# Patient Record
Sex: MALE | Race: WHITE | Employment: FULL TIME
[De-identification: names, ages, dates, MRNs, and addresses within clinical notes are randomized per-mention and may not be internally consistent; named-entity substitution may affect disease eponyms.]

---

## 2017-03-06 RX ORDER — SIMVASTATIN 40 MG
40 TABLET ORAL NIGHTLY
Qty: 90 TABLET | Refills: 0 | Status: SHIPPED | OUTPATIENT
Start: 2017-03-06 | End: 2017-04-24 | Stop reason: SDUPTHER

## 2017-03-06 RX ORDER — SPIRONOLACTONE 50 MG/1
50 TABLET, FILM COATED ORAL 2 TIMES DAILY
Qty: 60 TABLET | Refills: 3 | Status: SHIPPED | OUTPATIENT
Start: 2017-03-06 | End: 2017-04-27 | Stop reason: SDUPTHER

## 2017-03-09 ENCOUNTER — TELEPHONE (OUTPATIENT)
Dept: FAMILY MEDICINE CLINIC | Facility: CLINIC | Age: 47
End: 2017-03-09

## 2017-03-14 ENCOUNTER — HOSPITAL ENCOUNTER (OUTPATIENT)
Dept: CT IMAGING | Age: 47
Discharge: HOME | End: 2017-03-14
Payer: COMMERCIAL

## 2017-03-14 ENCOUNTER — HOSPITAL ENCOUNTER (OUTPATIENT)
Age: 47
Setting detail: SPECIMEN
Discharge: HOME | End: 2017-03-14
Payer: COMMERCIAL

## 2017-03-14 DIAGNOSIS — I71.20 THORACIC AORTIC ANEURYSM WITHOUT RUPTURE: ICD-10-CM

## 2017-03-14 LAB
BUN BLDV-MCNC: 20 MG/DL (ref 6–20)
CREAT SERPL-MCNC: 0.8 MG/DL (ref 0.7–1.2)
GFR AFRICAN AMERICAN: >60 ML/MIN
GFR NON-AFRICAN AMERICAN: >60 ML/MIN
GFR SERPL CREATININE-BSD FRML MDRD: NORMAL ML/MIN/{1.73_M2}
GFR SERPL CREATININE-BSD FRML MDRD: NORMAL ML/MIN/{1.73_M2}

## 2017-03-14 PROCEDURE — 84520 ASSAY OF UREA NITROGEN: CPT

## 2017-03-14 PROCEDURE — 71275 CT ANGIOGRAPHY CHEST: CPT

## 2017-03-14 PROCEDURE — 74175 CTA ABDOMEN W/CONTRAST: CPT

## 2017-03-14 PROCEDURE — 6360000004 HC RX CONTRAST MEDICATION: Performed by: INTERNAL MEDICINE

## 2017-03-14 PROCEDURE — 82565 ASSAY OF CREATININE: CPT

## 2017-03-14 RX ADMIN — IOHEXOL 100 ML: 350 INJECTION, SOLUTION INTRAVENOUS at 16:30

## 2017-03-27 ENCOUNTER — OFFICE VISIT (OUTPATIENT)
Dept: FAMILY MEDICINE CLINIC | Age: 47
End: 2017-03-27
Payer: COMMERCIAL

## 2017-03-27 ENCOUNTER — TELEPHONE (OUTPATIENT)
Dept: FAMILY MEDICINE CLINIC | Age: 47
End: 2017-03-27

## 2017-03-27 ENCOUNTER — HOSPITAL ENCOUNTER (OUTPATIENT)
Age: 47
Discharge: HOME | End: 2017-03-27
Payer: COMMERCIAL

## 2017-03-27 ENCOUNTER — HOSPITAL ENCOUNTER (OUTPATIENT)
Age: 47
Setting detail: SPECIMEN
Discharge: HOME | End: 2017-03-27
Payer: COMMERCIAL

## 2017-03-27 VITALS
BODY MASS INDEX: 49.44 KG/M2 | HEART RATE: 80 BPM | DIASTOLIC BLOOD PRESSURE: 84 MMHG | WEIGHT: 315 LBS | HEIGHT: 67 IN | SYSTOLIC BLOOD PRESSURE: 134 MMHG

## 2017-03-27 DIAGNOSIS — Z11.4 SCREENING FOR HIV (HUMAN IMMUNODEFICIENCY VIRUS): ICD-10-CM

## 2017-03-27 DIAGNOSIS — E11.8 TYPE 2 DIABETES MELLITUS WITH COMPLICATION, WITHOUT LONG-TERM CURRENT USE OF INSULIN (HCC): Primary | ICD-10-CM

## 2017-03-27 DIAGNOSIS — E78.2 MIXED HYPERLIPIDEMIA: ICD-10-CM

## 2017-03-27 DIAGNOSIS — R79.89 ELEVATED LFTS: ICD-10-CM

## 2017-03-27 DIAGNOSIS — E11.8 TYPE 2 DIABETES MELLITUS WITH COMPLICATION, WITHOUT LONG-TERM CURRENT USE OF INSULIN (HCC): ICD-10-CM

## 2017-03-27 DIAGNOSIS — Z23 PNEUMOCOCCAL VACCINATION ADMINISTERED AT CURRENT VISIT: ICD-10-CM

## 2017-03-27 DIAGNOSIS — I10 ESSENTIAL HYPERTENSION: ICD-10-CM

## 2017-03-27 LAB
ABSOLUTE EOS #: 0.12 K/UL (ref 0–0.4)
ABSOLUTE LYMPH #: 3.13 K/UL (ref 1–4.8)
ABSOLUTE MONO #: 0.81 K/UL (ref 0.1–1.3)
ALT SERPL-CCNC: 47 U/L (ref 5–41)
ANION GAP SERPL CALCULATED.3IONS-SCNC: 17 MMOL/L (ref 9–17)
AST SERPL-CCNC: 46 U/L
BASOPHILS # BLD: 0 % (ref 0–2)
BASOPHILS ABSOLUTE: 0 K/UL (ref 0–0.2)
BILIRUBIN, POC: NEGATIVE
BLOOD URINE, POC: NORMAL
BUN BLDV-MCNC: 15 MG/DL (ref 6–20)
BUN/CREAT BLD: ABNORMAL (ref 9–20)
CALCIUM SERPL-MCNC: 9.6 MG/DL (ref 8.6–10.4)
CHLORIDE BLD-SCNC: 93 MMOL/L (ref 98–107)
CHOLESTEROL/HDL RATIO: 4.3
CHOLESTEROL: 160 MG/DL
CLARITY, POC: NORMAL
CO2: 23 MMOL/L (ref 20–31)
COLOR, POC: NORMAL
CREAT SERPL-MCNC: 0.79 MG/DL (ref 0.7–1.2)
DIFFERENTIAL TYPE: ABNORMAL
EOSINOPHILS RELATIVE PERCENT: 1 % (ref 0–4)
GFR AFRICAN AMERICAN: >60 ML/MIN
GFR NON-AFRICAN AMERICAN: >60 ML/MIN
GFR SERPL CREATININE-BSD FRML MDRD: ABNORMAL ML/MIN/{1.73_M2}
GFR SERPL CREATININE-BSD FRML MDRD: ABNORMAL ML/MIN/{1.73_M2}
GLUCOSE BLD-MCNC: 320 MG/DL (ref 70–99)
GLUCOSE BLD-MCNC: 356 MG/DL
GLUCOSE URINE, POC: NORMAL
HCT VFR BLD CALC: 36.9 % (ref 41–53)
HDLC SERPL-MCNC: 37 MG/DL
HEMOGLOBIN: 11.9 G/DL (ref 13.5–17.5)
HIV AG/AB: NONREACTIVE
KETONES, POC: NORMAL
LDL CHOLESTEROL: 83 MG/DL (ref 0–130)
LEUKOCYTE EST, POC: NORMAL
LYMPHOCYTES # BLD: 27 % (ref 24–44)
MCH RBC QN AUTO: 25.7 PG (ref 26–34)
MCHC RBC AUTO-ENTMCNC: 32.2 G/DL (ref 31–37)
MCV RBC AUTO: 79.9 FL (ref 80–100)
MONOCYTES # BLD: 7 % (ref 1–7)
MORPHOLOGY: ABNORMAL
NITRITE, POC: NEGATIVE
PDW BLD-RTO: 14.3 % (ref 11.5–14.9)
PH, POC: 6
PLATELET # BLD: 286 K/UL (ref 150–450)
PLATELET ESTIMATE: ABNORMAL
PMV BLD AUTO: 8.5 FL (ref 6–12)
POTASSIUM SERPL-SCNC: 3.7 MMOL/L (ref 3.7–5.3)
PROTEIN, POC: NORMAL
RBC # BLD: 4.62 M/UL (ref 4.5–5.9)
RBC # BLD: ABNORMAL 10*6/UL
SEG NEUTROPHILS: 65 % (ref 36–66)
SEGMENTED NEUTROPHILS ABSOLUTE COUNT: 7.54 K/UL (ref 1.3–9.1)
SODIUM BLD-SCNC: 133 MMOL/L (ref 135–144)
SPECIFIC GRAVITY, POC: 1.02
TRIGL SERPL-MCNC: 199 MG/DL
UROBILINOGEN, POC: 0.2
VLDLC SERPL CALC-MCNC: ABNORMAL MG/DL (ref 1–30)
WBC # BLD: 11.6 K/UL (ref 3.5–11)
WBC # BLD: ABNORMAL 10*3/UL

## 2017-03-27 PROCEDURE — 90471 IMMUNIZATION ADMIN: CPT | Performed by: NURSE PRACTITIONER

## 2017-03-27 PROCEDURE — 81003 URINALYSIS AUTO W/O SCOPE: CPT | Performed by: NURSE PRACTITIONER

## 2017-03-27 PROCEDURE — 84450 TRANSFERASE (AST) (SGOT): CPT

## 2017-03-27 PROCEDURE — 87389 HIV-1 AG W/HIV-1&-2 AB AG IA: CPT

## 2017-03-27 PROCEDURE — 84460 ALANINE AMINO (ALT) (SGPT): CPT

## 2017-03-27 PROCEDURE — G8427 DOCREV CUR MEDS BY ELIG CLIN: HCPCS | Performed by: NURSE PRACTITIONER

## 2017-03-27 PROCEDURE — 36415 COLL VENOUS BLD VENIPUNCTURE: CPT

## 2017-03-27 PROCEDURE — 1036F TOBACCO NON-USER: CPT | Performed by: NURSE PRACTITIONER

## 2017-03-27 PROCEDURE — 80061 LIPID PANEL: CPT

## 2017-03-27 PROCEDURE — G8484 FLU IMMUNIZE NO ADMIN: HCPCS | Performed by: NURSE PRACTITIONER

## 2017-03-27 PROCEDURE — 83036 HEMOGLOBIN GLYCOSYLATED A1C: CPT

## 2017-03-27 PROCEDURE — G8417 CALC BMI ABV UP PARAM F/U: HCPCS | Performed by: NURSE PRACTITIONER

## 2017-03-27 PROCEDURE — 90732 PPSV23 VACC 2 YRS+ SUBQ/IM: CPT | Performed by: NURSE PRACTITIONER

## 2017-03-27 PROCEDURE — 82962 GLUCOSE BLOOD TEST: CPT | Performed by: NURSE PRACTITIONER

## 2017-03-27 PROCEDURE — 85025 COMPLETE CBC W/AUTO DIFF WBC: CPT

## 2017-03-27 PROCEDURE — 99215 OFFICE O/P EST HI 40 MIN: CPT | Performed by: NURSE PRACTITIONER

## 2017-03-27 PROCEDURE — 3046F HEMOGLOBIN A1C LEVEL >9.0%: CPT | Performed by: NURSE PRACTITIONER

## 2017-03-27 PROCEDURE — 80048 BASIC METABOLIC PNL TOTAL CA: CPT

## 2017-03-27 RX ORDER — PIOGLITAZONEHYDROCHLORIDE 15 MG/1
15 TABLET ORAL DAILY
Qty: 30 TABLET | Refills: 2 | Status: SHIPPED | OUTPATIENT
Start: 2017-03-27 | End: 2017-06-28 | Stop reason: SDUPTHER

## 2017-03-27 ASSESSMENT — PATIENT HEALTH QUESTIONNAIRE - PHQ9
1. LITTLE INTEREST OR PLEASURE IN DOING THINGS: 0
SUM OF ALL RESPONSES TO PHQ9 QUESTIONS 1 & 2: 0
2. FEELING DOWN, DEPRESSED OR HOPELESS: 0
SUM OF ALL RESPONSES TO PHQ QUESTIONS 1-9: 0

## 2017-03-28 ENCOUNTER — TELEPHONE (OUTPATIENT)
Dept: FAMILY MEDICINE CLINIC | Age: 47
End: 2017-03-28

## 2017-03-28 LAB
-: NORMAL
CULTURE: ABNORMAL
CULTURE: ABNORMAL
ESTIMATED AVERAGE GLUCOSE: 321 MG/DL
HBA1C MFR BLD: 12.8 % (ref 4–6)
Lab: ABNORMAL
REASON FOR REJECTION: NORMAL
SPECIMEN DESCRIPTION: ABNORMAL
STATUS: ABNORMAL
ZZ NTE CLEAN UP: ORDERED TEST: NORMAL
ZZ NTE WITH NAME CLEAN UP: SPECIMEN SOURCE: NORMAL

## 2017-03-28 ASSESSMENT — ENCOUNTER SYMPTOMS
COUGH: 0
NAUSEA: 0
SHORTNESS OF BREATH: 0
ABDOMINAL PAIN: 0

## 2017-03-29 RX ORDER — AMOXICILLIN AND CLAVULANATE POTASSIUM 875; 125 MG/1; MG/1
1 TABLET, FILM COATED ORAL EVERY 12 HOURS
Qty: 20 TABLET | Refills: 0 | Status: SHIPPED | OUTPATIENT
Start: 2017-03-29 | End: 2017-04-08

## 2017-03-31 ENCOUNTER — APPOINTMENT (OUTPATIENT)
Dept: CT IMAGING | Age: 47
End: 2017-03-31
Payer: COMMERCIAL

## 2017-03-31 ENCOUNTER — HOSPITAL ENCOUNTER (OUTPATIENT)
Dept: ULTRASOUND IMAGING | Age: 47
Discharge: HOME | End: 2017-03-31
Payer: COMMERCIAL

## 2017-03-31 ENCOUNTER — APPOINTMENT (OUTPATIENT)
Dept: GENERAL RADIOLOGY | Age: 47
End: 2017-03-31
Payer: COMMERCIAL

## 2017-03-31 ENCOUNTER — HOSPITAL ENCOUNTER (EMERGENCY)
Age: 47
Discharge: HOME | End: 2017-03-31
Attending: EMERGENCY MEDICINE
Payer: COMMERCIAL

## 2017-03-31 VITALS
HEART RATE: 82 BPM | HEIGHT: 67 IN | SYSTOLIC BLOOD PRESSURE: 110 MMHG | RESPIRATION RATE: 18 BRPM | BODY MASS INDEX: 49.44 KG/M2 | WEIGHT: 315 LBS | DIASTOLIC BLOOD PRESSURE: 74 MMHG | TEMPERATURE: 98.6 F | OXYGEN SATURATION: 97 %

## 2017-03-31 DIAGNOSIS — R79.89 ELEVATED LFTS: ICD-10-CM

## 2017-03-31 DIAGNOSIS — R73.9 HYPERGLYCEMIA: Primary | ICD-10-CM

## 2017-03-31 LAB
-: ABNORMAL
ABSOLUTE EOS #: 0.1 K/UL (ref 0–0.4)
ABSOLUTE LYMPH #: 2.6 K/UL (ref 1–4.8)
ABSOLUTE MONO #: 0.9 K/UL (ref 0.1–1.3)
AMORPHOUS: ABNORMAL
ANION GAP SERPL CALCULATED.3IONS-SCNC: 18 MMOL/L (ref 9–17)
BACTERIA: ABNORMAL
BASOPHILS # BLD: 0 % (ref 0–2)
BASOPHILS ABSOLUTE: 0 K/UL (ref 0–0.2)
BETA-HYDROXYBUTYRATE: 0.19 MMOL/L (ref 0.02–0.27)
BILIRUBIN URINE: NEGATIVE
BUN BLDV-MCNC: 18 MG/DL (ref 6–20)
BUN/CREAT BLD: ABNORMAL (ref 9–20)
CALCIUM SERPL-MCNC: 9.1 MG/DL (ref 8.6–10.4)
CASTS UA: ABNORMAL /LPF
CHLORIDE BLD-SCNC: 91 MMOL/L (ref 98–107)
CO2: 23 MMOL/L (ref 20–31)
COLOR: YELLOW
COMMENT UA: ABNORMAL
CREAT SERPL-MCNC: 1.02 MG/DL (ref 0.7–1.2)
CRYSTALS, UA: ABNORMAL /HPF
DIFFERENTIAL TYPE: ABNORMAL
EOSINOPHILS RELATIVE PERCENT: 1 % (ref 0–4)
EPITHELIAL CELLS UA: ABNORMAL /HPF
GFR AFRICAN AMERICAN: >60 ML/MIN
GFR NON-AFRICAN AMERICAN: >60 ML/MIN
GFR SERPL CREATININE-BSD FRML MDRD: ABNORMAL ML/MIN/{1.73_M2}
GFR SERPL CREATININE-BSD FRML MDRD: ABNORMAL ML/MIN/{1.73_M2}
GLUCOSE BLD-MCNC: 287 MG/DL (ref 75–110)
GLUCOSE BLD-MCNC: 456 MG/DL (ref 75–110)
GLUCOSE BLD-MCNC: 481 MG/DL (ref 70–99)
GLUCOSE URINE: ABNORMAL
HCT VFR BLD CALC: 37.5 % (ref 41–53)
HEMOGLOBIN: 12.2 G/DL (ref 13.5–17.5)
KETONES, URINE: NEGATIVE
LEUKOCYTE ESTERASE, URINE: NEGATIVE
LYMPHOCYTES # BLD: 22 % (ref 24–44)
MCH RBC QN AUTO: 26.1 PG (ref 26–34)
MCHC RBC AUTO-ENTMCNC: 32.5 G/DL (ref 31–37)
MCV RBC AUTO: 80.3 FL (ref 80–100)
MONOCYTES # BLD: 8 % (ref 1–7)
MUCUS: ABNORMAL
NITRITE, URINE: NEGATIVE
OTHER OBSERVATIONS UA: ABNORMAL
PDW BLD-RTO: 14.6 % (ref 11.5–14.9)
PH UA: 5.5 (ref 5–8)
PLATELET # BLD: 306 K/UL (ref 150–450)
PLATELET ESTIMATE: ABNORMAL
PMV BLD AUTO: 8.8 FL (ref 6–12)
POTASSIUM SERPL-SCNC: 3.9 MMOL/L (ref 3.7–5.3)
PROTEIN UA: ABNORMAL
RBC # BLD: 4.67 M/UL (ref 4.5–5.9)
RBC # BLD: ABNORMAL 10*6/UL
RBC UA: ABNORMAL /HPF
RENAL EPITHELIAL, UA: ABNORMAL /HPF
SEG NEUTROPHILS: 69 % (ref 36–66)
SEGMENTED NEUTROPHILS ABSOLUTE COUNT: 8.1 K/UL (ref 1.3–9.1)
SODIUM BLD-SCNC: 132 MMOL/L (ref 135–144)
SPECIFIC GRAVITY UA: 1.04 (ref 1–1.03)
TRICHOMONAS: ABNORMAL
TURBIDITY: CLEAR
URINE HGB: NEGATIVE
UROBILINOGEN, URINE: NORMAL
WBC # BLD: 11.8 K/UL (ref 3.5–11)
WBC # BLD: ABNORMAL 10*3/UL
WBC UA: ABNORMAL /HPF
YEAST: ABNORMAL

## 2017-03-31 PROCEDURE — 99285 EMERGENCY DEPT VISIT HI MDM: CPT

## 2017-03-31 PROCEDURE — 6360000002 HC RX W HCPCS: Performed by: EMERGENCY MEDICINE

## 2017-03-31 PROCEDURE — 85025 COMPLETE CBC W/AUTO DIFF WBC: CPT

## 2017-03-31 PROCEDURE — 86403 PARTICLE AGGLUT ANTBDY SCRN: CPT

## 2017-03-31 PROCEDURE — 82010 KETONE BODYS QUAN: CPT

## 2017-03-31 PROCEDURE — 82947 ASSAY GLUCOSE BLOOD QUANT: CPT

## 2017-03-31 PROCEDURE — 2580000003 HC RX 258: Performed by: EMERGENCY MEDICINE

## 2017-03-31 PROCEDURE — 87086 URINE CULTURE/COLONY COUNT: CPT

## 2017-03-31 PROCEDURE — 6360000004 HC RX CONTRAST MEDICATION: Performed by: EMERGENCY MEDICINE

## 2017-03-31 PROCEDURE — 96375 TX/PRO/DX INJ NEW DRUG ADDON: CPT

## 2017-03-31 PROCEDURE — 81001 URINALYSIS AUTO W/SCOPE: CPT

## 2017-03-31 PROCEDURE — 36415 COLL VENOUS BLD VENIPUNCTURE: CPT

## 2017-03-31 PROCEDURE — 76705 ECHO EXAM OF ABDOMEN: CPT

## 2017-03-31 PROCEDURE — 74177 CT ABD & PELVIS W/CONTRAST: CPT

## 2017-03-31 PROCEDURE — 80048 BASIC METABOLIC PNL TOTAL CA: CPT

## 2017-03-31 PROCEDURE — 71020 XR CHEST STANDARD TWO VW: CPT

## 2017-03-31 PROCEDURE — 96374 THER/PROPH/DIAG INJ IV PUSH: CPT

## 2017-03-31 RX ORDER — GLIMEPIRIDE 2 MG/1
2 TABLET ORAL
Qty: 10 TABLET | Refills: 0 | Status: SHIPPED | OUTPATIENT
Start: 2017-03-31 | End: 2018-03-22 | Stop reason: ALTCHOICE

## 2017-03-31 RX ORDER — 0.9 % SODIUM CHLORIDE 0.9 %
100 INTRAVENOUS SOLUTION INTRAVENOUS ONCE
Status: COMPLETED | OUTPATIENT
Start: 2017-03-31 | End: 2017-03-31

## 2017-03-31 RX ORDER — ONDANSETRON 2 MG/ML
4 INJECTION INTRAMUSCULAR; INTRAVENOUS ONCE
Status: COMPLETED | OUTPATIENT
Start: 2017-03-31 | End: 2017-03-31

## 2017-03-31 RX ORDER — 0.9 % SODIUM CHLORIDE 0.9 %
1000 INTRAVENOUS SOLUTION INTRAVENOUS ONCE
Status: COMPLETED | OUTPATIENT
Start: 2017-03-31 | End: 2017-03-31

## 2017-03-31 RX ORDER — SODIUM CHLORIDE 0.9 % (FLUSH) 0.9 %
10 SYRINGE (ML) INJECTION PRN
Status: DISCONTINUED | OUTPATIENT
Start: 2017-03-31 | End: 2017-03-31 | Stop reason: HOSPADM

## 2017-03-31 RX ADMIN — Medication 10 ML: at 13:47

## 2017-03-31 RX ADMIN — ONDANSETRON 4 MG: 2 INJECTION INTRAMUSCULAR; INTRAVENOUS at 15:26

## 2017-03-31 RX ADMIN — SODIUM CHLORIDE 1000 ML: 9 INJECTION, SOLUTION INTRAVENOUS at 13:15

## 2017-03-31 RX ADMIN — IOVERSOL 130 ML: 741 INJECTION INTRA-ARTERIAL; INTRAVENOUS at 13:46

## 2017-03-31 RX ADMIN — SODIUM CHLORIDE 100 ML: 9 INJECTION, SOLUTION INTRAVENOUS at 13:46

## 2017-03-31 ASSESSMENT — ENCOUNTER SYMPTOMS
ABDOMINAL PAIN: 1
DIARRHEA: 0
SHORTNESS OF BREATH: 0
EYE DISCHARGE: 0
BLOOD IN STOOL: 0
NAUSEA: 1
APNEA: 0
COUGH: 0
VOMITING: 0
EYE PAIN: 0

## 2017-03-31 ASSESSMENT — PAIN DESCRIPTION - LOCATION: LOCATION: ABDOMEN

## 2017-03-31 ASSESSMENT — PAIN DESCRIPTION - FREQUENCY: FREQUENCY: CONTINUOUS

## 2017-03-31 ASSESSMENT — PAIN DESCRIPTION - PAIN TYPE: TYPE: ACUTE PAIN

## 2017-03-31 ASSESSMENT — PAIN DESCRIPTION - DESCRIPTORS: DESCRIPTORS: ACHING

## 2017-03-31 ASSESSMENT — PAIN SCALES - GENERAL: PAINLEVEL_OUTOF10: 6

## 2017-04-01 LAB
CULTURE: ABNORMAL
CULTURE: ABNORMAL
Lab: ABNORMAL
SPECIMEN DESCRIPTION: ABNORMAL
SPECIMEN DESCRIPTION: ABNORMAL
STATUS: ABNORMAL

## 2017-04-21 ENCOUNTER — HOSPITAL ENCOUNTER (OUTPATIENT)
Age: 47
Discharge: HOME | End: 2017-04-21
Payer: COMMERCIAL

## 2017-04-21 ENCOUNTER — HOSPITAL ENCOUNTER (OUTPATIENT)
Dept: GENERAL RADIOLOGY | Age: 47
Discharge: HOME | End: 2017-04-21
Payer: COMMERCIAL

## 2017-04-21 DIAGNOSIS — N20.0 KIDNEY STONE: ICD-10-CM

## 2017-04-21 LAB
CREATININE URINE: 91.3 MG/DL (ref 39–259)
MICROALBUMIN/CREAT 24H UR: 60 MG/L
MICROALBUMIN/CREAT UR-RTO: 66 MCG/MG CREAT

## 2017-04-21 PROCEDURE — 82043 UR ALBUMIN QUANTITATIVE: CPT

## 2017-04-21 PROCEDURE — 82570 ASSAY OF URINE CREATININE: CPT

## 2017-04-21 PROCEDURE — 74000 XR ABDOMEN LIMITED (KUB): CPT

## 2017-04-24 RX ORDER — SIMVASTATIN 40 MG
TABLET ORAL
Qty: 90 TABLET | Refills: 0 | Status: SHIPPED | OUTPATIENT
Start: 2017-04-24 | End: 2017-05-26 | Stop reason: SDUPTHER

## 2017-05-02 ENCOUNTER — OFFICE VISIT (OUTPATIENT)
Dept: UROLOGY | Age: 47
End: 2017-05-02
Payer: COMMERCIAL

## 2017-05-02 VITALS
TEMPERATURE: 98.2 F | BODY MASS INDEX: 49.44 KG/M2 | SYSTOLIC BLOOD PRESSURE: 120 MMHG | HEIGHT: 67 IN | DIASTOLIC BLOOD PRESSURE: 60 MMHG | HEART RATE: 80 BPM | WEIGHT: 315 LBS

## 2017-05-02 DIAGNOSIS — N20.0 KIDNEY STONES: Primary | ICD-10-CM

## 2017-05-02 DIAGNOSIS — N30.00 ACUTE CYSTITIS WITHOUT HEMATURIA: ICD-10-CM

## 2017-05-02 PROCEDURE — 99214 OFFICE O/P EST MOD 30 MIN: CPT | Performed by: UROLOGY

## 2017-05-02 PROCEDURE — G8417 CALC BMI ABV UP PARAM F/U: HCPCS | Performed by: UROLOGY

## 2017-05-02 PROCEDURE — 1036F TOBACCO NON-USER: CPT | Performed by: UROLOGY

## 2017-05-02 PROCEDURE — G8427 DOCREV CUR MEDS BY ELIG CLIN: HCPCS | Performed by: UROLOGY

## 2017-05-02 RX ORDER — LANCETS
EACH MISCELLANEOUS
Refills: 0 | COMMUNITY
Start: 2017-04-17

## 2017-05-02 RX ORDER — DULAGLUTIDE 0.75 MG/.5ML
INJECTION, SOLUTION SUBCUTANEOUS WEEKLY
Refills: 0 | COMMUNITY
Start: 2017-04-18 | End: 2018-03-22 | Stop reason: SDUPTHER

## 2017-05-02 RX ORDER — FEXOFENADINE HYDROCHLORIDE AND PSEUDOEPHEDRINE HYDROCHLORIDE 180; 240 MG/1; MG/1
TABLET, FILM COATED, EXTENDED RELEASE ORAL
Refills: 0 | COMMUNITY
Start: 2017-02-07 | End: 2019-01-23

## 2017-05-02 RX ORDER — LISINOPRIL 2.5 MG/1
1 TABLET ORAL DAILY
Refills: 0 | COMMUNITY
Start: 2017-04-24 | End: 2018-03-22 | Stop reason: SDUPTHER

## 2017-05-02 ASSESSMENT — ENCOUNTER SYMPTOMS
SHORTNESS OF BREATH: 0
ABDOMINAL PAIN: 0
VOMITING: 0
BACK PAIN: 0
EYE REDNESS: 0
COLOR CHANGE: 0
EYE PAIN: 0
COUGH: 0
NAUSEA: 0
WHEEZING: 0

## 2017-05-26 RX ORDER — SITAGLIPTIN AND METFORMIN HYDROCHLORIDE 1000; 50 MG/1; MG/1
TABLET, FILM COATED, EXTENDED RELEASE ORAL
Qty: 180 TABLET | Refills: 0 | Status: SHIPPED | OUTPATIENT
Start: 2017-05-26 | End: 2017-08-18 | Stop reason: SDUPTHER

## 2017-05-26 RX ORDER — SIMVASTATIN 40 MG
TABLET ORAL
Qty: 90 TABLET | Refills: 0 | Status: SHIPPED | OUTPATIENT
Start: 2017-05-26 | End: 2017-08-18 | Stop reason: SDUPTHER

## 2017-08-21 RX ORDER — SPIRONOLACTONE 50 MG/1
TABLET, FILM COATED ORAL
Qty: 180 TABLET | Refills: 0 | Status: SHIPPED | OUTPATIENT
Start: 2017-08-21 | End: 2017-11-16 | Stop reason: SDUPTHER

## 2017-08-21 RX ORDER — SIMVASTATIN 40 MG
TABLET ORAL
Qty: 90 TABLET | Refills: 0 | Status: SHIPPED | OUTPATIENT
Start: 2017-08-21 | End: 2018-02-08 | Stop reason: SDUPTHER

## 2017-08-21 RX ORDER — SITAGLIPTIN AND METFORMIN HYDROCHLORIDE 1000; 50 MG/1; MG/1
TABLET, FILM COATED, EXTENDED RELEASE ORAL
Qty: 180 TABLET | Refills: 0 | Status: SHIPPED | OUTPATIENT
Start: 2017-08-21 | End: 2017-11-16 | Stop reason: SDUPTHER

## 2017-11-05 DIAGNOSIS — E11.8 TYPE 2 DIABETES MELLITUS WITH COMPLICATION, WITHOUT LONG-TERM CURRENT USE OF INSULIN (HCC): ICD-10-CM

## 2017-11-06 RX ORDER — PIOGLITAZONEHYDROCHLORIDE 15 MG/1
TABLET ORAL
Qty: 30 TABLET | Refills: 3 | Status: SHIPPED | OUTPATIENT
Start: 2017-11-06 | End: 2018-03-11 | Stop reason: SDUPTHER

## 2017-11-06 NOTE — TELEPHONE ENCOUNTER
PLEASE APPROVE OR REFUSE. Next Visit Date: No future appointments. Health Maintenance   Topic Date Due    DTaP/Tdap/Td vaccine (1 - Tdap) 01/02/2006    Diabetic hemoglobin A1C test  06/27/2017    Flu vaccine (1) 12/31/2017 (Originally 9/1/2017)    Diabetic foot exam  11/21/2017    Lipid screen  03/27/2018    Diabetic retinal exam  04/03/2018    Diabetic microalbuminuria test  04/21/2018    Pneumococcal med risk  Completed    HIV screen  Completed       Hemoglobin A1C (%)   Date Value   03/27/2017 12.8 (H)   07/15/2015 6.1 (H)   06/03/2014 5.8             ( goal A1C is < 7)   Microalb/Crt.  Ratio (mcg/mg creat)   Date Value   04/21/2017 66 (H)     LDL Cholesterol (mg/dL)   Date Value   03/27/2017 83       (goal LDL is <100)   AST (U/L)   Date Value   03/27/2017 46 (H)     ALT (U/L)   Date Value   03/27/2017 47 (H)     BUN (mg/dL)   Date Value   03/31/2017 18     BP Readings from Last 3 Encounters:   05/02/17 120/60   03/31/17 110/74   03/27/17 134/84          (goal 120/80)    All Future Testing planned in [facility]  Lab Frequency Next Occurrence   Microalbumin, Ur Once 03/31/2017         Patient Active Problem List:     Hyperlipidemia     MVP (mitral valve prolapse)     Sleep apnea     Diabetes mellitus (HCC)     GERD (gastroesophageal reflux disease)     Screening PSA (prostate specific antigen)     Asthma     RUQ abdominal pain     Nonalcoholic hepatosteatosis     Constipation     Diabetes mellitus, type 2 (HCC)     Gastroparesis     Diverticulitis     Dysuria     Nausea     Abdominal bloating     DM (diabetes mellitus) ([address]     HLD (hyperlipidemia)     Mixed hyperlipidemia     Essential hypertension

## 2017-12-13 ENCOUNTER — TELEPHONE (OUTPATIENT)
Dept: OTHER | Facility: CLINIC | Age: 47
End: 2017-12-13

## 2017-12-13 NOTE — TELEPHONE ENCOUNTER
Attempted to contact patient regarding need for A1C. Left message for patient to return call      Patient is also due for an appt.

## 2018-02-08 RX ORDER — SIMVASTATIN 40 MG
TABLET ORAL
Qty: 90 TABLET | Refills: 0 | Status: SHIPPED | OUTPATIENT
Start: 2018-02-08 | End: 2018-10-15 | Stop reason: SDUPTHER

## 2018-02-08 RX ORDER — SPIRONOLACTONE 50 MG/1
TABLET, FILM COATED ORAL
Qty: 180 TABLET | Refills: 0 | Status: SHIPPED | OUTPATIENT
Start: 2018-02-08 | End: 2018-10-15 | Stop reason: SDUPTHER

## 2018-03-11 DIAGNOSIS — E11.8 TYPE 2 DIABETES MELLITUS WITH COMPLICATION, WITHOUT LONG-TERM CURRENT USE OF INSULIN (HCC): ICD-10-CM

## 2018-03-12 ENCOUNTER — HOSPITAL ENCOUNTER (OUTPATIENT)
Age: 48
Setting detail: SPECIMEN
Discharge: HOME | End: 2018-03-12
Payer: COMMERCIAL

## 2018-03-12 LAB
ALT SERPL-CCNC: 37 U/L (ref 5–41)
ANION GAP SERPL CALCULATED.3IONS-SCNC: 19 MMOL/L (ref 9–17)
AST SERPL-CCNC: 40 U/L
BUN BLDV-MCNC: 17 MG/DL (ref 6–20)
BUN/CREAT BLD: ABNORMAL (ref 9–20)
CALCIUM SERPL-MCNC: 9.4 MG/DL (ref 8.6–10.4)
CHLORIDE BLD-SCNC: 99 MMOL/L (ref 98–107)
CHOLESTEROL/HDL RATIO: 3.3
CHOLESTEROL: 140 MG/DL
CO2: 21 MMOL/L (ref 20–31)
CREAT SERPL-MCNC: 0.76 MG/DL (ref 0.7–1.2)
ESTIMATED AVERAGE GLUCOSE: 212 MG/DL
GFR AFRICAN AMERICAN: >60 ML/MIN
GFR NON-AFRICAN AMERICAN: >60 ML/MIN
GFR SERPL CREATININE-BSD FRML MDRD: ABNORMAL ML/MIN/{1.73_M2}
GFR SERPL CREATININE-BSD FRML MDRD: ABNORMAL ML/MIN/{1.73_M2}
GLUCOSE BLD-MCNC: 156 MG/DL (ref 70–99)
HBA1C MFR BLD: 9 % (ref 4–6)
HDLC SERPL-MCNC: 43 MG/DL
LDL CHOLESTEROL: 60 MG/DL (ref 0–130)
POTASSIUM SERPL-SCNC: 4.9 MMOL/L (ref 3.7–5.3)
SODIUM BLD-SCNC: 139 MMOL/L (ref 135–144)
TRIGL SERPL-MCNC: 187 MG/DL
VLDLC SERPL CALC-MCNC: ABNORMAL MG/DL (ref 1–30)

## 2018-03-12 RX ORDER — PIOGLITAZONEHYDROCHLORIDE 15 MG/1
TABLET ORAL
Qty: 30 TABLET | Refills: 3 | Status: SHIPPED | OUTPATIENT
Start: 2018-03-12 | End: 2018-04-11 | Stop reason: SDUPTHER

## 2018-03-12 NOTE — TELEPHONE ENCOUNTER
Please Approve or Refuse. Next Visit Date:  Visit date not found    Hemoglobin A1C (%)   Date Value   03/27/2017 12.8 (H)   07/15/2015 6.1 (H)   06/03/2014 5.8             ( goal A1C is < 7)   Microalb/Crt.  Ratio (mcg/mg creat)   Date Value   04/21/2017 66 (H)     LDL Cholesterol (mg/dL)   Date Value   03/27/2017 83       (goal LDL is <100)   AST (U/L)   Date Value   03/27/2017 46 (H)     ALT (U/L)   Date Value   03/27/2017 47 (H)     BUN (mg/dL)   Date Value   03/31/2017 18     BP Readings from Last 3 Encounters:   05/02/17 120/60   03/31/17 110/74   03/27/17 134/84          (goal 120/80)        Patient Active Problem List:     Hyperlipidemia     MVP (mitral valve prolapse)     Sleep apnea     Diabetes mellitus (HCC)     GERD (gastroesophageal reflux disease)     Screening PSA (prostate specific antigen)     Asthma     RUQ abdominal pain     Nonalcoholic hepatosteatosis     Constipation     Diabetes mellitus, type 2 (HCC)     Gastroparesis     Diverticulitis     Dysuria     Nausea     Abdominal bloating     DM (diabetes mellitus) ([address]     HLD (hyperlipidemia)     Mixed hyperlipidemia     Essential hypertension

## 2018-03-22 ENCOUNTER — OFFICE VISIT (OUTPATIENT)
Dept: FAMILY MEDICINE CLINIC | Age: 48
End: 2018-03-22
Payer: COMMERCIAL

## 2018-03-22 VITALS
DIASTOLIC BLOOD PRESSURE: 68 MMHG | HEART RATE: 88 BPM | RESPIRATION RATE: 16 BRPM | HEIGHT: 66 IN | TEMPERATURE: 97.3 F | OXYGEN SATURATION: 97 % | WEIGHT: 315 LBS | SYSTOLIC BLOOD PRESSURE: 112 MMHG | BODY MASS INDEX: 50.62 KG/M2

## 2018-03-22 DIAGNOSIS — G47.33 OBSTRUCTIVE SLEEP APNEA SYNDROME: ICD-10-CM

## 2018-03-22 DIAGNOSIS — K21.9 GASTROESOPHAGEAL REFLUX DISEASE, ESOPHAGITIS PRESENCE NOT SPECIFIED: ICD-10-CM

## 2018-03-22 DIAGNOSIS — K76.0 NONALCOHOLIC HEPATOSTEATOSIS: ICD-10-CM

## 2018-03-22 DIAGNOSIS — J45.20 MILD INTERMITTENT ASTHMA WITHOUT COMPLICATION: ICD-10-CM

## 2018-03-22 DIAGNOSIS — J01.90 ACUTE BACTERIAL SINUSITIS: ICD-10-CM

## 2018-03-22 DIAGNOSIS — B96.89 ACUTE BACTERIAL SINUSITIS: ICD-10-CM

## 2018-03-22 DIAGNOSIS — I10 ESSENTIAL HYPERTENSION: ICD-10-CM

## 2018-03-22 DIAGNOSIS — E66.01 MORBID OBESITY WITH BMI OF 70 AND OVER, ADULT (HCC): ICD-10-CM

## 2018-03-22 DIAGNOSIS — I87.2 CHRONIC VENOUS INSUFFICIENCY: ICD-10-CM

## 2018-03-22 DIAGNOSIS — E78.2 MIXED HYPERLIPIDEMIA: ICD-10-CM

## 2018-03-22 PROCEDURE — G8428 CUR MEDS NOT DOCUMENT: HCPCS | Performed by: INTERNAL MEDICINE

## 2018-03-22 PROCEDURE — 1036F TOBACCO NON-USER: CPT | Performed by: INTERNAL MEDICINE

## 2018-03-22 PROCEDURE — 3045F PR MOST RECENT HEMOGLOBIN A1C LEVEL 7.0-9.0%: CPT | Performed by: INTERNAL MEDICINE

## 2018-03-22 PROCEDURE — G8417 CALC BMI ABV UP PARAM F/U: HCPCS | Performed by: INTERNAL MEDICINE

## 2018-03-22 PROCEDURE — G8484 FLU IMMUNIZE NO ADMIN: HCPCS | Performed by: INTERNAL MEDICINE

## 2018-03-22 PROCEDURE — 99214 OFFICE O/P EST MOD 30 MIN: CPT | Performed by: INTERNAL MEDICINE

## 2018-03-22 RX ORDER — CHLORTHALIDONE 25 MG/1
25 TABLET ORAL DAILY
Qty: 90 TABLET | Refills: 1 | Status: SHIPPED | OUTPATIENT
Start: 2018-03-22 | End: 2018-08-12 | Stop reason: SDUPTHER

## 2018-03-22 RX ORDER — DULAGLUTIDE 0.75 MG/.5ML
0.75 INJECTION, SOLUTION SUBCUTANEOUS WEEKLY
Qty: 4 PEN | Refills: 3 | Status: SHIPPED | OUTPATIENT
Start: 2018-03-22 | End: 2018-06-28 | Stop reason: SDUPTHER

## 2018-03-22 RX ORDER — MEDICAL SUPPLY, MISCELLANEOUS
EACH MISCELLANEOUS
Qty: 2 EACH | Refills: 0 | Status: SHIPPED | OUTPATIENT
Start: 2018-03-22

## 2018-03-22 RX ORDER — CHLORTHALIDONE 25 MG/1
25 TABLET ORAL DAILY
COMMUNITY
End: 2018-03-22 | Stop reason: SDUPTHER

## 2018-03-22 RX ORDER — CEPHALEXIN 500 MG/1
500 CAPSULE ORAL 3 TIMES DAILY
Qty: 21 CAPSULE | Refills: 0 | Status: SHIPPED | OUTPATIENT
Start: 2018-03-22 | End: 2018-03-29

## 2018-03-22 RX ORDER — LISINOPRIL 2.5 MG/1
2.5 TABLET ORAL DAILY
Qty: 90 TABLET | Refills: 1 | Status: SHIPPED | OUTPATIENT
Start: 2018-03-22 | End: 2018-08-12 | Stop reason: SDUPTHER

## 2018-03-22 RX ORDER — BIOTIN 5 MG
1 TABLET ORAL DAILY
COMMUNITY

## 2018-03-22 NOTE — PROGRESS NOTES
without constipation or diarrhea         Objective:        Physical Exam:  /68 (Site: Right Arm, Position: Sitting, Cuff Size: Large Adult)   Pulse 88   Temp 97.3 °F (36.3 °C) (Oral)   Resp 16   Ht 5' 5.5\" (1.664 m)   Wt (!) 429 lb (194.6 kg)   SpO2 97%   BMI 70.30 kg/m²     General: Alert and oriented, in no distress. Patient ambulating with normal gait. Central obesity. ENT: purulent rhinorrhea and post-nasal drip, no oropharyngeal erythema, bilateral maxillary sinus tenderness to palpation noted  Chest: clear with no wheezes or rales. No retractions, or use of accessory muscles noted. Cardiovascular: PMI is not displaced, and no thrill noted. Regular rate and rhythm with no rub, murmur or gallop. There is no peripheral edema. Pedal pulses are normal.   Abdomen: Abdomen is soft and nontender. The bowel sounds are normal.   Musculoskeletal: There are no deformities of the the extremities. Patient has all ten fingers intact. The patient has full range of motion on all 4 extremities without pain. Skin: The skin is warm and dry. There are no rashes noted. Prior to Visit Medications    Medication Sig Taking?  Authorizing Provider   chlorthalidone (HYGROTON) 25 MG tablet Take 25 mg by mouth daily Yes Historical Provider, MD   CINNAMON PO Take 1,000 mg by mouth daily Yes Historical Provider, MD   Misc Natural Products (GLUCOSAMINE CHONDROITIN ADV) TABS Take by mouth Yes Historical Provider, MD   pioglitazone (ACTOS) 15 MG tablet take 1 tablet by mouth once daily Yes Anthony Lazo CNP   SITagliptin-metFORMIN (JANUMET XR)  MG TB24 per extended release tablet Take 1 tablet by mouth 2 times daily Yes Anthony Lazo CNP   spironolactone (ALDACTONE) 50 MG tablet TAKE 1 TABLET BY MOUTH TWO  TIMES DAILY Yes Anthony Lazo CNP   simvastatin (ZOCOR) 40 MG tablet Take 1 tablet by mouth  nightly Yes Anthony Lazo CNP   TRULICITY 1.59 GH/3.5HM SOPN Inject into the skin once a week Yes Historical Provider, MD   Blood 12:56 PM  9:37 AM   Cholesterol      <200 mg/dL 140 160 149   HDL Cholesterol      >40 mg/dL 43 37 (L) 42   LDL Cholesterol      0 - 130 mg/dL 60 83 68   Chol/HDL Ratio      <5 3.3 4.3 3.5   Triglycerides      <150 mg/dL 187 (H) 199 (H) 194 (H)   VLDL      1 - 30 mg/dL NOT REPORTED NOT REPORTED NOT REPORTED        Assessment/Plan:      1. Uncontrolled type 2 diabetes mellitus with diabetic polyneuropathy, without long-term current use of insulin (HCC)  Continue Janumet XR 50-1000mg PO BID, actos 66MM pO QD   - TRULICITY 5.30 ZC/7.5YJ SOPN; Inject 0.75 mg into the skin once a week  Dispense: 4 pen; Refill: 3 - consider increasing to 1.5mg weekly NV if sugars not improving   - glucose log provided   - diet recommendations reviewed     2. Obstructive sleep apnea syndrome  - compliance with CPAP reviewed, reviewed relation of sleep apnea to DM    3. Mixed hyperlipidemia  Continue zocor 40mg PO QHS and aspirin 81mg PO QD     4. Gastroesophageal reflux disease, esophagitis presence not specified  Continue prilosec 20mg PO QD     5. Nonalcoholic hepatosteatosis  Strict diabetes and cholesterol control for now  Monitoring labs in six months     6. Essential hypertension  - lisinopril (PRINIVIL;ZESTRIL) 2.5 MG tablet; Take 1 tablet by mouth daily  Dispense: 90 tablet; Refill: 1  - chlorthalidone (HYGROTON) 25 MG tablet; Take 1 tablet by mouth daily  Dispense: 90 tablet; Refill: 1    7. Acute bacterial sinusitis  - cephALEXin (KEFLEX) 500 MG capsule; Take 1 capsule by mouth 3 times daily for 7 days  Dispense: 21 capsule; Refill: 0    8. Mild intermittent asthma without complication  PRN albuterol     9. Chronic venous insufficiency  - Elastic Bandages & Supports (V-2 HIGH COMPRESSION HOSE) MISC; 30-40mmHg pressure stockings, knee high. Wear 12 hours while awake and remove at bedtime  Dispense: 2 each; Refill: 0    10.  Morbid obesity with BMI of 70 and over, adult ([facility] [address]  Continue diet and lifestyle management  Ongoing dietary counseling re: low salt diet, five fruit/vegetables daily, healthy fats  Avoid processed and sugary foods, large amounts of alcoholic drinks   Will start exercise regimen with his wife as weather warms up  Declines referral to weight management for now, though it is the one intervention that will provide him the most benefit and help manage most of his health issues - will continue to review diet and lifestyle closely   May be a good candidate for combination medication like [place] as well, though insurance coverage may be an issue         Electronically signed by Sharif Cabrera MD on 3/22/2018 at 4:11 PM

## 2018-04-11 DIAGNOSIS — E11.8 TYPE 2 DIABETES MELLITUS WITH COMPLICATION, WITHOUT LONG-TERM CURRENT USE OF INSULIN (HCC): ICD-10-CM

## 2018-04-12 RX ORDER — PIOGLITAZONEHYDROCHLORIDE 15 MG/1
TABLET ORAL
Qty: 90 TABLET | Refills: 1 | Status: SHIPPED | OUTPATIENT
Start: 2018-04-12 | End: 2018-09-02 | Stop reason: SDUPTHER

## 2018-04-16 ENCOUNTER — OFFICE VISIT (OUTPATIENT)
Dept: FAMILY MEDICINE CLINIC | Age: 48
End: 2018-04-16
Payer: COMMERCIAL

## 2018-04-16 VITALS
OXYGEN SATURATION: 100 % | WEIGHT: 315 LBS | TEMPERATURE: 98.1 F | BODY MASS INDEX: 67.52 KG/M2 | SYSTOLIC BLOOD PRESSURE: 120 MMHG | DIASTOLIC BLOOD PRESSURE: 80 MMHG | HEART RATE: 77 BPM

## 2018-04-16 DIAGNOSIS — B96.89 ACUTE BACTERIAL SINUSITIS: Primary | ICD-10-CM

## 2018-04-16 DIAGNOSIS — H92.03 OTALGIA OF BOTH EARS: ICD-10-CM

## 2018-04-16 DIAGNOSIS — R09.81 SINUS CONGESTION: ICD-10-CM

## 2018-04-16 DIAGNOSIS — J01.90 ACUTE BACTERIAL SINUSITIS: Primary | ICD-10-CM

## 2018-04-16 DIAGNOSIS — J30.2 CHRONIC SEASONAL ALLERGIC RHINITIS, UNSPECIFIED TRIGGER: ICD-10-CM

## 2018-04-16 PROCEDURE — 1036F TOBACCO NON-USER: CPT | Performed by: NURSE PRACTITIONER

## 2018-04-16 PROCEDURE — G8427 DOCREV CUR MEDS BY ELIG CLIN: HCPCS | Performed by: NURSE PRACTITIONER

## 2018-04-16 PROCEDURE — 99213 OFFICE O/P EST LOW 20 MIN: CPT | Performed by: NURSE PRACTITIONER

## 2018-04-16 PROCEDURE — G8417 CALC BMI ABV UP PARAM F/U: HCPCS | Performed by: NURSE PRACTITIONER

## 2018-04-16 RX ORDER — MONTELUKAST SODIUM 10 MG/1
10 TABLET ORAL DAILY
Qty: 30 TABLET | Refills: 5 | Status: SHIPPED | OUTPATIENT
Start: 2018-04-16 | End: 2019-01-23

## 2018-04-16 RX ORDER — AMOXICILLIN AND CLAVULANATE POTASSIUM 875; 125 MG/1; MG/1
1 TABLET, FILM COATED ORAL EVERY 12 HOURS
Qty: 20 TABLET | Refills: 0 | Status: SHIPPED | OUTPATIENT
Start: 2018-04-16 | End: 2018-04-26

## 2018-04-16 ASSESSMENT — ENCOUNTER SYMPTOMS
COUGH: 1
RHINORRHEA: 1
SINUS PRESSURE: 1
SORE THROAT: 0

## 2018-04-16 ASSESSMENT — PATIENT HEALTH QUESTIONNAIRE - PHQ9
1. LITTLE INTEREST OR PLEASURE IN DOING THINGS: 0
SUM OF ALL RESPONSES TO PHQ9 QUESTIONS 1 & 2: 0
SUM OF ALL RESPONSES TO PHQ QUESTIONS 1-9: 0
2. FEELING DOWN, DEPRESSED OR HOPELESS: 0

## 2018-04-19 ENCOUNTER — OFFICE VISIT (OUTPATIENT)
Dept: FAMILY MEDICINE CLINIC | Age: 48
End: 2018-04-19
Payer: COMMERCIAL

## 2018-04-19 VITALS
TEMPERATURE: 97.9 F | SYSTOLIC BLOOD PRESSURE: 110 MMHG | BODY MASS INDEX: 68.24 KG/M2 | HEART RATE: 72 BPM | OXYGEN SATURATION: 96 % | DIASTOLIC BLOOD PRESSURE: 68 MMHG | WEIGHT: 315 LBS | RESPIRATION RATE: 16 BRPM

## 2018-04-19 DIAGNOSIS — K76.0 NONALCOHOLIC HEPATOSTEATOSIS: ICD-10-CM

## 2018-04-19 DIAGNOSIS — M25.562 CHRONIC PAIN OF LEFT KNEE: ICD-10-CM

## 2018-04-19 DIAGNOSIS — E66.01 MORBID OBESITY WITH BMI OF 60.0-69.9, ADULT (HCC): Primary | ICD-10-CM

## 2018-04-19 DIAGNOSIS — G89.29 CHRONIC PAIN OF LEFT KNEE: ICD-10-CM

## 2018-04-19 DIAGNOSIS — G47.33 OBSTRUCTIVE SLEEP APNEA SYNDROME: ICD-10-CM

## 2018-04-19 DIAGNOSIS — E78.2 MIXED HYPERLIPIDEMIA: ICD-10-CM

## 2018-04-19 DIAGNOSIS — E11.9 TYPE 2 DIABETES MELLITUS WITHOUT COMPLICATION, WITHOUT LONG-TERM CURRENT USE OF INSULIN (HCC): ICD-10-CM

## 2018-04-19 DIAGNOSIS — K21.9 GASTROESOPHAGEAL REFLUX DISEASE, ESOPHAGITIS PRESENCE NOT SPECIFIED: ICD-10-CM

## 2018-04-19 PROCEDURE — G8417 CALC BMI ABV UP PARAM F/U: HCPCS | Performed by: INTERNAL MEDICINE

## 2018-04-19 PROCEDURE — 1036F TOBACCO NON-USER: CPT | Performed by: INTERNAL MEDICINE

## 2018-04-19 PROCEDURE — G8427 DOCREV CUR MEDS BY ELIG CLIN: HCPCS | Performed by: INTERNAL MEDICINE

## 2018-04-19 PROCEDURE — 2022F DILAT RTA XM EVC RTNOPTHY: CPT | Performed by: INTERNAL MEDICINE

## 2018-04-19 PROCEDURE — 99214 OFFICE O/P EST MOD 30 MIN: CPT | Performed by: INTERNAL MEDICINE

## 2018-04-19 PROCEDURE — 3045F PR MOST RECENT HEMOGLOBIN A1C LEVEL 7.0-9.0%: CPT | Performed by: INTERNAL MEDICINE

## 2018-04-19 RX ORDER — IBUPROFEN 800 MG/1
800 TABLET ORAL EVERY 8 HOURS PRN
Qty: 90 TABLET | Refills: 3 | Status: SHIPPED | OUTPATIENT
Start: 2018-04-19 | End: 2018-10-05 | Stop reason: SDUPTHER

## 2018-05-24 RX ORDER — ALBUTEROL SULFATE 90 UG/1
2 AEROSOL, METERED RESPIRATORY (INHALATION) EVERY 6 HOURS PRN
Qty: 1 INHALER | Refills: 5 | Status: SHIPPED | OUTPATIENT
Start: 2018-05-24

## 2018-06-28 RX ORDER — DULAGLUTIDE 0.75 MG/.5ML
0.75 INJECTION, SOLUTION SUBCUTANEOUS WEEKLY
Qty: 4 PEN | Refills: 3 | Status: SHIPPED | OUTPATIENT
Start: 2018-06-28 | End: 2018-08-21 | Stop reason: SDUPTHER

## 2018-08-12 DIAGNOSIS — I10 ESSENTIAL HYPERTENSION: ICD-10-CM

## 2018-08-16 RX ORDER — CHLORTHALIDONE 25 MG/1
25 TABLET ORAL DAILY
Qty: 30 TABLET | Refills: 0 | Status: SHIPPED | OUTPATIENT
Start: 2018-08-16 | End: 2018-10-13 | Stop reason: SDUPTHER

## 2018-08-16 RX ORDER — LISINOPRIL 2.5 MG/1
2.5 TABLET ORAL DAILY
Qty: 30 TABLET | Refills: 0 | Status: SHIPPED | OUTPATIENT
Start: 2018-08-16 | End: 2018-10-13 | Stop reason: SDUPTHER

## 2018-08-22 RX ORDER — DULAGLUTIDE 0.75 MG/.5ML
INJECTION, SOLUTION SUBCUTANEOUS
Qty: 6 ML | Refills: 3 | Status: SHIPPED | OUTPATIENT
Start: 2018-08-22 | End: 2018-10-23 | Stop reason: SDUPTHER

## 2018-09-02 DIAGNOSIS — E11.8 TYPE 2 DIABETES MELLITUS WITH COMPLICATION, WITHOUT LONG-TERM CURRENT USE OF INSULIN (HCC): ICD-10-CM

## 2018-09-05 RX ORDER — PIOGLITAZONEHYDROCHLORIDE 15 MG/1
TABLET ORAL
Qty: 90 TABLET | Refills: 1 | Status: SHIPPED | OUTPATIENT
Start: 2018-09-05 | End: 2019-02-10 | Stop reason: SDUPTHER

## 2018-10-04 DIAGNOSIS — M25.562 CHRONIC PAIN OF LEFT KNEE: ICD-10-CM

## 2018-10-04 DIAGNOSIS — G89.29 CHRONIC PAIN OF LEFT KNEE: ICD-10-CM

## 2018-10-04 RX ORDER — IBUPROFEN 800 MG/1
TABLET ORAL
Qty: 90 TABLET | Refills: 3 | OUTPATIENT
Start: 2018-10-04

## 2018-10-05 DIAGNOSIS — M25.562 CHRONIC PAIN OF LEFT KNEE: ICD-10-CM

## 2018-10-05 DIAGNOSIS — G89.29 CHRONIC PAIN OF LEFT KNEE: ICD-10-CM

## 2018-10-05 RX ORDER — IBUPROFEN 800 MG/1
800 TABLET ORAL EVERY 8 HOURS PRN
Qty: 30 TABLET | Refills: 0 | Status: SHIPPED | OUTPATIENT
Start: 2018-10-05 | End: 2018-10-23 | Stop reason: SDUPTHER

## 2018-10-08 ENCOUNTER — OFFICE VISIT (OUTPATIENT)
Dept: FAMILY MEDICINE CLINIC | Age: 48
End: 2018-10-08
Payer: COMMERCIAL

## 2018-10-08 VITALS
RESPIRATION RATE: 20 BRPM | TEMPERATURE: 97.9 F | DIASTOLIC BLOOD PRESSURE: 72 MMHG | HEART RATE: 74 BPM | BODY MASS INDEX: 66.21 KG/M2 | SYSTOLIC BLOOD PRESSURE: 112 MMHG | WEIGHT: 315 LBS | OXYGEN SATURATION: 97 %

## 2018-10-08 DIAGNOSIS — H93.8X3 EAR PRESSURE, BILATERAL: ICD-10-CM

## 2018-10-08 DIAGNOSIS — J01.00 ACUTE NON-RECURRENT MAXILLARY SINUSITIS: Primary | ICD-10-CM

## 2018-10-08 PROCEDURE — 99213 OFFICE O/P EST LOW 20 MIN: CPT | Performed by: NURSE PRACTITIONER

## 2018-10-08 PROCEDURE — G8417 CALC BMI ABV UP PARAM F/U: HCPCS | Performed by: NURSE PRACTITIONER

## 2018-10-08 PROCEDURE — G8427 DOCREV CUR MEDS BY ELIG CLIN: HCPCS | Performed by: NURSE PRACTITIONER

## 2018-10-08 PROCEDURE — 1036F TOBACCO NON-USER: CPT | Performed by: NURSE PRACTITIONER

## 2018-10-08 PROCEDURE — G8484 FLU IMMUNIZE NO ADMIN: HCPCS | Performed by: NURSE PRACTITIONER

## 2018-10-08 RX ORDER — ZOLPIDEM TARTRATE 10 MG/1
10 TABLET ORAL PRN
Refills: 0 | COMMUNITY
Start: 2018-10-01 | End: 2021-10-02

## 2018-10-08 RX ORDER — BUPROPION HYDROCHLORIDE 150 MG/1
150 TABLET ORAL DAILY
Refills: 0 | COMMUNITY
Start: 2018-10-01 | End: 2018-11-13 | Stop reason: DRUGHIGH

## 2018-10-08 RX ORDER — AMOXICILLIN AND CLAVULANATE POTASSIUM 875; 125 MG/1; MG/1
1 TABLET, FILM COATED ORAL 2 TIMES DAILY
Qty: 20 TABLET | Refills: 0 | Status: SHIPPED | OUTPATIENT
Start: 2018-10-08 | End: 2018-10-18

## 2018-10-08 ASSESSMENT — ENCOUNTER SYMPTOMS
SHORTNESS OF BREATH: 0
SWOLLEN GLANDS: 0
SINUS PRESSURE: 1
SORE THROAT: 0
HOARSE VOICE: 0

## 2018-10-08 NOTE — PROGRESS NOTES
[address] [address]  Dept: [phone]  Dept Fax: [phone]    Maverick Fry is a 53 y.o. male who presents to the urgent care today for his medical conditions/complaints as noted below. Maverick Fry is c/o of Nasal Congestion (started sinus issues friday night and got worse yesterday); Head Congestion; Cough (productive ); Ear Fullness; and Facial Pain      HPI:     73-year-old male patient presents with complaints of sinus pressure and congestion. Patient states he's had a few sinus issues and then in the last few days just suddenly much worse. She has pressure behind his ears feels like it's hard to hear. He has a history of allergic rhinitis he has been taking his antihistamine. States he took one dose of Sudafed and 1 dose of NyQuil last night with some relief. He has a history of asthma but denies any shortness of breath or wheezing, has not had using his inhaler. He has not had a fevers but did say he had some chills last night. No nausea or vomiting no chest pain. Patient is a type II diabetic patient. Has not checked his sugar in the last 2 days. Patient does not take insulin. Sinusitis   This is a new problem. The current episode started in the past 7 days. The problem has been gradually worsening since onset. There has been no fever. His pain is at a severity of 3/10. The pain is mild. Associated symptoms include chills, congestion, ear pain and sinus pressure. Pertinent negatives include no diaphoresis, hoarse voice, shortness of breath, sore throat or swollen glands.        Past Medical History:   Diagnosis Date    Asthma     Diabetes     borderline    GERD (gastroesophageal reflux disease)     Hyperlipidemia     Liver disease     MVP (mitral valve prolapse)     Screening PSA (prostate specific antigen) 07/13/2009    Unspecified sleep apnea         Current Outpatient Prescriptions

## 2018-10-13 DIAGNOSIS — I10 ESSENTIAL HYPERTENSION: ICD-10-CM

## 2018-10-16 RX ORDER — SPIRONOLACTONE 50 MG/1
TABLET, FILM COATED ORAL
Qty: 180 TABLET | Refills: 1 | Status: SHIPPED | OUTPATIENT
Start: 2018-10-16 | End: 2019-04-23 | Stop reason: SDUPTHER

## 2018-10-16 RX ORDER — LISINOPRIL 2.5 MG/1
2.5 TABLET ORAL DAILY
Qty: 90 TABLET | Refills: 1 | Status: SHIPPED | OUTPATIENT
Start: 2018-10-16 | End: 2019-04-23 | Stop reason: SDUPTHER

## 2018-10-16 RX ORDER — CHLORTHALIDONE 25 MG/1
25 TABLET ORAL DAILY
Qty: 90 TABLET | Refills: 1 | Status: SHIPPED | OUTPATIENT
Start: 2018-10-16 | End: 2019-04-23 | Stop reason: SDUPTHER

## 2018-10-16 RX ORDER — SIMVASTATIN 40 MG
TABLET ORAL
Qty: 90 TABLET | Refills: 1 | Status: SHIPPED | OUTPATIENT
Start: 2018-10-16 | End: 2019-04-23 | Stop reason: SDUPTHER

## 2018-10-23 ENCOUNTER — OFFICE VISIT (OUTPATIENT)
Dept: FAMILY MEDICINE CLINIC | Age: 48
End: 2018-10-23
Payer: COMMERCIAL

## 2018-10-23 VITALS
BODY MASS INDEX: 67.75 KG/M2 | HEART RATE: 78 BPM | RESPIRATION RATE: 16 BRPM | OXYGEN SATURATION: 97 % | DIASTOLIC BLOOD PRESSURE: 58 MMHG | WEIGHT: 315 LBS | SYSTOLIC BLOOD PRESSURE: 118 MMHG | TEMPERATURE: 97.4 F

## 2018-10-23 DIAGNOSIS — E78.2 MIXED HYPERLIPIDEMIA: ICD-10-CM

## 2018-10-23 DIAGNOSIS — M25.562 CHRONIC PAIN OF LEFT KNEE: ICD-10-CM

## 2018-10-23 DIAGNOSIS — I34.1 MVP (MITRAL VALVE PROLAPSE): ICD-10-CM

## 2018-10-23 DIAGNOSIS — K76.0 NONALCOHOLIC HEPATOSTEATOSIS: ICD-10-CM

## 2018-10-23 DIAGNOSIS — J01.90 ACUTE BACTERIAL SINUSITIS: ICD-10-CM

## 2018-10-23 DIAGNOSIS — Z23 NEED FOR INFLUENZA VACCINATION: ICD-10-CM

## 2018-10-23 DIAGNOSIS — B96.89 ACUTE BACTERIAL SINUSITIS: ICD-10-CM

## 2018-10-23 DIAGNOSIS — G89.29 CHRONIC PAIN OF LEFT KNEE: ICD-10-CM

## 2018-10-23 DIAGNOSIS — K21.9 GASTROESOPHAGEAL REFLUX DISEASE, ESOPHAGITIS PRESENCE NOT SPECIFIED: ICD-10-CM

## 2018-10-23 DIAGNOSIS — I10 ESSENTIAL HYPERTENSION: ICD-10-CM

## 2018-10-23 LAB — HBA1C MFR BLD: 7 %

## 2018-10-23 PROCEDURE — G8417 CALC BMI ABV UP PARAM F/U: HCPCS | Performed by: INTERNAL MEDICINE

## 2018-10-23 PROCEDURE — 82044 UR ALBUMIN SEMIQUANTITATIVE: CPT | Performed by: INTERNAL MEDICINE

## 2018-10-23 PROCEDURE — 99214 OFFICE O/P EST MOD 30 MIN: CPT | Performed by: INTERNAL MEDICINE

## 2018-10-23 PROCEDURE — 83036 HEMOGLOBIN GLYCOSYLATED A1C: CPT | Performed by: INTERNAL MEDICINE

## 2018-10-23 PROCEDURE — 2022F DILAT RTA XM EVC RTNOPTHY: CPT | Performed by: INTERNAL MEDICINE

## 2018-10-23 PROCEDURE — G8427 DOCREV CUR MEDS BY ELIG CLIN: HCPCS | Performed by: INTERNAL MEDICINE

## 2018-10-23 PROCEDURE — 3045F PR MOST RECENT HEMOGLOBIN A1C LEVEL 7.0-9.0%: CPT | Performed by: INTERNAL MEDICINE

## 2018-10-23 PROCEDURE — G8482 FLU IMMUNIZE ORDER/ADMIN: HCPCS | Performed by: INTERNAL MEDICINE

## 2018-10-23 PROCEDURE — 1036F TOBACCO NON-USER: CPT | Performed by: INTERNAL MEDICINE

## 2018-10-23 RX ORDER — IBUPROFEN 800 MG/1
800 TABLET ORAL EVERY 8 HOURS PRN
Qty: 30 TABLET | Refills: 0 | Status: SHIPPED | OUTPATIENT
Start: 2018-10-23 | End: 2018-11-19

## 2018-10-23 RX ORDER — DOXYCYCLINE HYCLATE 100 MG
100 TABLET ORAL 2 TIMES DAILY
Qty: 20 TABLET | Refills: 0 | Status: ON HOLD | OUTPATIENT
Start: 2018-10-23 | End: 2018-11-01 | Stop reason: HOSPADM

## 2018-10-23 RX ORDER — DULAGLUTIDE 0.75 MG/.5ML
1.5 INJECTION, SOLUTION SUBCUTANEOUS
Qty: 12 ML | Refills: 1 | Status: SHIPPED | OUTPATIENT
Start: 2018-10-23 | End: 2018-11-05

## 2018-10-23 NOTE — PROGRESS NOTES
Cigarettes     Quit date: 7/9/2015    Smokeless tobacco: Never Used    Alcohol use 0.0 oz/week      Comment: 1-2/month        Review of Systems  Energy level good overall, and weight is stable. No chest pain or shortness of breath. Bowels have been normal without constipation or diarrhea         Objective:        Physical Exam:  BP (!) 118/58 (Site: Right Upper Arm, Position: Sitting, Cuff Size: Large Adult)   Pulse 78   Temp 97.4 °F (36.3 °C) (Oral)   Resp 16   Wt (!) 413 lb 6.4 oz (187.5 kg)   SpO2 97%   BMI 67.75 kg/m²   Wt Readings from Last 3 Encounters:   10/23/18 (!) 413 lb 6.4 oz (187.5 kg)   10/08/18 (!) 404 lb (183.3 kg)   04/19/18 (!) 416 lb 6.4 oz (188.9 kg)        General: Alert and oriented, in no distress. Patient ambulating with normal gait. Central obesity. Chest: clear with no wheezes or rales. No retractions, or use of accessory muscles noted. Cardiovascular: PMI is not displaced, and no thrill noted. Regular rate and rhythm with no rub, murmur or gallop. There is no peripheral edema. Pedal pulses are normal.   Abdomen: Abdomen is soft and nontender. The bowel sounds are normal.   Musculoskeletal: There are no deformities of the the extremities. Patient has all ten fingers intact. The patient has full range of motion on all 4 extremities without pain. Skin: The skin is warm and dry. There are no rashes noted. Prior to Visit Medications    Medication Sig Taking?  Authorizing Provider   chlorthalidone (HYGROTON) 25 MG tablet TAKE 1 TABLET BY MOUTH  DAILY Yes Tamara Parrish MD   lisinopril (PRINIVIL;ZESTRIL) 2.5 MG tablet TAKE 1 TABLET BY MOUTH  DAILY Yes Tamara Parrish MD   spironolactone (ALDACTONE) 50 MG tablet TAKE 1 TABLET BY MOUTH TWO  TIMES DAILY Yes Tamara Parrish MD   SITagliptin-metFORMIN (JANUMET XR)  MG TB24 per extended release tablet Take 1 tablet by mouth 2 times daily Yes Tamara Parrish MD   simvastatin (ZOCOR) 40 MG tablet Take 1 tablet

## 2018-10-23 NOTE — PROGRESS NOTES
03/12/2019    Diabetic retinal exam  05/15/2019    Pneumococcal med risk  Completed    HIV screen  Completed

## 2018-10-24 LAB
CREATININE URINE POCT: 100
MICROALBUMIN/CREAT 24H UR: 80 MG/G{CREAT}
MICROALBUMIN/CREAT UR-RTO: ABNORMAL

## 2018-10-25 PROCEDURE — 90471 IMMUNIZATION ADMIN: CPT | Performed by: INTERNAL MEDICINE

## 2018-10-25 PROCEDURE — 90472 IMMUNIZATION ADMIN EACH ADD: CPT | Performed by: INTERNAL MEDICINE

## 2018-10-25 PROCEDURE — 90688 IIV4 VACCINE SPLT 0.5 ML IM: CPT | Performed by: INTERNAL MEDICINE

## 2018-10-30 ENCOUNTER — HOSPITAL ENCOUNTER (INPATIENT)
Age: 48
LOS: 2 days | Discharge: HOME | DRG: 690 | End: 2018-11-01
Attending: EMERGENCY MEDICINE | Admitting: INTERNAL MEDICINE
Payer: COMMERCIAL

## 2018-10-30 ENCOUNTER — APPOINTMENT (OUTPATIENT)
Dept: CT IMAGING | Age: 48
DRG: 690 | End: 2018-10-30
Payer: COMMERCIAL

## 2018-10-30 DIAGNOSIS — N12 PYELONEPHRITIS: Primary | ICD-10-CM

## 2018-10-30 PROBLEM — N10 ACUTE PYELONEPHRITIS: Status: ACTIVE | Noted: 2018-10-30

## 2018-10-30 LAB
-: ABNORMAL
ABSOLUTE EOS #: 0.2 K/UL (ref 0–0.4)
ABSOLUTE IMMATURE GRANULOCYTE: ABNORMAL K/UL (ref 0–0.3)
ABSOLUTE LYMPH #: 2.8 K/UL (ref 1–4.8)
ABSOLUTE MONO #: 1 K/UL (ref 0.1–1.3)
ALBUMIN SERPL-MCNC: 4 G/DL (ref 3.5–5.2)
ALBUMIN/GLOBULIN RATIO: ABNORMAL (ref 1–2.5)
ALP BLD-CCNC: 78 U/L (ref 40–129)
ALT SERPL-CCNC: 15 U/L (ref 5–41)
AMORPHOUS: ABNORMAL
ANION GAP SERPL CALCULATED.3IONS-SCNC: 15 MMOL/L (ref 9–17)
AST SERPL-CCNC: 20 U/L
BACTERIA: ABNORMAL
BASOPHILS # BLD: 0 % (ref 0–2)
BASOPHILS ABSOLUTE: 0.1 K/UL (ref 0–0.2)
BILIRUB SERPL-MCNC: 0.24 MG/DL (ref 0.3–1.2)
BILIRUBIN URINE: NEGATIVE
BUN BLDV-MCNC: 23 MG/DL (ref 6–20)
BUN/CREAT BLD: ABNORMAL (ref 9–20)
CALCIUM SERPL-MCNC: 9.6 MG/DL (ref 8.6–10.4)
CASTS UA: ABNORMAL /LPF
CHLORIDE BLD-SCNC: 100 MMOL/L (ref 98–107)
CO2: 22 MMOL/L (ref 20–31)
COLOR: YELLOW
COMMENT UA: ABNORMAL
CREAT SERPL-MCNC: 0.9 MG/DL (ref 0.7–1.2)
CRYSTALS, UA: ABNORMAL /HPF
DIFFERENTIAL TYPE: ABNORMAL
EOSINOPHILS RELATIVE PERCENT: 1 % (ref 0–4)
EPITHELIAL CELLS UA: ABNORMAL /HPF
GFR AFRICAN AMERICAN: >60 ML/MIN
GFR NON-AFRICAN AMERICAN: >60 ML/MIN
GFR SERPL CREATININE-BSD FRML MDRD: ABNORMAL ML/MIN/{1.73_M2}
GFR SERPL CREATININE-BSD FRML MDRD: ABNORMAL ML/MIN/{1.73_M2}
GLUCOSE BLD-MCNC: 113 MG/DL (ref 70–99)
GLUCOSE URINE: NEGATIVE
HCT VFR BLD CALC: 27.6 % (ref 41–53)
HEMOGLOBIN: 9 G/DL (ref 13.5–17.5)
IMMATURE GRANULOCYTES: ABNORMAL %
KETONES, URINE: NEGATIVE
LEUKOCYTE ESTERASE, URINE: ABNORMAL
LIPASE: 33 U/L (ref 13–60)
LYMPHOCYTES # BLD: 17 % (ref 24–44)
MCH RBC QN AUTO: 27.9 PG (ref 26–34)
MCHC RBC AUTO-ENTMCNC: 32.6 G/DL (ref 31–37)
MCV RBC AUTO: 85.5 FL (ref 80–100)
MONOCYTES # BLD: 6 % (ref 1–7)
MUCUS: ABNORMAL
NITRITE, URINE: NEGATIVE
NRBC AUTOMATED: ABNORMAL PER 100 WBC
OTHER OBSERVATIONS UA: ABNORMAL
PDW BLD-RTO: 13.4 % (ref 11.5–14.9)
PH UA: 6 (ref 5–8)
PLATELET # BLD: 388 K/UL (ref 150–450)
PLATELET ESTIMATE: ABNORMAL
PMV BLD AUTO: 7.3 FL (ref 6–12)
POTASSIUM SERPL-SCNC: 4 MMOL/L (ref 3.7–5.3)
PROTEIN UA: NEGATIVE
RBC # BLD: 3.23 M/UL (ref 4.5–5.9)
RBC # BLD: ABNORMAL 10*6/UL
RBC UA: ABNORMAL /HPF
RENAL EPITHELIAL, UA: ABNORMAL /HPF
SEG NEUTROPHILS: 76 % (ref 36–66)
SEGMENTED NEUTROPHILS ABSOLUTE COUNT: 12 K/UL (ref 1.3–9.1)
SODIUM BLD-SCNC: 137 MMOL/L (ref 135–144)
SPECIFIC GRAVITY UA: 1.01 (ref 1–1.03)
TOTAL PROTEIN: 8.2 G/DL (ref 6.4–8.3)
TRICHOMONAS: ABNORMAL
TURBIDITY: CLEAR
URINE HGB: NEGATIVE
UROBILINOGEN, URINE: NORMAL
WBC # BLD: 16.1 K/UL (ref 3.5–11)
WBC # BLD: ABNORMAL 10*3/UL
WBC UA: ABNORMAL /HPF
YEAST: ABNORMAL

## 2018-10-30 PROCEDURE — 2580000003 HC RX 258: Performed by: EMERGENCY MEDICINE

## 2018-10-30 PROCEDURE — 85025 COMPLETE CBC W/AUTO DIFF WBC: CPT

## 2018-10-30 PROCEDURE — 96374 THER/PROPH/DIAG INJ IV PUSH: CPT

## 2018-10-30 PROCEDURE — 6360000002 HC RX W HCPCS: Performed by: INTERNAL MEDICINE

## 2018-10-30 PROCEDURE — G0378 HOSPITAL OBSERVATION PER HR: HCPCS

## 2018-10-30 PROCEDURE — 87077 CULTURE AEROBIC IDENTIFY: CPT

## 2018-10-30 PROCEDURE — 6360000002 HC RX W HCPCS: Performed by: EMERGENCY MEDICINE

## 2018-10-30 PROCEDURE — 81001 URINALYSIS AUTO W/SCOPE: CPT

## 2018-10-30 PROCEDURE — 83690 ASSAY OF LIPASE: CPT

## 2018-10-30 PROCEDURE — 1200000000 HC SEMI PRIVATE

## 2018-10-30 PROCEDURE — 80053 COMPREHEN METABOLIC PANEL: CPT

## 2018-10-30 PROCEDURE — 74176 CT ABD & PELVIS W/O CONTRAST: CPT

## 2018-10-30 PROCEDURE — 87086 URINE CULTURE/COLONY COUNT: CPT

## 2018-10-30 PROCEDURE — 96375 TX/PRO/DX INJ NEW DRUG ADDON: CPT

## 2018-10-30 PROCEDURE — 36415 COLL VENOUS BLD VENIPUNCTURE: CPT

## 2018-10-30 PROCEDURE — 2580000003 HC RX 258: Performed by: INTERNAL MEDICINE

## 2018-10-30 PROCEDURE — 99223 1ST HOSP IP/OBS HIGH 75: CPT | Performed by: INTERNAL MEDICINE

## 2018-10-30 PROCEDURE — 87186 SC STD MICRODIL/AGAR DIL: CPT

## 2018-10-30 PROCEDURE — 99285 EMERGENCY DEPT VISIT HI MDM: CPT

## 2018-10-30 PROCEDURE — 6360000002 HC RX W HCPCS: Performed by: NURSE PRACTITIONER

## 2018-10-30 RX ORDER — 0.9 % SODIUM CHLORIDE 0.9 %
1000 INTRAVENOUS SOLUTION INTRAVENOUS ONCE
Status: COMPLETED | OUTPATIENT
Start: 2018-10-30 | End: 2018-10-30

## 2018-10-30 RX ORDER — KETOROLAC TROMETHAMINE 30 MG/ML
30 INJECTION, SOLUTION INTRAMUSCULAR; INTRAVENOUS EVERY 6 HOURS PRN
Status: DISCONTINUED | OUTPATIENT
Start: 2018-10-30 | End: 2018-11-01 | Stop reason: HOSPADM

## 2018-10-30 RX ORDER — SODIUM CHLORIDE 9 MG/ML
INJECTION, SOLUTION INTRAVENOUS CONTINUOUS
Status: DISCONTINUED | OUTPATIENT
Start: 2018-10-30 | End: 2018-11-01 | Stop reason: HOSPADM

## 2018-10-30 RX ORDER — FENTANYL CITRATE 50 UG/ML
50 INJECTION, SOLUTION INTRAMUSCULAR; INTRAVENOUS ONCE
Status: COMPLETED | OUTPATIENT
Start: 2018-10-30 | End: 2018-10-30

## 2018-10-30 RX ORDER — ACETAMINOPHEN 325 MG/1
650 TABLET ORAL EVERY 4 HOURS PRN
Status: DISCONTINUED | OUTPATIENT
Start: 2018-10-30 | End: 2018-11-01 | Stop reason: HOSPADM

## 2018-10-30 RX ORDER — SODIUM CHLORIDE 0.9 % (FLUSH) 0.9 %
10 SYRINGE (ML) INJECTION EVERY 12 HOURS SCHEDULED
Status: DISCONTINUED | OUTPATIENT
Start: 2018-10-30 | End: 2018-11-01 | Stop reason: HOSPADM

## 2018-10-30 RX ORDER — SODIUM CHLORIDE 0.9 % (FLUSH) 0.9 %
10 SYRINGE (ML) INJECTION PRN
Status: DISCONTINUED | OUTPATIENT
Start: 2018-10-30 | End: 2018-11-01 | Stop reason: HOSPADM

## 2018-10-30 RX ORDER — ONDANSETRON 2 MG/ML
4 INJECTION INTRAMUSCULAR; INTRAVENOUS ONCE
Status: COMPLETED | OUTPATIENT
Start: 2018-10-30 | End: 2018-10-30

## 2018-10-30 RX ORDER — KETOROLAC TROMETHAMINE 30 MG/ML
30 INJECTION, SOLUTION INTRAMUSCULAR; INTRAVENOUS ONCE
Status: COMPLETED | OUTPATIENT
Start: 2018-10-30 | End: 2018-10-30

## 2018-10-30 RX ADMIN — FENTANYL CITRATE 50 MCG: 50 INJECTION INTRAMUSCULAR; INTRAVENOUS at 22:39

## 2018-10-30 RX ADMIN — SODIUM CHLORIDE: 9 INJECTION, SOLUTION INTRAVENOUS at 21:59

## 2018-10-30 RX ADMIN — KETOROLAC TROMETHAMINE 30 MG: 30 INJECTION, SOLUTION INTRAMUSCULAR at 18:14

## 2018-10-30 RX ADMIN — ENOXAPARIN SODIUM 40 MG: 40 INJECTION SUBCUTANEOUS at 22:00

## 2018-10-30 RX ADMIN — SODIUM CHLORIDE 1000 ML: 9 INJECTION, SOLUTION INTRAVENOUS at 18:14

## 2018-10-30 RX ADMIN — ONDANSETRON 4 MG: 2 INJECTION INTRAMUSCULAR; INTRAVENOUS at 18:14

## 2018-10-30 RX ADMIN — CEFTRIAXONE SODIUM 1 G: 1 INJECTION, POWDER, FOR SOLUTION INTRAMUSCULAR; INTRAVENOUS at 20:18

## 2018-10-30 ASSESSMENT — ENCOUNTER SYMPTOMS
ABDOMINAL PAIN: 1
RESPIRATORY NEGATIVE: 1
COUGH: 0
SHORTNESS OF BREATH: 0
EYES NEGATIVE: 1
BACK PAIN: 0

## 2018-10-30 ASSESSMENT — PAIN DESCRIPTION - ORIENTATION
ORIENTATION: LEFT
ORIENTATION: LEFT

## 2018-10-30 ASSESSMENT — PAIN DESCRIPTION - LOCATION
LOCATION: FLANK
LOCATION: ABDOMEN;BACK

## 2018-10-30 ASSESSMENT — PAIN SCALES - GENERAL
PAINLEVEL_OUTOF10: 8
PAINLEVEL_OUTOF10: 7
PAINLEVEL_OUTOF10: 7
PAINLEVEL_OUTOF10: 8
PAINLEVEL_OUTOF10: 7

## 2018-10-30 ASSESSMENT — PAIN DESCRIPTION - PAIN TYPE
TYPE: ACUTE PAIN
TYPE: ACUTE PAIN

## 2018-10-30 ASSESSMENT — PAIN DESCRIPTION - DESCRIPTORS: DESCRIPTORS: SHARP;STABBING

## 2018-10-30 NOTE — ED PROVIDER NOTES
abnormals are listed below. Labs Reviewed   CBC WITH AUTO DIFFERENTIAL - Abnormal; Notable for the following:        Result Value    WBC 16.1 (*)     RBC 3.23 (*)     Hemoglobin 9.0 (*)     Hematocrit 27.6 (*)     Seg Neutrophils 76 (*)     Lymphocytes 17 (*)     Segs Absolute 12.00 (*)     All other components within normal limits   COMPREHENSIVE METABOLIC PANEL - Abnormal; Notable for the following:     Glucose 113 (*)     BUN 23 (*)     Total Bilirubin 0.24 (*)     All other components within normal limits   URINE RT REFLEX TO CULTURE - Abnormal; Notable for the following:     Leukocyte Esterase, Urine TRACE (*)     All other components within normal limits   MICROSCOPIC URINALYSIS - Abnormal; Notable for the following:     Bacteria, UA FEW (*)     Mucus, UA 1+ (*)     Amorphous, UA 1+ (*)     All other components within normal limits   URINE CULTURE CLEAN CATCH   LIPASE     EMERGENCY DEPARTMENTCOURSE:   Vitals:    Vitals:    10/30/18 1720 10/30/18 2105 10/30/18 2119   BP: (!) 138/54 (!) 99/45 (!) 100/44   Pulse: 74 72 75   Resp: 16 17 16   Temp: 98.6 °F (37 °C) 98.5 °F (36.9 °C) 97.7 °F (36.5 °C)   TempSrc: Oral Oral Oral   SpO2: 97% 96% 96%   Weight: (!) 409 lb (185.5 kg)     Height: 5' 6\" (1.676 m)         The patient was given the following medications while in the emergency department:  Orders Placed This Encounter   Medications    ketorolac (TORADOL) injection 30 mg    0.9 % sodium chloride bolus    ondansetron (ZOFRAN) injection 4 mg    cefTRIAXone (ROCEPHIN) 1 g IVPB in 50 mL D5W minibag    sodium chloride flush 0.9 % injection 10 mL    sodium chloride flush 0.9 % injection 10 mL    acetaminophen (TYLENOL) tablet 650 mg    DISCONTD: enoxaparin (LOVENOX) injection 40 mg    0.9 % sodium chloride infusion    enoxaparin (LOVENOX) injection 40 mg     CONSULTS:  IP CONSULT TO INTERNAL MEDICINE  IP CONSULT TO RESPIRATORY CARE    FINAL IMPRESSION      1.  Pyelonephritis          DISPOSITION/PLAN

## 2018-10-31 ENCOUNTER — TELEPHONE (OUTPATIENT)
Dept: UROLOGY | Age: 48
End: 2018-10-31

## 2018-10-31 PROBLEM — N20.0 NEPHROLITH: Status: ACTIVE | Noted: 2018-10-31

## 2018-10-31 PROBLEM — N20.0 NEPHROLITH: Status: RESOLVED | Noted: 2018-10-31 | Resolved: 2018-10-31

## 2018-10-31 LAB
ANION GAP SERPL CALCULATED.3IONS-SCNC: 11 MMOL/L (ref 9–17)
BUN BLDV-MCNC: 23 MG/DL (ref 6–20)
BUN/CREAT BLD: ABNORMAL (ref 9–20)
CALCIUM SERPL-MCNC: 9 MG/DL (ref 8.6–10.4)
CHLORIDE BLD-SCNC: 101 MMOL/L (ref 98–107)
CO2: 23 MMOL/L (ref 20–31)
CREAT SERPL-MCNC: 1.04 MG/DL (ref 0.7–1.2)
GFR AFRICAN AMERICAN: >60 ML/MIN
GFR NON-AFRICAN AMERICAN: >60 ML/MIN
GFR SERPL CREATININE-BSD FRML MDRD: ABNORMAL ML/MIN/{1.73_M2}
GFR SERPL CREATININE-BSD FRML MDRD: ABNORMAL ML/MIN/{1.73_M2}
GLUCOSE BLD-MCNC: 101 MG/DL (ref 75–110)
GLUCOSE BLD-MCNC: 114 MG/DL (ref 75–110)
GLUCOSE BLD-MCNC: 122 MG/DL (ref 70–99)
GLUCOSE BLD-MCNC: 123 MG/DL (ref 75–110)
GLUCOSE BLD-MCNC: 123 MG/DL (ref 75–110)
HCT VFR BLD CALC: 24.7 % (ref 41–53)
HEMOGLOBIN: 8 G/DL (ref 13.5–17.5)
IRON SATURATION: 13 % (ref 20–55)
IRON: 31 UG/DL (ref 59–158)
MCH RBC QN AUTO: 28 PG (ref 26–34)
MCHC RBC AUTO-ENTMCNC: 32.5 G/DL (ref 31–37)
MCV RBC AUTO: 86.3 FL (ref 80–100)
NRBC AUTOMATED: ABNORMAL PER 100 WBC
PDW BLD-RTO: 13.1 % (ref 11.5–14.9)
PLATELET # BLD: 306 K/UL (ref 150–450)
PMV BLD AUTO: 7.1 FL (ref 6–12)
POTASSIUM SERPL-SCNC: 4.5 MMOL/L (ref 3.7–5.3)
RBC # BLD: 2.86 M/UL (ref 4.5–5.9)
SODIUM BLD-SCNC: 135 MMOL/L (ref 135–144)
TOTAL IRON BINDING CAPACITY: 247 UG/DL (ref 250–450)
UNSATURATED IRON BINDING CAPACITY: 216 UG/DL (ref 112–347)
WBC # BLD: 12.6 K/UL (ref 3.5–11)

## 2018-10-31 PROCEDURE — 94664 DEMO&/EVAL PT USE INHALER: CPT

## 2018-10-31 PROCEDURE — 2580000003 HC RX 258: Performed by: NURSE PRACTITIONER

## 2018-10-31 PROCEDURE — 80048 BASIC METABOLIC PNL TOTAL CA: CPT

## 2018-10-31 PROCEDURE — 2580000003 HC RX 258: Performed by: INTERNAL MEDICINE

## 2018-10-31 PROCEDURE — 36415 COLL VENOUS BLD VENIPUNCTURE: CPT

## 2018-10-31 PROCEDURE — 6360000002 HC RX W HCPCS: Performed by: INTERNAL MEDICINE

## 2018-10-31 PROCEDURE — 94660 CPAP INITIATION&MGMT: CPT

## 2018-10-31 PROCEDURE — 85027 COMPLETE CBC AUTOMATED: CPT

## 2018-10-31 PROCEDURE — 83540 ASSAY OF IRON: CPT

## 2018-10-31 PROCEDURE — 6370000000 HC RX 637 (ALT 250 FOR IP): Performed by: NURSE PRACTITIONER

## 2018-10-31 PROCEDURE — 83550 IRON BINDING TEST: CPT

## 2018-10-31 PROCEDURE — 82947 ASSAY GLUCOSE BLOOD QUANT: CPT

## 2018-10-31 PROCEDURE — 6360000002 HC RX W HCPCS: Performed by: NURSE PRACTITIONER

## 2018-10-31 PROCEDURE — 1200000000 HC SEMI PRIVATE

## 2018-10-31 RX ORDER — POTASSIUM CHLORIDE 20MEQ/15ML
40 LIQUID (ML) ORAL PRN
Status: DISCONTINUED | OUTPATIENT
Start: 2018-10-31 | End: 2018-11-01 | Stop reason: HOSPADM

## 2018-10-31 RX ORDER — LISINOPRIL 5 MG/1
2.5 TABLET ORAL DAILY
Status: DISCONTINUED | OUTPATIENT
Start: 2018-10-31 | End: 2018-11-01 | Stop reason: HOSPADM

## 2018-10-31 RX ORDER — SPIRONOLACTONE 25 MG/1
50 TABLET ORAL 2 TIMES DAILY
Status: DISCONTINUED | OUTPATIENT
Start: 2018-10-31 | End: 2018-11-01 | Stop reason: HOSPADM

## 2018-10-31 RX ORDER — CHLORTHALIDONE 25 MG/1
25 TABLET ORAL DAILY
Status: DISCONTINUED | OUTPATIENT
Start: 2018-10-31 | End: 2018-11-01 | Stop reason: HOSPADM

## 2018-10-31 RX ORDER — ALBUTEROL SULFATE 90 UG/1
2 AEROSOL, METERED RESPIRATORY (INHALATION) EVERY 6 HOURS PRN
Status: DISCONTINUED | OUTPATIENT
Start: 2018-10-31 | End: 2018-11-01 | Stop reason: HOSPADM

## 2018-10-31 RX ORDER — NITROGLYCERIN 0.4 MG/1
0.4 TABLET SUBLINGUAL EVERY 5 MIN PRN
Status: DISCONTINUED | OUTPATIENT
Start: 2018-10-31 | End: 2018-11-01 | Stop reason: HOSPADM

## 2018-10-31 RX ORDER — DEXTROSE MONOHYDRATE 50 MG/ML
100 INJECTION, SOLUTION INTRAVENOUS PRN
Status: DISCONTINUED | OUTPATIENT
Start: 2018-10-31 | End: 2018-11-01 | Stop reason: HOSPADM

## 2018-10-31 RX ORDER — MONTELUKAST SODIUM 10 MG/1
10 TABLET ORAL DAILY
Status: DISCONTINUED | OUTPATIENT
Start: 2018-10-31 | End: 2018-11-01 | Stop reason: HOSPADM

## 2018-10-31 RX ORDER — PANTOPRAZOLE SODIUM 40 MG/1
40 TABLET, DELAYED RELEASE ORAL
Status: DISCONTINUED | OUTPATIENT
Start: 2018-10-31 | End: 2018-11-01 | Stop reason: HOSPADM

## 2018-10-31 RX ORDER — DEXTROSE MONOHYDRATE 25 G/50ML
12.5 INJECTION, SOLUTION INTRAVENOUS PRN
Status: DISCONTINUED | OUTPATIENT
Start: 2018-10-31 | End: 2018-11-01 | Stop reason: HOSPADM

## 2018-10-31 RX ORDER — MAGNESIUM SULFATE 1 G/100ML
1 INJECTION INTRAVENOUS PRN
Status: DISCONTINUED | OUTPATIENT
Start: 2018-10-31 | End: 2018-11-01 | Stop reason: HOSPADM

## 2018-10-31 RX ORDER — BUPROPION HYDROCHLORIDE 150 MG/1
150 TABLET ORAL DAILY
Status: DISCONTINUED | OUTPATIENT
Start: 2018-10-31 | End: 2018-11-01 | Stop reason: HOSPADM

## 2018-10-31 RX ORDER — FLUTICASONE PROPIONATE 50 MCG
1 SPRAY, SUSPENSION (ML) NASAL 2 TIMES DAILY
Status: DISCONTINUED | OUTPATIENT
Start: 2018-10-31 | End: 2018-11-01 | Stop reason: HOSPADM

## 2018-10-31 RX ORDER — SIMVASTATIN 40 MG
40 TABLET ORAL NIGHTLY
Status: DISCONTINUED | OUTPATIENT
Start: 2018-10-31 | End: 2018-11-01 | Stop reason: HOSPADM

## 2018-10-31 RX ORDER — BISACODYL 10 MG
10 SUPPOSITORY, RECTAL RECTAL DAILY PRN
Status: DISCONTINUED | OUTPATIENT
Start: 2018-10-31 | End: 2018-11-01 | Stop reason: HOSPADM

## 2018-10-31 RX ORDER — ONDANSETRON 2 MG/ML
4 INJECTION INTRAMUSCULAR; INTRAVENOUS EVERY 6 HOURS PRN
Status: DISCONTINUED | OUTPATIENT
Start: 2018-10-31 | End: 2018-11-01 | Stop reason: HOSPADM

## 2018-10-31 RX ORDER — ZOLPIDEM TARTRATE 10 MG/1
10 TABLET ORAL NIGHTLY PRN
Status: DISCONTINUED | OUTPATIENT
Start: 2018-10-31 | End: 2018-11-01 | Stop reason: HOSPADM

## 2018-10-31 RX ORDER — POTASSIUM CHLORIDE 7.45 MG/ML
10 INJECTION INTRAVENOUS PRN
Status: DISCONTINUED | OUTPATIENT
Start: 2018-10-31 | End: 2018-11-01 | Stop reason: HOSPADM

## 2018-10-31 RX ORDER — POTASSIUM CHLORIDE 20 MEQ/1
40 TABLET, EXTENDED RELEASE ORAL PRN
Status: DISCONTINUED | OUTPATIENT
Start: 2018-10-31 | End: 2018-11-01 | Stop reason: HOSPADM

## 2018-10-31 RX ORDER — ASPIRIN 81 MG/1
81 TABLET ORAL DAILY
Status: DISCONTINUED | OUTPATIENT
Start: 2018-10-31 | End: 2018-11-01 | Stop reason: HOSPADM

## 2018-10-31 RX ORDER — NICOTINE POLACRILEX 4 MG
15 LOZENGE BUCCAL PRN
Status: DISCONTINUED | OUTPATIENT
Start: 2018-10-31 | End: 2018-11-01 | Stop reason: HOSPADM

## 2018-10-31 RX ADMIN — CHLORTHALIDONE 25 MG: 25 TABLET ORAL at 08:54

## 2018-10-31 RX ADMIN — PANTOPRAZOLE SODIUM 40 MG: 40 TABLET, DELAYED RELEASE ORAL at 05:53

## 2018-10-31 RX ADMIN — SPIRONOLACTONE 50 MG: 25 TABLET ORAL at 08:41

## 2018-10-31 RX ADMIN — KETOROLAC TROMETHAMINE 30 MG: 30 INJECTION, SOLUTION INTRAMUSCULAR at 14:07

## 2018-10-31 RX ADMIN — LISINOPRIL 2.5 MG: 5 TABLET ORAL at 08:54

## 2018-10-31 RX ADMIN — MONTELUKAST SODIUM 10 MG: 10 TABLET, FILM COATED ORAL at 08:41

## 2018-10-31 RX ADMIN — CEFTRIAXONE SODIUM 1 G: 1 INJECTION, POWDER, FOR SOLUTION INTRAMUSCULAR; INTRAVENOUS at 20:33

## 2018-10-31 RX ADMIN — ENOXAPARIN SODIUM 40 MG: 40 INJECTION SUBCUTANEOUS at 20:33

## 2018-10-31 RX ADMIN — SODIUM CHLORIDE: 9 INJECTION, SOLUTION INTRAVENOUS at 10:37

## 2018-10-31 RX ADMIN — SIMVASTATIN 40 MG: 40 TABLET, FILM COATED ORAL at 20:54

## 2018-10-31 RX ADMIN — BUPROPION HYDROCHLORIDE 150 MG: 150 TABLET, FILM COATED, EXTENDED RELEASE ORAL at 08:41

## 2018-10-31 RX ADMIN — FLUTICASONE PROPIONATE 1 SPRAY: 50 SPRAY, METERED NASAL at 20:34

## 2018-10-31 RX ADMIN — KETOROLAC TROMETHAMINE 30 MG: 30 INJECTION, SOLUTION INTRAMUSCULAR at 06:11

## 2018-10-31 RX ADMIN — FLUTICASONE PROPIONATE 1 SPRAY: 50 SPRAY, METERED NASAL at 08:42

## 2018-10-31 RX ADMIN — KETOROLAC TROMETHAMINE 30 MG: 30 INJECTION, SOLUTION INTRAMUSCULAR at 21:02

## 2018-10-31 RX ADMIN — ASPIRIN 81 MG: 81 TABLET, COATED ORAL at 08:41

## 2018-10-31 RX ADMIN — SPIRONOLACTONE 50 MG: 25 TABLET ORAL at 20:54

## 2018-10-31 RX ADMIN — ENOXAPARIN SODIUM 40 MG: 40 INJECTION SUBCUTANEOUS at 08:41

## 2018-10-31 ASSESSMENT — PAIN DESCRIPTION - PAIN TYPE
TYPE: ACUTE PAIN
TYPE: ACUTE PAIN

## 2018-10-31 ASSESSMENT — ENCOUNTER SYMPTOMS
COUGH: 0
BLURRED VISION: 0
SPUTUM PRODUCTION: 0
NAUSEA: 1
BACK PAIN: 0
ABDOMINAL PAIN: 1
ORTHOPNEA: 0
VOMITING: 0
DIARRHEA: 0
WHEEZING: 0
CONSTIPATION: 0
DOUBLE VISION: 0
SORE THROAT: 0
SHORTNESS OF BREATH: 0

## 2018-10-31 ASSESSMENT — PAIN DESCRIPTION - FREQUENCY: FREQUENCY: CONTINUOUS

## 2018-10-31 ASSESSMENT — PAIN SCALES - GENERAL
PAINLEVEL_OUTOF10: 7
PAINLEVEL_OUTOF10: 8
PAINLEVEL_OUTOF10: 7
PAINLEVEL_OUTOF10: 5

## 2018-10-31 ASSESSMENT — PAIN DESCRIPTION - ORIENTATION
ORIENTATION: LOWER;LEFT
ORIENTATION: LEFT

## 2018-10-31 ASSESSMENT — PAIN DESCRIPTION - DESCRIPTORS: DESCRIPTORS: ACHING

## 2018-10-31 ASSESSMENT — PAIN DESCRIPTION - LOCATION
LOCATION: ABDOMEN;BACK
LOCATION: ABDOMEN

## 2018-10-31 NOTE — CARE COORDINATION
CASE MANAGEMENT NOTE:    Admission Date:  10/30/2018 Trang Parker is a 53 y.o.  male    Admitted for : Acute pyelonephritis [N10]  Acute pyelonephritis [N10]    Met with:  Patient    PCP:  Dr. Peterson Rae:  [facility] [place]      Current Residence/ Living Arrangements:  independently at home             Current Services PTA:  No    Is patient agreeable to VNS: No    Freedom of choice provided: Yes    List of [address] provided: No    VNS chosen:  No    DME:  CPAP    Home Oxygen: No    Nebulizer: No    CPAP/BIPAP: No    Supplier: N/A    Potential Assistance Needed: No    SNF needed: No    Pharmacy:  [facility]       Is the Patient an [address] with Readmission Risk Score greater than 14%? No  If yes, pt needs a follow up appointment made within 7 days. Does Patient want to use MEDS to BEDS? No    Family Members/Caregivers that pt would like involved in their care:    Yes    If yes, list name here: Wife Darci Banuelos    Transportation Provider:  Patient                      Discharge Plan:  10/31: [facility] - Patient is from 1-story home with spouse. He is independent with his care and drives. DME - CPAP. Declines any discharge needs. Awaiting urology input. Will follow.  //ZIGGY                  Electronically signed by: Harish Clemons RN on 10/31/2018 at 3:34 PM

## 2018-10-31 NOTE — PROGRESS NOTES
Admitted to room 2075 from ER per Bed. Oriented to room and call light. Vitals and assessment completed. No distress noted.

## 2018-10-31 NOTE — H&P
[address] [facility] [place]     HISTORY AND PHYSICAL EXAMINATION            Date:   10/31/2018  Patient name:  Wayne Gonzalez  Date of admission:  10/30/2018  5:19 PM  MRN:   [id number]  Account:  [de-identified]  YOB: 1970  PCP:    Shabbir Means MD  Room:   2075/2075-01  Code Status:    Full Code    CHIEF COMPLAINT     Chief Complaint   Patient presents with    Flank Pain     left    Abdominal Pain     left    Nausea       HISTORY OF PRESENT ILLNESS  (Character, Onset, Location, Duration,  Exacerbating/Relieving Factors, Radiation,   Associated Symptoms, Severity )      The patient is a 53 y.o. [place] male who presents with constant, sharp, severe, left  flank pain that radiates around into left lower abdomen and left groin. According to patient, pain started suddenly last evening and is reminiscent of past kidney stones. Symptoms are associated with intermittent nausea. Denies fever, chills, chest pain, cough, vomiting, diarrhea, and urinary symptoms. Symptoms are sometimes worsened with moevement. There are no alleviating factors. Symptoms are reported as constant and moderate. PAST MEDICAL HISTORY   Patient  has a past medical history of Acute pyelonephritis; Asthma; Diabetes; GERD (gastroesophageal reflux disease); Hyperlipidemia; Liver disease; MVP (mitral valve prolapse); Screening PSA (prostate specific antigen); and Unspecified sleep apnea. PAST SURGICAL HISTORY    Patient  has a past surgical history that includes Anterior cruciate ligament repair (2/2009) and Tonsillectomy (2005). FAMILY HISTORY    Patient family history is not on file. He was adopted. SOCIAL HISTORY    Patient  reports that he quit smoking about 3 years ago. His smoking use included Cigarettes. He smoked 0.00 packs per day for 1.00 year. He has never used smokeless tobacco. He reports that he drinks alcohol. He reports that he does not use drugs.      HOME MEDICATIONS        Prior HISTORY: Ordering Physician Provided Reason for Exam: left sided pain for the past 2 days. history of previous kidney stones FINDINGS: Lower Chest: Clear Organs: The abdominal wall appears normal. The liver, spleen, pancreas, and adrenals appear normal.  Gallbladder normal. Severe left renal atrophy again noted with punctate nonobstructing nephrolith. Right kidney normal. The bladder appears normal. GI/Bowel: The stomache,small bowel, and colon appear normal. Normal appendix. Pelvis: Normal Peritoneum/Retroperitoneum: The abdominal aorta and iliac arteries are normal in caliber. There is no pathologic adenopathy. Bones/Soft Tissues: Slight anterior wedging at thoracolumbar junction. Mild scoliosis. Severe left renal atrophy and nonobstructing nephrolith. ASSESSMENT  and  PLAN     Principal Problem:    Acute pyelonephritis  Active Problems:    Diabetes mellitus ([address]    Recurrent nephrolithiasis  Resolved Problems:    * No resolved hospital problems. *    Plan:    Acute Pyelonephritis  -Left back, flank, and abdominal pain  -Left CVA tenderness  -WBC- 16.1  -Rocephin 1 gm IV administered in ED  --Continue on admission  -Pain and nausea control  -CT shows left renal atrophy and nonobstructing nephrolith  --Reports history of past stones x2  ---strain all urine    Diabetes Mellitus  -hold oral hypoglycemics for now  -POCT ac and hs with sliding scale coverage    Consultations:     IP CONSULT TO INTERNAL MEDICINE  IP CONSULT TO RESPIRATORY CARE      VALERIE Ott - CNP   10/31/2018  3:32 AM    [address] Kaleb Mcneil [address] [place] [address], [address] [place] [address]. Phone [phone]     I have discussed the care of. Darwin Steiner     , including pertinent history and exam findings,    today with Abril Brantley CNP . I have seen and examined the patient and the key elements of all parts of the encounter have been performed by me .    I agree with the assessment, plan and orders as documented by Kamille Harrell

## 2018-10-31 NOTE — FLOWSHEET NOTE
10/31/18 1145   Encounter Summary   Services provided to: Patient   Referral/Consult From: Rounding   Place of [address] Completed   Complexity of Encounter Low   Length of Encounter 15 minutes   Spiritual Assessment Completed Yes   Spiritual/Voodoo   Type Spiritual support   Intervention Contacted support as requested per patient/family request   Who? Keiry Nielson   Why?  Patient request   At Request Of Patient   Sacraments   Sacrament of Sick-Anointing Anointed  (Javier Hodges 10/31/18)

## 2018-10-31 NOTE — CONSULTS
hepatosteatosis    Constipation    Gastroparesis    Mixed hyperlipidemia    Essential hypertension    Acute pyelonephritis    Recurrent nephrolithiasis       Plan:   Continue antibiotics for now  Follow up Urine culture  Suspected pyelonephritis, no obstructing stones identified  Follow Labs tomorrow  The patient will need outpatient follow up with Dr. Ez Alvarez. Consider cystoscopy to evaluate for recurrence of urethral stricture disease. Will follow     Thank you for allowing me to participate in the care of this patient. Feel free to contact [facility] if you have any questions or concerns.       [facility]  4:51 PM 10/31/2018

## 2018-11-01 VITALS
SYSTOLIC BLOOD PRESSURE: 138 MMHG | BODY MASS INDEX: 50.62 KG/M2 | HEART RATE: 70 BPM | WEIGHT: 315 LBS | RESPIRATION RATE: 16 BRPM | HEIGHT: 66 IN | DIASTOLIC BLOOD PRESSURE: 67 MMHG | OXYGEN SATURATION: 98 % | TEMPERATURE: 97.3 F

## 2018-11-01 LAB
ABSOLUTE EOS #: 0.3 K/UL (ref 0–0.4)
ABSOLUTE IMMATURE GRANULOCYTE: ABNORMAL K/UL (ref 0–0.3)
ABSOLUTE LYMPH #: 2.5 K/UL (ref 1–4.8)
ABSOLUTE MONO #: 1 K/UL (ref 0.1–1.3)
ANION GAP SERPL CALCULATED.3IONS-SCNC: 12 MMOL/L (ref 9–17)
BASOPHILS # BLD: 0 % (ref 0–2)
BASOPHILS ABSOLUTE: 0 K/UL (ref 0–0.2)
BUN BLDV-MCNC: 24 MG/DL (ref 6–20)
BUN/CREAT BLD: ABNORMAL (ref 9–20)
CALCIUM SERPL-MCNC: 9 MG/DL (ref 8.6–10.4)
CHLORIDE BLD-SCNC: 99 MMOL/L (ref 98–107)
CO2: 22 MMOL/L (ref 20–31)
CREAT SERPL-MCNC: 1.04 MG/DL (ref 0.7–1.2)
CULTURE: ABNORMAL
DIFFERENTIAL TYPE: ABNORMAL
EOSINOPHILS RELATIVE PERCENT: 3 % (ref 0–4)
GFR AFRICAN AMERICAN: >60 ML/MIN
GFR NON-AFRICAN AMERICAN: >60 ML/MIN
GFR SERPL CREATININE-BSD FRML MDRD: ABNORMAL ML/MIN/{1.73_M2}
GFR SERPL CREATININE-BSD FRML MDRD: ABNORMAL ML/MIN/{1.73_M2}
GLUCOSE BLD-MCNC: 121 MG/DL (ref 75–110)
GLUCOSE BLD-MCNC: 124 MG/DL (ref 75–110)
GLUCOSE BLD-MCNC: 130 MG/DL (ref 70–99)
HCT VFR BLD CALC: 24.6 % (ref 41–53)
HEMOGLOBIN: 8 G/DL (ref 13.5–17.5)
IMMATURE GRANULOCYTES: ABNORMAL %
LYMPHOCYTES # BLD: 20 % (ref 24–44)
Lab: ABNORMAL
MCH RBC QN AUTO: 28 PG (ref 26–34)
MCHC RBC AUTO-ENTMCNC: 32.5 G/DL (ref 31–37)
MCV RBC AUTO: 86.2 FL (ref 80–100)
MONOCYTES # BLD: 8 % (ref 1–7)
NRBC AUTOMATED: ABNORMAL PER 100 WBC
ORGANISM: ABNORMAL
PDW BLD-RTO: 13.5 % (ref 11.5–14.9)
PLATELET # BLD: 307 K/UL (ref 150–450)
PLATELET ESTIMATE: ABNORMAL
PMV BLD AUTO: 7.3 FL (ref 6–12)
POTASSIUM SERPL-SCNC: 4.1 MMOL/L (ref 3.7–5.3)
RBC # BLD: 2.85 M/UL (ref 4.5–5.9)
RBC # BLD: ABNORMAL 10*6/UL
SEG NEUTROPHILS: 69 % (ref 36–66)
SEGMENTED NEUTROPHILS ABSOLUTE COUNT: 8.8 K/UL (ref 1.3–9.1)
SODIUM BLD-SCNC: 133 MMOL/L (ref 135–144)
SPECIMEN DESCRIPTION: ABNORMAL
STATUS: ABNORMAL
WBC # BLD: 12.6 K/UL (ref 3.5–11)
WBC # BLD: ABNORMAL 10*3/UL

## 2018-11-01 PROCEDURE — 2580000003 HC RX 258: Performed by: INTERNAL MEDICINE

## 2018-11-01 PROCEDURE — 6360000002 HC RX W HCPCS: Performed by: NURSE PRACTITIONER

## 2018-11-01 PROCEDURE — 6370000000 HC RX 637 (ALT 250 FOR IP): Performed by: NURSE PRACTITIONER

## 2018-11-01 PROCEDURE — 82947 ASSAY GLUCOSE BLOOD QUANT: CPT

## 2018-11-01 PROCEDURE — 36415 COLL VENOUS BLD VENIPUNCTURE: CPT

## 2018-11-01 PROCEDURE — 99239 HOSP IP/OBS DSCHRG MGMT >30: CPT | Performed by: INTERNAL MEDICINE

## 2018-11-01 PROCEDURE — 80048 BASIC METABOLIC PNL TOTAL CA: CPT

## 2018-11-01 PROCEDURE — 6360000002 HC RX W HCPCS: Performed by: INTERNAL MEDICINE

## 2018-11-01 PROCEDURE — 85025 COMPLETE CBC W/AUTO DIFF WBC: CPT

## 2018-11-01 RX ORDER — CIPROFLOXACIN 500 MG/1
500 TABLET, FILM COATED ORAL 2 TIMES DAILY
Qty: 14 TABLET | Refills: 0 | Status: SHIPPED | OUTPATIENT
Start: 2018-11-01 | End: 2018-11-08

## 2018-11-01 RX ADMIN — ENOXAPARIN SODIUM 40 MG: 40 INJECTION SUBCUTANEOUS at 07:40

## 2018-11-01 RX ADMIN — CHLORTHALIDONE 25 MG: 25 TABLET ORAL at 07:42

## 2018-11-01 RX ADMIN — LISINOPRIL 2.5 MG: 5 TABLET ORAL at 07:41

## 2018-11-01 RX ADMIN — BUPROPION HYDROCHLORIDE 150 MG: 150 TABLET, FILM COATED, EXTENDED RELEASE ORAL at 07:41

## 2018-11-01 RX ADMIN — ASPIRIN 81 MG: 81 TABLET, COATED ORAL at 07:41

## 2018-11-01 RX ADMIN — SODIUM CHLORIDE: 9 INJECTION, SOLUTION INTRAVENOUS at 00:54

## 2018-11-01 RX ADMIN — MONTELUKAST SODIUM 10 MG: 10 TABLET, FILM COATED ORAL at 07:41

## 2018-11-01 RX ADMIN — FLUTICASONE PROPIONATE 1 SPRAY: 50 SPRAY, METERED NASAL at 11:59

## 2018-11-01 RX ADMIN — KETOROLAC TROMETHAMINE 30 MG: 30 INJECTION, SOLUTION INTRAMUSCULAR at 05:57

## 2018-11-01 RX ADMIN — PANTOPRAZOLE SODIUM 40 MG: 40 TABLET, DELAYED RELEASE ORAL at 05:58

## 2018-11-01 RX ADMIN — SPIRONOLACTONE 50 MG: 25 TABLET ORAL at 07:41

## 2018-11-01 ASSESSMENT — PAIN SCALES - GENERAL
PAINLEVEL_OUTOF10: 7
PAINLEVEL_OUTOF10: 1

## 2018-11-01 NOTE — CARE COORDINATION
ONGOING DISCHARGE PLAN:    Spoke with patient regarding discharge plan and patient confirms that plan is to return home, no needs  Here with acute pyelonephritis  Plan to d/c home on po cipro  To follow up with urology in 1 month  Will continue to follow for additional discharge needs.     Electronically signed by Gary Thorne RN on 11/1/2018 at 2:05 PM

## 2018-11-01 NOTE — PROGRESS NOTES
Signed off to neto andrea, Jozef Merritt Rn  Pt. Has no c/o of pain or SOB at this time  Bedside reporting completed.

## 2018-11-01 NOTE — FLOWSHEET NOTE
presented PT Janes Lynn with a  pin and thanked me for his service to our country he was in the [employer]. Chaplains remain available for spiritual or emotional support as needed.

## 2018-11-02 ENCOUNTER — TELEPHONE (OUTPATIENT)
Dept: FAMILY MEDICINE CLINIC | Age: 48
End: 2018-11-02

## 2018-11-05 RX ORDER — DULAGLUTIDE 1.5 MG/.5ML
1.5 INJECTION, SOLUTION SUBCUTANEOUS
Qty: 12 ML | Refills: 1 | Status: SHIPPED | OUTPATIENT
Start: 2018-11-05 | End: 2019-03-31 | Stop reason: SDUPTHER

## 2018-11-05 NOTE — TELEPHONE ENCOUNTER
Both filled 10/2018 for 90 days supply. Per pt pharmacy did not receive. Asking for rx to [facility]. Next Visit Date:  Future Appointments  Date Time Provider Melanie Castellon   11/13/2018 3:30 PM Tamara Arredondo MD [id number]   1/23/2019 5:15 PM Tamara Arredondo  [address] Maintenance   Topic Date Due    Diabetic foot exam  11/21/2017    DTaP/Tdap/Td vaccine (1 - Tdap) 10/23/2019 (Originally 1/2/2006)    Lipid screen  03/12/2019    Diabetic retinal exam  05/15/2019    A1C test (Diabetic or Prediabetic)  10/23/2019    Diabetic microalbuminuria test  10/24/2019    Potassium monitoring  11/01/2019    Creatinine monitoring  11/01/2019    Flu vaccine  Completed    Pneumococcal med risk  Completed    HIV screen  Completed       Hemoglobin A1C (%)   Date Value   10/23/2018 7.0   03/12/2018 9.0 (H)   03/27/2017 12.8 (H)             ( goal A1C is < 7)   Microalb/Crt.  Ratio (mcg/mg creat)   Date Value   04/21/2017 66 (H)     LDL Cholesterol (mg/dL)   Date Value   03/12/2018 60   03/27/2017 83       (goal LDL is <100)   AST (U/L)   Date Value   10/30/2018 20     ALT (U/L)   Date Value   10/30/2018 15     BUN (mg/dL)   Date Value   11/01/2018 24 (H)     BP Readings from Last 3 Encounters:   11/01/18 138/67   10/23/18 (!) 118/58   10/08/18 112/72          (goal 120/80)    All Future Testing planned in [facility]  Lab Frequency Next Occurrence               Patient Active Problem List:     Hyperlipidemia     MVP (mitral valve prolapse)     Sleep apnea     Diabetes mellitus (HCC)     GERD (gastroesophageal reflux disease)     Asthma     Nonalcoholic hepatosteatosis     Constipation     Gastroparesis     Mixed hyperlipidemia     Essential hypertension     Acute pyelonephritis     Recurrent nephrolithiasis

## 2018-11-13 ENCOUNTER — OFFICE VISIT (OUTPATIENT)
Dept: FAMILY MEDICINE CLINIC | Age: 48
End: 2018-11-13
Payer: COMMERCIAL

## 2018-11-13 VITALS
HEART RATE: 73 BPM | WEIGHT: 315 LBS | DIASTOLIC BLOOD PRESSURE: 66 MMHG | BODY MASS INDEX: 65.89 KG/M2 | RESPIRATION RATE: 16 BRPM | SYSTOLIC BLOOD PRESSURE: 108 MMHG | TEMPERATURE: 97.3 F | OXYGEN SATURATION: 98 %

## 2018-11-13 DIAGNOSIS — N20.0 NEPHROLITHIASIS: ICD-10-CM

## 2018-11-13 DIAGNOSIS — E11.9 TYPE 2 DIABETES MELLITUS WITHOUT COMPLICATION, WITHOUT LONG-TERM CURRENT USE OF INSULIN (HCC): ICD-10-CM

## 2018-11-13 DIAGNOSIS — N10 PYELONEPHRITIS, ACUTE: Primary | ICD-10-CM

## 2018-11-13 DIAGNOSIS — R30.0 DYSURIA: ICD-10-CM

## 2018-11-13 LAB
BILIRUBIN, POC: ABNORMAL
BLOOD URINE, POC: ABNORMAL
CLARITY, POC: ABNORMAL
COLOR, POC: ABNORMAL
GLUCOSE URINE, POC: ABNORMAL
KETONES, POC: ABNORMAL
LEUKOCYTE EST, POC: ABNORMAL
NITRITE, POC: ABNORMAL
PH, POC: 5
PROTEIN, POC: ABNORMAL
SPECIFIC GRAVITY, POC: 1.01
UROBILINOGEN, POC: 0.2

## 2018-11-13 PROCEDURE — 81003 URINALYSIS AUTO W/O SCOPE: CPT | Performed by: INTERNAL MEDICINE

## 2018-11-13 PROCEDURE — 1111F DSCHRG MED/CURRENT MED MERGE: CPT | Performed by: INTERNAL MEDICINE

## 2018-11-13 PROCEDURE — 99495 TRANSJ CARE MGMT MOD F2F 14D: CPT | Performed by: INTERNAL MEDICINE

## 2018-11-13 RX ORDER — CIPROFLOXACIN 500 MG/1
500 TABLET, FILM COATED ORAL 2 TIMES DAILY
Qty: 20 TABLET | Refills: 0 | Status: SHIPPED | OUTPATIENT
Start: 2018-11-13 | End: 2018-11-23

## 2018-11-13 RX ORDER — NALTREXONE HYDROCHLORIDE 50 MG/1
TABLET, FILM COATED ORAL
Refills: 0 | COMMUNITY
Start: 2018-10-29 | End: 2019-06-18

## 2018-11-13 RX ORDER — BUPROPION HYDROCHLORIDE 300 MG/1
TABLET ORAL
Refills: 0 | COMMUNITY
Start: 2018-10-29 | End: 2019-01-23 | Stop reason: SDUPTHER

## 2018-11-13 NOTE — PROGRESS NOTES
Patient is present for a hospital follow up. He went to [facility] for what he thought was kidney stones. Pt had a bad kidney infection. He did finish the ATB that was given to him but he still feels some discomfort when he urinates. Some back pain is present. Pt does plan on following up with urology. No other concerns at this time. Visit Information    Have you changed or started any medications since your last visit including any over-the-counter medicines, vitamins, or herbal medicines? no   Have you stopped taking any of your medications? Is so, why? -  no  Are you having any side effects from any of your medications? - no    Have you seen any other physician or provider since your last visit?  no   Have you had any other diagnostic tests since your last visit?  no   Have you been seen in the emergency room and/or had an admission in a hospital since we last saw you?  yes - [facility] 10/30/18   Have you had your routine dental cleaning in the past 6 months?  no     Do you have an active MyChart account? If no, what is the barrier?   Yes    Patient Care Team:  Gerard Salgado MD as PCP - General (Internal Medicine)  Heather Felix MD (Pulmonology)  Irene Corrales MD as Consulting Physician (Cardiology)  Ziyad Smith MD as Consulting Physician (Endocrinology)    Medical History Review  Past Medical, Family, and Social History reviewed and does not contribute to the patient presenting condition    Health Maintenance   Topic Date Due    Diabetic foot exam  11/21/2017    DTaP/Tdap/Td vaccine (1 - Tdap) 10/23/2019 (Originally 1/2/2006)    Lipid screen  03/12/2019    Diabetic retinal exam  05/15/2019    A1C test (Diabetic or Prediabetic)  10/23/2019    Diabetic microalbuminuria test  10/24/2019    Potassium monitoring  11/01/2019    Creatinine monitoring  11/01/2019    Flu vaccine  Completed    Pneumococcal med risk  Completed    HIV screen  Completed
 CRANBERRY 1,680 mg by Does not apply route.  FISH OIL Take 1,000 mg by mouth daily. Please get dosage      VITAMIN D PO Take 2,000 Int'l Units by mouth Please get dosage        aspirin 81 MG tablet Take 81 mg by mouth daily. Medications patient taking as of now reconciled against medications ordered at time of hospital discharge: Yes    Chief Complaint   Patient presents with    Follow-Up from [facility] 10/30/18       History of Present illness - Follow up of Hospital diagnosis(es): admitted with L pyelonephritis - discharged home on cipro. Urine culture grew Enterococcus. Inpatient course: Discharge summary reviewed- see chart. Interval history/Current status: finished antibiotics about a week ago. Feels okay, every once in a while has left flank pain radiating to the front. Appetite is still on and off. No diarrhea, nausea, vomiting. Still has some dysuria, more consistent the last couple of days - no hematuria, fevers, chills. Constipated the past couple of days. Went back to work a week ago. Doing well without too many issues. A comprehensive review of systems was negative except for what was noted in the HPI. Vitals:    11/13/18 1552   BP: 108/66   Site: Right Upper Arm   Position: Sitting   Cuff Size: Large Adult   Pulse: 73   Resp: 16   Temp: 97.3 °F (36.3 °C)   TempSrc: Oral   SpO2: 98%   Weight: (!) 408 lb 3.2 oz (185.2 kg)     Body mass index is 65.89 kg/m². Wt Readings from Last 3 Encounters:   11/13/18 (!) 408 lb 3.2 oz (185.2 kg)   11/01/18 (!) 410 lb 7.9 oz (186.2 kg)   10/23/18 (!) 413 lb 6.4 oz (187.5 kg)     BP Readings from Last 3 Encounters:   11/13/18 108/66   11/01/18 138/67   10/23/18 (!) 118/58        Physical Exam:  General Appearance: alert and oriented to person, place and time, well developed and well- nourished, in no acute distress. Morbidly obese.    Skin: warm and dry, no rash or erythema  Head: normocephalic and atraumatic  Eyes:

## 2018-11-17 DIAGNOSIS — M25.562 CHRONIC PAIN OF LEFT KNEE: ICD-10-CM

## 2018-11-17 DIAGNOSIS — G89.29 CHRONIC PAIN OF LEFT KNEE: ICD-10-CM

## 2018-11-19 RX ORDER — IBUPROFEN 800 MG/1
TABLET ORAL
Qty: 270 TABLET | Refills: 1 | Status: SHIPPED | OUTPATIENT
Start: 2018-11-19 | End: 2019-06-10 | Stop reason: SDUPTHER

## 2018-11-19 NOTE — TELEPHONE ENCOUNTER
Filled 10/23/18 #30 with 0 RF    Next Visit Date:  Future Appointments  Date Time Provider Melanie Arnoldi   1/23/2019 5:15 PM Tamara Tristan  [address] Maintenance   Topic Date Due    Diabetic foot exam  11/21/2017    DTaP/Tdap/Td vaccine (1 - Tdap) 10/23/2019 (Originally 1/2/2006)    Lipid screen  03/12/2019    Diabetic retinal exam  05/15/2019    A1C test (Diabetic or Prediabetic)  10/23/2019    Diabetic microalbuminuria test  10/24/2019    Potassium monitoring  11/01/2019    Creatinine monitoring  11/01/2019    Flu vaccine  Completed    Pneumococcal med risk  Completed    HIV screen  Completed       Hemoglobin A1C (%)   Date Value   10/23/2018 7.0   03/12/2018 9.0 (H)   03/27/2017 12.8 (H)             ( goal A1C is < 7)   Microalb/Crt.  Ratio (mcg/mg creat)   Date Value   04/21/2017 66 (H)     LDL Cholesterol (mg/dL)   Date Value   03/12/2018 60   03/27/2017 83       (goal LDL is <100)   AST (U/L)   Date Value   10/30/2018 20     ALT (U/L)   Date Value   10/30/2018 15     BUN (mg/dL)   Date Value   11/01/2018 24 (H)     BP Readings from Last 3 Encounters:   11/13/18 108/66   11/01/18 138/67   10/23/18 (!) 118/58          (goal 120/80)    All Future Testing planned in [facility]  Lab Frequency Next Occurrence               Patient Active Problem List:     Hyperlipidemia     MVP (mitral valve prolapse)     Sleep apnea     Diabetes mellitus (HCC)     GERD (gastroesophageal reflux disease)     Asthma     Nonalcoholic hepatosteatosis     Constipation     Gastroparesis     Mixed hyperlipidemia     Essential hypertension     Acute pyelonephritis     Recurrent nephrolithiasis

## 2018-12-18 ENCOUNTER — OFFICE VISIT (OUTPATIENT)
Dept: UROLOGY | Age: 48
End: 2018-12-18
Payer: COMMERCIAL

## 2018-12-18 VITALS
HEART RATE: 61 BPM | HEIGHT: 67 IN | SYSTOLIC BLOOD PRESSURE: 115 MMHG | BODY MASS INDEX: 49.44 KG/M2 | DIASTOLIC BLOOD PRESSURE: 53 MMHG | TEMPERATURE: 98 F | WEIGHT: 315 LBS

## 2018-12-18 DIAGNOSIS — N39.0 URINARY TRACT INFECTION WITHOUT HEMATURIA, SITE UNSPECIFIED: Primary | ICD-10-CM

## 2018-12-18 DIAGNOSIS — R10.9 FLANK PAIN: ICD-10-CM

## 2018-12-18 DIAGNOSIS — N20.0 RENAL STONE: ICD-10-CM

## 2018-12-18 LAB
BILIRUBIN, POC: NORMAL
BLOOD URINE, POC: NORMAL
CLARITY, POC: CLEAR
COLOR, POC: YELLOW
GLUCOSE URINE, POC: NORMAL
KETONES, POC: NORMAL
LEUKOCYTE EST, POC: NORMAL
NITRITE, POC: NORMAL
PH, POC: NORMAL
PROTEIN, POC: NORMAL
SPECIFIC GRAVITY, POC: NORMAL
UROBILINOGEN, POC: NORMAL

## 2018-12-18 PROCEDURE — G8427 DOCREV CUR MEDS BY ELIG CLIN: HCPCS | Performed by: UROLOGY

## 2018-12-18 PROCEDURE — 1036F TOBACCO NON-USER: CPT | Performed by: UROLOGY

## 2018-12-18 PROCEDURE — G8482 FLU IMMUNIZE ORDER/ADMIN: HCPCS | Performed by: UROLOGY

## 2018-12-18 PROCEDURE — G8417 CALC BMI ABV UP PARAM F/U: HCPCS | Performed by: UROLOGY

## 2018-12-18 PROCEDURE — 81002 URINALYSIS NONAUTO W/O SCOPE: CPT | Performed by: UROLOGY

## 2018-12-18 PROCEDURE — 99214 OFFICE O/P EST MOD 30 MIN: CPT | Performed by: UROLOGY

## 2018-12-18 RX ORDER — METHENAMINE HIPPURATE 1000 MG/1
1 TABLET ORAL 2 TIMES DAILY WITH MEALS
Qty: 60 TABLET | Refills: 5 | Status: SHIPPED | OUTPATIENT
Start: 2018-12-18 | End: 2019-07-04 | Stop reason: SDUPTHER

## 2018-12-18 ASSESSMENT — ENCOUNTER SYMPTOMS
WHEEZING: 0
VOMITING: 0
NAUSEA: 0
EYE REDNESS: 0
COLOR CHANGE: 0
EYE PAIN: 0
SHORTNESS OF BREATH: 0
ABDOMINAL PAIN: 0
BACK PAIN: 0
COUGH: 0

## 2018-12-18 NOTE — PROGRESS NOTES
urinary stream?: Not at all  STRAINING: How often have you had to strain to start  urination?: Less than 1 to 5 times  NOCTURIA: How many times did you typically get up at night to uriniate?: 1 Time  TOTAL I-PSS SCORE[de-identified] 4  How would you feel if you were to spend the rest of your life with your urinary condition?: Mixe    Last BUN and creatinine:  Lab Results   Component Value Date    BUN 24 (H) 11/01/2018     Lab Results   Component Value Date    CREATININE 1.04 11/01/2018       Additional Lab/Culture results: none    Imaging Reviewed during this Office Visit: none  (results were independently reviewed by physician and radiology report verified)    PAST MEDICAL, FAMILY AND SOCIAL HISTORY UPDATE:  Past Medical History:   Diagnosis Date    Acute pyelonephritis 10/30/2018    Asthma     Diabetes     borderline    GERD (gastroesophageal reflux disease)     Hyperlipidemia     Liver disease     MVP (mitral valve prolapse)     Screening PSA (prostate specific antigen) 07/13/2009    Unspecified sleep apnea      Past Surgical History:   Procedure Laterality Date    ANTERIOR CRUCIATE LIGAMENT REPAIR  2/2009    TONSILLECTOMY  2005     Family History   Problem Relation Age of Onset    Adopted: Yes     Outpatient Prescriptions Marked as Taking for the 12/18/18 encounter (Office Visit) with Marlys Valdez MD   Medication Sig Dispense Refill    methenamine (HIPREX) 1 g tablet Take 1 tablet by mouth 2 times daily (with meals) 60 tablet 5    ibuprofen (ADVIL;MOTRIN) 800 MG tablet take 1 tablet by mouth every 8 hours if needed for pain 270 tablet 1    buPROPion (WELLBUTRIN XL) 300 MG extended release tablet take 1 tablet by mouth every morning  0    naltrexone (DEPADE) 50 MG tablet take 1 tablet by mouth at bedtime  0    SITagliptin-metFORMIN (JANUMET XR)  MG TB24 per extended release tablet Take 1 tablet by mouth 2 times daily 180 tablet 1    TRULICITY 1.5 AC/8.1EU SOPN Inject 1.5 mg into the skin every 7 days 12 mL 1    chlorthalidone (HYGROTON) 25 MG tablet TAKE 1 TABLET BY MOUTH  DAILY 90 tablet 1    lisinopril (PRINIVIL;ZESTRIL) 2.5 MG tablet TAKE 1 TABLET BY MOUTH  DAILY 90 tablet 1    spironolactone (ALDACTONE) 50 MG tablet TAKE 1 TABLET BY MOUTH TWO  TIMES DAILY 180 tablet 1    simvastatin (ZOCOR) 40 MG tablet Take 1 tablet by mouth  nightly 90 tablet 1    zolpidem (AMBIEN) 10 MG tablet Take 10 mg by mouth as needed. [place] 0    pioglitazone (ACTOS) 15 MG tablet TAKE 1 TABLET BY MOUTH ONCE DAILY 90 tablet 1    albuterol sulfate  (90 Base) MCG/ACT inhaler Inhale 2 puffs into the lungs every 6 hours as needed for Shortness of Breath 1 Inhaler 5    montelukast (SINGULAIR) 10 MG tablet Take 1 tablet by mouth daily 30 tablet 5    Misc Natural Products (GLUCOSAMINE CHONDROITIN ADV) TABS Take by mouth      Elastic Bandages & Supports (V-2 HIGH COMPRESSION HOSE) MISC 30-40mmHg pressure stockings, knee high. Wear 12 hours while awake and remove at bedtime 2 each 0    Blood Glucose Monitoring Suppl ([employer] CONTOUR MONITOR) W/DEVICE KIT use twice a day  0    MICROLET LANCETS MISC use twice a day  0    ALLEGRA-D ALLERGY & CONGESTION 180-240 MG per extended release tablet take 1 tablet by mouth once daily  0    fluticasone (FLONASE) 50 MCG/ACT SUSP 1 spray by Nasal route 2 times daily 1 Bottle 0    glucose blood VI test strips (BL TEST STRIP PACK) strip 1 each by Does not apply route 2 times daily. As needed. 100 each 3    omeprazole (PRILOSEC) 20 MG capsule Take 1 capsule by mouth Daily. 30 capsule 3    nitroGLYCERIN (NITROSTAT) 0.4 MG SL tablet Place 0.4 mg under the tongue every 5 minutes as needed.  CRANBERRY 1,680 mg by Does not apply route.  FISH OIL Take 1,000 mg by mouth daily. Please get dosage      VITAMIN D PO Take 2,000 Int'l Units by mouth Please get dosage        aspirin 81 MG tablet Take 81 mg by mouth daily. Patient has no known allergies.   History   Smoking Status  Former Smoker    Packs/day: 0.00    Years: 1.00    Types: Cigarettes    Quit date: 7/9/2015   Smokeless Tobacco    Never Used     (Ifpatient a smoker, smoking cessation counseling offered)    History   Alcohol Use    0.0 oz/week     Comment: 1-2/month       REVIEW OF SYSTEMS:  Review of Systems    Physical Exam:      Vitals:    12/18/18 1020   BP: (!) 115/53   Pulse: 61   Temp: 98 °F (36.7 °C)     Body mass index is 61.55 kg/m². Patient is a 53 y.o. male in no acute distress and alert and oriented to person, place and time. Physical Exam  Constitutional: Patient in no acute distress. Neuro: Alert and oriented to person, place and time. Psych: Mood normal, affect normal  Skin: No rash noted  Lungs: Respiratory effort is normal  Cardiovascular: Warm & Pink  Abdomen: Soft, non-tender, non-distended with no CVA,  No flank tenderness,  Or hepatosplenomegaly   Lymphatics: No palpablelymphadenopathy. Bladder non-tender and not distended. Musculoskeletal: Normal gait and station      Assessment and Plan      1. Urinary tract infection without hematuria, site unspecified    2. Renal stone    3. Flank pain           Plan:         Reviewed CT, no hydro, stone does not need treatment. Flank pain not likely urologic, as U/A is negative. Will start hiprex  F/U in 6 months. Return in about 6 months (around 6/18/2019). Prescriptions Ordered:  Orders Placed This Encounter   Medications    methenamine (HIPREX) 1 g tablet     Sig: Take 1 tablet by mouth 2 times daily (with meals)     Dispense:  60 tablet     Refill:  5     Orders Placed:  Orders Placed This Encounter   Procedures    POCT Urinalysis no Micro          Geni Anand MD    Agree with the ROS entered by the MA.

## 2018-12-18 NOTE — LETTER
[facility]  [facility] [place]  [address]  [address]  [address] [facility] [address]  Dept: [phone]  Dept Fax: [phone]        12/18/18    Patient: Indy Rondon  YOB: 1970    Dear Shey York MD,    I had the pleasure of seeing one of your patients, Neetu Chinchilla today in the office today. Below are the relevant portions of my assessment and plan of care. IMPRESSION:     1. Urinary tract infection without hematuria, site unspecified    2. Renal stone    3. Flank pain        PLAN:        Reviewed CT, no hydro, stone does not need treatment. Flank pain not likely urologic, as U/A is negative. Will start hiprex  F/U in 6 months. Return in about 6 months (around 6/18/2019). Prescriptions Ordered:  Orders Placed This Encounter   Medications    methenamine (HIPREX) 1 g tablet     Sig: Take 1 tablet by mouth 2 times daily (with meals)     Dispense:  60 tablet     Refill:  5     Orders Placed:  Orders Placed This Encounter   Procedures    POCT Urinalysis no Micro       Thank you for allowing me to participate in the care of this patient. I will keep you updated on this patient's follow up and I look forward to serving you and your patients again in the future.         Matthew Reed MD

## 2018-12-18 NOTE — PROGRESS NOTES
Review of Systems   Constitutional: Negative for appetite change, chills and fever. Eyes: Negative for pain, redness and visual disturbance. Respiratory: Negative for cough, shortness of breath and wheezing. Cardiovascular: Negative for chest pain and leg swelling. Gastrointestinal: Negative for abdominal pain, nausea and vomiting. Genitourinary: Positive for dysuria (irritation ), flank pain and urgency. Negative for difficulty urinating, discharge, frequency, hematuria, scrotal swelling and testicular pain. Musculoskeletal: Negative for back pain, joint swelling and myalgias. Skin: Negative for color change, rash and wound. Neurological: Negative for dizziness, tremors and numbness. Hematological: Negative for adenopathy. Does not bruise/bleed easily.

## 2019-01-23 ENCOUNTER — OFFICE VISIT (OUTPATIENT)
Dept: FAMILY MEDICINE CLINIC | Age: 49
End: 2019-01-23
Payer: COMMERCIAL

## 2019-01-23 VITALS
DIASTOLIC BLOOD PRESSURE: 56 MMHG | OXYGEN SATURATION: 99 % | SYSTOLIC BLOOD PRESSURE: 112 MMHG | WEIGHT: 315 LBS | RESPIRATION RATE: 16 BRPM | TEMPERATURE: 97.7 F | BODY MASS INDEX: 62.3 KG/M2 | HEART RATE: 75 BPM

## 2019-01-23 DIAGNOSIS — I10 ESSENTIAL HYPERTENSION: ICD-10-CM

## 2019-01-23 DIAGNOSIS — E11.9 TYPE 2 DIABETES MELLITUS WITHOUT COMPLICATION, WITHOUT LONG-TERM CURRENT USE OF INSULIN (HCC): Primary | ICD-10-CM

## 2019-01-23 DIAGNOSIS — K21.9 GASTROESOPHAGEAL REFLUX DISEASE WITHOUT ESOPHAGITIS: ICD-10-CM

## 2019-01-23 DIAGNOSIS — J45.20 MILD INTERMITTENT ASTHMA WITHOUT COMPLICATION: ICD-10-CM

## 2019-01-23 DIAGNOSIS — E78.2 MIXED HYPERLIPIDEMIA: ICD-10-CM

## 2019-01-23 DIAGNOSIS — F33.1 MODERATE EPISODE OF RECURRENT MAJOR DEPRESSIVE DISORDER (HCC): ICD-10-CM

## 2019-01-23 LAB — HBA1C MFR BLD: 6.7 %

## 2019-01-23 PROCEDURE — 99214 OFFICE O/P EST MOD 30 MIN: CPT | Performed by: INTERNAL MEDICINE

## 2019-01-23 PROCEDURE — 1036F TOBACCO NON-USER: CPT | Performed by: INTERNAL MEDICINE

## 2019-01-23 PROCEDURE — G8417 CALC BMI ABV UP PARAM F/U: HCPCS | Performed by: INTERNAL MEDICINE

## 2019-01-23 PROCEDURE — G8427 DOCREV CUR MEDS BY ELIG CLIN: HCPCS | Performed by: INTERNAL MEDICINE

## 2019-01-23 PROCEDURE — G8482 FLU IMMUNIZE ORDER/ADMIN: HCPCS | Performed by: INTERNAL MEDICINE

## 2019-01-23 PROCEDURE — 3044F HG A1C LEVEL LT 7.0%: CPT | Performed by: INTERNAL MEDICINE

## 2019-01-23 PROCEDURE — 83036 HEMOGLOBIN GLYCOSYLATED A1C: CPT | Performed by: INTERNAL MEDICINE

## 2019-01-23 PROCEDURE — 2022F DILAT RTA XM EVC RTNOPTHY: CPT | Performed by: INTERNAL MEDICINE

## 2019-01-23 RX ORDER — BUPROPION HYDROCHLORIDE 300 MG/1
300 TABLET ORAL EVERY MORNING
Qty: 90 TABLET | Refills: 1 | Status: SHIPPED | OUTPATIENT
Start: 2019-01-23 | End: 2019-06-18 | Stop reason: SDUPTHER

## 2019-01-23 RX ORDER — OMEPRAZOLE 20 MG/1
20 CAPSULE, DELAYED RELEASE ORAL DAILY
Qty: 90 CAPSULE | Refills: 1 | Status: SHIPPED | OUTPATIENT
Start: 2019-01-23 | End: 2019-07-17 | Stop reason: SDUPTHER

## 2019-02-10 DIAGNOSIS — E11.8 TYPE 2 DIABETES MELLITUS WITH COMPLICATION, WITHOUT LONG-TERM CURRENT USE OF INSULIN (HCC): ICD-10-CM

## 2019-02-12 RX ORDER — PIOGLITAZONEHYDROCHLORIDE 15 MG/1
TABLET ORAL
Qty: 90 TABLET | Refills: 1 | Status: SHIPPED | OUTPATIENT
Start: 2019-02-12 | End: 2019-10-19 | Stop reason: SDUPTHER

## 2019-03-07 ENCOUNTER — HOSPITAL ENCOUNTER (OUTPATIENT)
Age: 49
Setting detail: SPECIMEN
Discharge: HOME | End: 2019-03-07
Payer: COMMERCIAL

## 2019-03-07 DIAGNOSIS — E78.2 MIXED HYPERLIPIDEMIA: ICD-10-CM

## 2019-03-07 LAB
ALBUMIN SERPL-MCNC: 4 G/DL (ref 3.5–5.2)
ALBUMIN/GLOBULIN RATIO: 1.1 (ref 1–2.5)
ALP BLD-CCNC: 94 U/L (ref 40–129)
ALT SERPL-CCNC: 18 U/L (ref 5–41)
ANION GAP SERPL CALCULATED.3IONS-SCNC: 16 MMOL/L (ref 9–17)
AST SERPL-CCNC: 17 U/L
BILIRUB SERPL-MCNC: 0.21 MG/DL (ref 0.3–1.2)
BUN BLDV-MCNC: 28 MG/DL (ref 6–20)
BUN/CREAT BLD: ABNORMAL (ref 9–20)
CALCIUM SERPL-MCNC: 9.5 MG/DL (ref 8.6–10.4)
CHLORIDE BLD-SCNC: 105 MMOL/L (ref 98–107)
CHOLESTEROL, FASTING: 123 MG/DL
CHOLESTEROL/HDL RATIO: 3.1
CO2: 19 MMOL/L (ref 20–31)
CREAT SERPL-MCNC: 1.09 MG/DL (ref 0.7–1.2)
GFR AFRICAN AMERICAN: >60 ML/MIN
GFR NON-AFRICAN AMERICAN: >60 ML/MIN
GFR SERPL CREATININE-BSD FRML MDRD: ABNORMAL ML/MIN/{1.73_M2}
GFR SERPL CREATININE-BSD FRML MDRD: ABNORMAL ML/MIN/{1.73_M2}
GLUCOSE BLD-MCNC: 123 MG/DL (ref 70–99)
HDLC SERPL-MCNC: 40 MG/DL
LDL CHOLESTEROL: 54 MG/DL (ref 0–130)
POTASSIUM SERPL-SCNC: 4.9 MMOL/L (ref 3.7–5.3)
SODIUM BLD-SCNC: 140 MMOL/L (ref 135–144)
TOTAL PROTEIN: 7.8 G/DL (ref 6.4–8.3)
TRIGLYCERIDE, FASTING: 146 MG/DL
VLDLC SERPL CALC-MCNC: ABNORMAL MG/DL (ref 1–30)

## 2019-03-23 DIAGNOSIS — I10 ESSENTIAL HYPERTENSION: ICD-10-CM

## 2019-03-25 RX ORDER — SIMVASTATIN 40 MG
TABLET ORAL
Qty: 90 TABLET | Refills: 1 | OUTPATIENT
Start: 2019-03-25

## 2019-03-25 RX ORDER — SPIRONOLACTONE 50 MG/1
TABLET, FILM COATED ORAL
Qty: 180 TABLET | Refills: 1 | OUTPATIENT
Start: 2019-03-25

## 2019-03-25 RX ORDER — CHLORTHALIDONE 25 MG/1
25 TABLET ORAL DAILY
Qty: 90 TABLET | Refills: 1 | OUTPATIENT
Start: 2019-03-25

## 2019-03-25 RX ORDER — LISINOPRIL 2.5 MG/1
2.5 TABLET ORAL DAILY
Qty: 90 TABLET | Refills: 1 | OUTPATIENT
Start: 2019-03-25

## 2019-04-02 RX ORDER — DULAGLUTIDE 1.5 MG/.5ML
INJECTION, SOLUTION SUBCUTANEOUS
Qty: 6 ML | Refills: 2 | Status: SHIPPED | OUTPATIENT
Start: 2019-04-02 | End: 2021-10-02

## 2019-04-16 RX ORDER — SITAGLIPTIN AND METFORMIN HYDROCHLORIDE 1000; 50 MG/1; MG/1
TABLET, FILM COATED, EXTENDED RELEASE ORAL
Qty: 180 TABLET | Refills: 1 | Status: SHIPPED | OUTPATIENT
Start: 2019-04-16 | End: 2019-10-27 | Stop reason: SDUPTHER

## 2019-04-23 ENCOUNTER — OFFICE VISIT (OUTPATIENT)
Dept: FAMILY MEDICINE CLINIC | Age: 49
End: 2019-04-23
Payer: COMMERCIAL

## 2019-04-23 VITALS
HEART RATE: 75 BPM | BODY MASS INDEX: 64.4 KG/M2 | RESPIRATION RATE: 16 BRPM | DIASTOLIC BLOOD PRESSURE: 56 MMHG | WEIGHT: 315 LBS | OXYGEN SATURATION: 97 % | TEMPERATURE: 97.3 F | SYSTOLIC BLOOD PRESSURE: 100 MMHG

## 2019-04-23 DIAGNOSIS — I10 ESSENTIAL HYPERTENSION: ICD-10-CM

## 2019-04-23 DIAGNOSIS — E66.01 MORBID OBESITY WITH BMI OF 60.0-69.9, ADULT (HCC): ICD-10-CM

## 2019-04-23 DIAGNOSIS — E08.00 DIABETES MELLITUS DUE TO UNDERLYING CONDITION WITH HYPEROSMOLARITY WITHOUT COMA, WITH LONG-TERM CURRENT USE OF INSULIN (HCC): Primary | ICD-10-CM

## 2019-04-23 DIAGNOSIS — L84 FOOT CALLUS: ICD-10-CM

## 2019-04-23 DIAGNOSIS — E78.2 MIXED HYPERLIPIDEMIA: ICD-10-CM

## 2019-04-23 DIAGNOSIS — Z79.4 DIABETES MELLITUS DUE TO UNDERLYING CONDITION WITH HYPEROSMOLARITY WITHOUT COMA, WITH LONG-TERM CURRENT USE OF INSULIN (HCC): Primary | ICD-10-CM

## 2019-04-23 LAB — HBA1C MFR BLD: 6.7 %

## 2019-04-23 PROCEDURE — G8417 CALC BMI ABV UP PARAM F/U: HCPCS | Performed by: INTERNAL MEDICINE

## 2019-04-23 PROCEDURE — 1036F TOBACCO NON-USER: CPT | Performed by: INTERNAL MEDICINE

## 2019-04-23 PROCEDURE — 83036 HEMOGLOBIN GLYCOSYLATED A1C: CPT | Performed by: INTERNAL MEDICINE

## 2019-04-23 PROCEDURE — G8428 CUR MEDS NOT DOCUMENT: HCPCS | Performed by: INTERNAL MEDICINE

## 2019-04-23 PROCEDURE — 99214 OFFICE O/P EST MOD 30 MIN: CPT | Performed by: INTERNAL MEDICINE

## 2019-04-23 RX ORDER — CHLORTHALIDONE 25 MG/1
25 TABLET ORAL DAILY
Qty: 90 TABLET | Refills: 1 | Status: SHIPPED | OUTPATIENT
Start: 2019-04-23 | End: 2021-10-02

## 2019-04-23 RX ORDER — KETOCONAZOLE 20 MG/G
CREAM TOPICAL
Qty: 30 G | Refills: 1 | Status: SHIPPED | OUTPATIENT
Start: 2019-04-23 | End: 2021-10-02

## 2019-04-23 RX ORDER — SPIRONOLACTONE 50 MG/1
TABLET, FILM COATED ORAL
Qty: 180 TABLET | Refills: 1 | Status: SHIPPED | OUTPATIENT
Start: 2019-04-23 | End: 2019-10-27 | Stop reason: SDUPTHER

## 2019-04-23 RX ORDER — SIMVASTATIN 40 MG
TABLET ORAL
Qty: 90 TABLET | Refills: 1 | Status: SHIPPED | OUTPATIENT
Start: 2019-04-23

## 2019-04-23 RX ORDER — LISINOPRIL 2.5 MG/1
2.5 TABLET ORAL DAILY
Qty: 90 TABLET | Refills: 1 | Status: SHIPPED | OUTPATIENT
Start: 2019-04-23 | End: 2019-11-05 | Stop reason: SDUPTHER

## 2019-04-23 NOTE — PROGRESS NOTES
Subjective:       Patient ID: Jess Russell is a 53 y.o. male who presents for   Chief Complaint   Patient presents with    Diabetes    Insomnia    Headache    Results       HPI:  Nursing note reviewed and discussed with patient. Diabetes Mellitus  Patient presents for follow up of diabetes. Current symptoms include: none. Symptoms have progressed to a point and plateaued. Patient denies foot ulcerations, hyperglycemia, increase appetite, nausea, polydipsia, polyuria and visual disturbances. Evaluation to date has included: hemoglobin A1C. Home sugars: checking intermittently - 113 fasting yesterday and 127 nonfasting today . Current treatment: janumet XR 50-1000mg BID, actos 83KG PO QD, trulicity weekly . Last dilated eye exam: one year ago at [facility], has follow-up scheduled. Has not seen endocrine in over six months. Additional Complaints:  Morbid obesity - he has been obese for years. Has one can of coke at lunch. Walking a lot at work. Watching what he is eating most of the time but not the past couple of weeks  L knee pain - had surgery on it a couple of years ago to replace ACL and meniscus and he is having pain towards the end of the day. Does swell up a little bit. He takes ibuprofen OTC which helps with the swelling but not with the pain. Would like to go back on the 800mg ibuprofen. --> has started to hurt since the weather changed. Difficulty falling asleep lately. He has been taking ambien lately from his psychiatrist but it does not always work. Goes to bed about 9-9:30, has to wake up at 4 am. No difficulty staying asleep. Sometimes tired when he wakes up in the morning. He has been taking melatonin for about a week now. Has not tried benadryl yet. Patient's medications, allergies, past medical, surgical, social and family histories were reviewed and updated as appropriate.       Social History     Tobacco Use    Smoking status: Former Smoker     Packs/day: 0.00 Years: 1.00     Pack years: 0.00     Types: Cigarettes     Last attempt to quit: 7/9/2015     Years since quitting: 3.7    Smokeless tobacco: Never Used   Substance Use Topics    Alcohol use: Yes     Alcohol/week: 0.0 oz     Comment: 1-2/month        Review of Systems  Energy level good overall, and weight is stable. No chest pain or shortness of breath. Bowels have been normal without constipation or diarrhea         Objective:        Physical Exam:  BP (!) 100/56 (Site: Right Upper Arm, Position: Sitting, Cuff Size: Large Adult)   Pulse 75   Temp 97.3 °F (36.3 °C) (Oral)   Resp 16   Wt (!) 411 lb 3.2 oz (186.5 kg)   SpO2 97%   BMI 64.40 kg/m²   Wt Readings from Last 3 Encounters:   04/23/19 (!) 411 lb 3.2 oz (186.5 kg)   01/23/19 (!) 397 lb 12.8 oz (180.4 kg)   12/18/18 (!) 393 lb (178.3 kg)        General: Alert and oriented, in no distress. Patient ambulating with normal gait. Central obesity. Chest: clear with no wheezes or rales. No retractions, or use of accessory muscles noted. Cardiovascular: PMI is not displaced, and no thrill noted. Regular rate and rhythm with no rub, murmur or gallop. There is no peripheral edema. Pedal pulses are normal.   Abdomen: Abdomen is soft and nontender. The bowel sounds are normal.   Musculoskeletal: There are no deformities of the the extremities. Patient has all ten fingers intact. The patient has full range of motion on all 4 extremities without pain. Skin: The skin is warm and dry. There are no rashes noted. Prior to Visit Medications    Medication Sig Taking?  Authorizing Provider   JANUMET XR  MG TB24 per extended release tablet TAKE 1 TABLET BY MOUTH  TWICE A DAY Yes Tamara Chirinos MD   TRULICITY 1.5 BS/2.4XA SOPN INJECT THE CONTENTS OF 1  PEN SUBCUTANEOUSLY EVERY 7  DAYS Yes Jennifer De La Vega APRN - CNP   pioglitazone (ACTOS) 15 MG tablet TAKE 1 TABLET BY MOUTH ONCE DAILY Yes Tamara Chirinos MD   Multiple Vitamin (ONE-A-DAY MENS PO) Take by mouth daily Yes Historical Provider, MD   buPROPion (WELLBUTRIN XL) 300 MG extended release tablet Take 1 tablet by mouth every morning Yes Joel Pope MD   omeprazole (PRILOSEC) 20 MG delayed release capsule Take 1 capsule by mouth Daily Yes Tamara Flores MD   methenamine (HIPREX) 1 g tablet Take 1 tablet by mouth 2 times daily (with meals) Yes Justin Antonio MD   ibuprofen (ADVIL;MOTRIN) 800 MG tablet take 1 tablet by mouth every 8 hours if needed for pain Yes VALERIE Hernandes CNP   naltrexone (DEPADE) 50 MG tablet take 1 tablet by mouth at bedtime Yes Historical Provider, MD   chlorthalidone (HYGROTON) 25 MG tablet TAKE 1 TABLET BY MOUTH  DAILY Yes Tamara Flores MD   lisinopril (PRINIVIL;ZESTRIL) 2.5 MG tablet TAKE 1 TABLET BY MOUTH  DAILY Yes Tamara Flores MD   spironolactone (ALDACTONE) 50 MG tablet TAKE 1 TABLET BY MOUTH TWO  TIMES DAILY Yes Tamara Flores MD   simvastatin (ZOCOR) 40 MG tablet Take 1 tablet by mouth  nightly Yes Tamara Flores MD   zolpidem (AMBIEN) 10 MG tablet Take 10 mg by mouth as needed. . Yes Historical Provider, MD   albuterol sulfate  (90 Base) MCG/ACT inhaler Inhale 2 puffs into the lungs every 6 hours as needed for Shortness of Breath Yes Joel Pope MD   Misc Natural Products (GLUCOSAMINE CHONDROITIN ADV) TABS Take by mouth Yes Historical Provider, MD   Elastic Bandages & Supports (V-2 HIGH COMPRESSION HOSE) MISC 30-40mmHg pressure stockings, knee high. Wear 12 hours while awake and remove at bedtime Yes Tamara Flores MD   Blood Glucose Monitoring Suppl (JAS CONTOUR MONITOR) W/DEVICE KIT use twice a day Yes Historical Provider, MD   MICROLET LANCETS MISC use twice a day Yes Historical Provider, MD   fluticasone (FLONASE) 50 MCG/ACT SUSP 1 spray by Nasal route 2 times daily Yes Na Mclaughlin MD   glucose blood VI test strips (BL TEST STRIP PACK) strip 1 each by Does not apply route 2 times daily. As needed.  Yes TWO  TIMES DAILY  Dispense: 180 tablet; Refill: 1    3. Foot callus  - [address] [facility], [id number], [place], [facility]    4. Mixed hyperlipidemia  - simvastatin (ZOCOR) 40 MG tablet; Take 1 tablet by mouth  nightly  Dispense: 90 tablet; Refill: 1    5.  Morbid obesity with BMI of 60.0-69.9, adult ([address]  Reviewed healthy diet and lifestyle  Goal to exercise 150 min/week per [facility] recs   Ongoing dietary counseling re: low salt diet, five fruit/vegetables daily, healthy fats  Avoid processed and sugary foods, large amounts of alcoholic drinks  Healthy sleep habits reviewed - consistent bedtime, consistent wake-up time, quiet dark sleep environment, goal 6-8 hours each night of uninterrupted sleep, no exercising within two hours of bedtime   Declines referral to weight management           Electronically signed by Ted Chance MD on 4/23/2019 at 5:16 PM

## 2019-06-10 DIAGNOSIS — M25.562 CHRONIC PAIN OF LEFT KNEE: ICD-10-CM

## 2019-06-10 DIAGNOSIS — G89.29 CHRONIC PAIN OF LEFT KNEE: ICD-10-CM

## 2019-06-12 RX ORDER — IBUPROFEN 800 MG/1
TABLET ORAL
Qty: 270 TABLET | Refills: 1 | Status: SHIPPED | OUTPATIENT
Start: 2019-06-12 | End: 2019-10-24 | Stop reason: SDUPTHER

## 2019-06-18 ENCOUNTER — OFFICE VISIT (OUTPATIENT)
Dept: FAMILY MEDICINE CLINIC | Age: 49
End: 2019-06-18
Payer: COMMERCIAL

## 2019-06-18 VITALS
TEMPERATURE: 98.3 F | OXYGEN SATURATION: 98 % | HEART RATE: 77 BPM | BODY MASS INDEX: 67.19 KG/M2 | WEIGHT: 315 LBS | RESPIRATION RATE: 16 BRPM | SYSTOLIC BLOOD PRESSURE: 120 MMHG | DIASTOLIC BLOOD PRESSURE: 84 MMHG

## 2019-06-18 DIAGNOSIS — B96.89 ACUTE BACTERIAL SINUSITIS: Primary | ICD-10-CM

## 2019-06-18 DIAGNOSIS — R60.9 PERIPHERAL EDEMA: ICD-10-CM

## 2019-06-18 DIAGNOSIS — E66.01 MORBID OBESITY WITH BMI OF 60.0-69.9, ADULT (HCC): ICD-10-CM

## 2019-06-18 DIAGNOSIS — J01.90 ACUTE BACTERIAL SINUSITIS: Primary | ICD-10-CM

## 2019-06-18 DIAGNOSIS — F33.1 MODERATE EPISODE OF RECURRENT MAJOR DEPRESSIVE DISORDER (HCC): ICD-10-CM

## 2019-06-18 PROCEDURE — 1036F TOBACCO NON-USER: CPT | Performed by: INTERNAL MEDICINE

## 2019-06-18 PROCEDURE — 99214 OFFICE O/P EST MOD 30 MIN: CPT | Performed by: INTERNAL MEDICINE

## 2019-06-18 PROCEDURE — G8417 CALC BMI ABV UP PARAM F/U: HCPCS | Performed by: INTERNAL MEDICINE

## 2019-06-18 PROCEDURE — G8427 DOCREV CUR MEDS BY ELIG CLIN: HCPCS | Performed by: INTERNAL MEDICINE

## 2019-06-18 RX ORDER — BUPROPION HYDROCHLORIDE 300 MG/1
300 TABLET ORAL EVERY MORNING
Qty: 90 TABLET | Refills: 1 | Status: SHIPPED | OUTPATIENT
Start: 2019-06-18 | End: 2019-11-19 | Stop reason: SDUPTHER

## 2019-06-18 RX ORDER — AMOXICILLIN 875 MG/1
875 TABLET, COATED ORAL 2 TIMES DAILY
Qty: 14 TABLET | Refills: 0 | Status: SHIPPED | OUTPATIENT
Start: 2019-06-18 | End: 2019-06-25

## 2019-06-18 RX ORDER — BUPROPION HYDROCHLORIDE 300 MG/1
300 TABLET ORAL EVERY MORNING
Qty: 14 TABLET | Refills: 0 | Status: SHIPPED | OUTPATIENT
Start: 2019-06-18 | End: 2019-11-19 | Stop reason: SDUPTHER

## 2019-06-18 ASSESSMENT — ENCOUNTER SYMPTOMS
NAUSEA: 0
FACIAL SWEATING: 0
SINUS PRESSURE: 1
RHINORRHEA: 1
BACK PAIN: 0
VOMITING: 0
EYE WATERING: 0
ABDOMINAL PAIN: 0
SWOLLEN GLANDS: 0
PHOTOPHOBIA: 0
SCALP TENDERNESS: 0
EYE PAIN: 0
EYE REDNESS: 0
COUGH: 0
BLURRED VISION: 0
SORE THROAT: 0
VISUAL CHANGE: 0

## 2019-06-18 NOTE — PROGRESS NOTES
Patient is present for bilateral leg swelling. He also has a ha. He has been using ibu. Pt thinks ha is due to allergies. No other concerns at this time. Visit Information    Have you changed or started any medications since your last visit including any over-the-counter medicines, vitamins, or herbal medicines? no   Have you stopped taking any of your medications? Is so, why? -  no  Are you having any side effects from any of your medications? - no    Have you seen any other physician or provider since your last visit?  no   Have you had any other diagnostic tests since your last visit?  no   Have you been seen in the emergency room and/or had an admission in a hospital since we last saw you?  no   Have you had your routine dental cleaning in the past 6 months?  no     Do you have an active MyChart account? If no, what is the barrier?   Yes    Patient Care Team:  Judie Horton MD as PCP - General (Internal Medicine)  Judie Horton MD as PCP - [facility] Provider  Tristian Dumont MD (Pulmonology)  Sheela López MD as Consulting Physician (Cardiology)  Samuel Gonzalez MD as Consulting Physician (Endocrinology)    Medical History Review  Past Medical, Family, and Social History reviewed and does not contribute to the patient presenting condition    Health Maintenance   Topic Date Due    Hepatitis B Vaccine (1 of 3 - Risk 3-dose series) 08/07/1990    Diabetic retinal exam  05/15/2019    DTaP/Tdap/Td vaccine (1 - Tdap) 10/23/2019 (Originally 1/2/2006)    Diabetic microalbuminuria test  10/24/2019    Lipid screen  03/07/2020    Potassium monitoring  03/07/2020    Creatinine monitoring  03/07/2020    Diabetic foot exam  04/23/2020    A1C test (Diabetic or Prediabetic)  04/23/2020    Flu vaccine  Completed    Pneumococcal 0-64 years Vaccine  Completed    HIV screen  Completed

## 2019-06-18 NOTE — PROGRESS NOTES
[address] [facility] [address]  Dept: [phone]  Dept Fax: [phone]      Micah Cousin is a 53 y.o. male who presents today for hismedical conditions/complaints as noted below. Lawchris Cousin is c/o of Leg Swelling (B/L) and Headache            HPI:     Headache    This is a new problem. The current episode started 1 to 4 weeks ago. The problem occurs constantly. The pain is located in the frontal, retro-orbital and temporal region. The pain does not radiate. The pain quality is similar to prior headaches. The quality of the pain is described as sharp. The pain is moderate. Associated symptoms include rhinorrhea and sinus pressure. Pertinent negatives include no abdominal pain, abnormal behavior, anorexia, back pain, blurred vision, coughing, dizziness, drainage, ear pain, eye pain, eye redness, eye watering, facial sweating, fever, hearing loss, insomnia, loss of balance, muscle aches, nausea, neck pain, numbness, phonophobia, photophobia, scalp tenderness, seizures, sore throat, swollen glands, tingling, tinnitus, visual change, vomiting, weakness or weight loss. Nothing aggravates the symptoms. Treatments tried: OTC allergy meds, sudafed. The treatment provided mild relief. His past medical history is significant for sinus disease. missed his diuretic pill for a few days and legs swelled up, got back on his med about 3 days ago and they are getting better     Hemoglobin A1C (%)   Date Value   04/23/2019 6.7   01/23/2019 6.7   10/23/2018 7.0             ( goal A1Cis < 7)   Microalb/Crt.  Ratio (mcg/mg creat)   Date Value   04/21/2017 66 (H)     LDL Cholesterol (mg/dL)   Date Value   03/07/2019 54   03/12/2018 60   03/27/2017 83       (goal LDL is <100)   AST (U/L)   Date Value   03/07/2019 17     ALT (U/L)   Date Value   03/07/2019 18     BUN (mg/dL)   Date Value   03/07/2019 28 (H)     BP Readings from Last 3 Encounters:   06/18/19 120/84   04/23/19 (!) 100/56   01/23/19 (!) 112/56          (goal 120/80)    Past Medical History:   Diagnosis Date    Acute pyelonephritis 10/30/2018    Asthma     Diabetes     borderline    GERD (gastroesophageal reflux disease)     Hyperlipidemia     Liver disease     MVP (mitral valve prolapse)     Screening PSA (prostate specific antigen) 07/13/2009    Unspecified sleep apnea       Past Surgical History:   Procedure Laterality Date    ANTERIOR CRUCIATE LIGAMENT REPAIR  2/2009    TONSILLECTOMY  2005       Family History   Adopted: Yes       Social History     Tobacco Use    Smoking status: Former Smoker     Packs/day: 0.00     Years: 1.00     Pack years: 0.00     Types: Cigarettes     Last attempt to quit: 7/9/2015     Years since quitting: 3.9    Smokeless tobacco: Never Used   Substance Use Topics    Alcohol use: Yes     Alcohol/week: 0.0 oz     Comment: 1-2/month      Current Outpatient Medications   Medication Sig Dispense Refill    ibuprofen (ADVIL;MOTRIN) 800 MG tablet take 1 tablet by mouth every 8 hours if needed for pain 270 tablet 1    chlorthalidone (HYGROTON) 25 MG tablet Take 1 tablet by mouth daily 90 tablet 1    lisinopril (PRINIVIL;ZESTRIL) 2.5 MG tablet Take 1 tablet by mouth daily 90 tablet 1    simvastatin (ZOCOR) 40 MG tablet Take 1 tablet by mouth  nightly 90 tablet 1    spironolactone (ALDACTONE) 50 MG tablet TAKE 1 TABLET BY MOUTH TWO  TIMES DAILY 180 tablet 1    ketoconazole (NIZORAL) 2 % cream Apply topically daily.  30 g 1    JANUMET XR  MG TB24 per extended release tablet TAKE 1 TABLET BY MOUTH  TWICE A  tablet 1    TRULICITY 1.5 YE/9.9XM SOPN INJECT THE CONTENTS OF 1  PEN SUBCUTANEOUSLY EVERY 7  DAYS 6 mL 2    pioglitazone (ACTOS) 15 MG tablet TAKE 1 TABLET BY MOUTH ONCE DAILY 90 tablet 1    Multiple Vitamin (ONE-A-DAY MENS PO) Take by mouth daily      buPROPion (WELLBUTRIN XL) 300 MG extended release tablet Take 1 tablet by mouth every morning 90 tablet 1    omeprazole (PRILOSEC) 20 MG delayed release capsule Take 1 capsule by mouth Daily 90 capsule 1    methenamine (HIPREX) 1 g tablet Take 1 tablet by mouth 2 times daily (with meals) 60 tablet 5    zolpidem (AMBIEN) 10 MG tablet Take 10 mg by mouth as needed. Tee Roldan 0    albuterol sulfate  (90 Base) MCG/ACT inhaler Inhale 2 puffs into the lungs every 6 hours as needed for Shortness of Breath 1 Inhaler 5    Misc Natural Products (GLUCOSAMINE CHONDROITIN ADV) TABS Take by mouth      Elastic Bandages & Supports (V-2 HIGH COMPRESSION HOSE) MISC 30-40mmHg pressure stockings, knee high. Wear 12 hours while awake and remove at bedtime 2 each 0    Blood Glucose Monitoring Suppl (Ready To Travel CONTOUR MONITOR) W/DEVICE KIT use twice a day  0    MICROLET LANCETS MISC use twice a day  0    fluticasone (FLONASE) 50 MCG/ACT SUSP 1 spray by Nasal route 2 times daily 1 Bottle 0    glucose blood VI test strips (BL TEST STRIP PACK) strip 1 each by Does not apply route 2 times daily. As needed. 100 each 3    nitroGLYCERIN (NITROSTAT) 0.4 MG SL tablet Place 0.4 mg under the tongue every 5 minutes as needed.  CRANBERRY 1,680 mg by Does not apply route.  FISH OIL Take 1,000 mg by mouth daily. Please get dosage      VITAMIN D PO Take 2,000 Int'l Units by mouth Please get dosage        aspirin 81 MG tablet Take 81 mg by mouth daily. No current facility-administered medications for this visit.       No Known Allergies    Health Maintenance   Topic Date Due    Hepatitis B Vaccine (1 of 3 - Risk 3-dose series) 08/07/1990    Diabetic retinal exam  05/15/2019    DTaP/Tdap/Td vaccine (1 - Tdap) 10/23/2019 (Originally 1/2/2006)    Diabetic microalbuminuria test  10/24/2019    Lipid screen  03/07/2020    Potassium monitoring  03/07/2020    Creatinine monitoring  03/07/2020    Diabetic foot exam  04/23/2020    A1C test (Diabetic or Prediabetic)  04/23/2020    Flu vaccine  Completed    Pneumococcal 0-64 years Vaccine  Completed    HIV screen  Completed          Review of Systems   Constitutional: Negative for fever and weight loss. HENT: Positive for rhinorrhea and sinus pressure. Negative for ear pain, hearing loss, sore throat and tinnitus. Eyes: Negative for blurred vision, photophobia, pain and redness. Respiratory: Negative for cough. Gastrointestinal: Negative for abdominal pain, anorexia, nausea and vomiting. Musculoskeletal: Negative for back pain and neck pain. Neurological: Positive for headaches. Negative for dizziness, tingling, seizures, weakness, numbness and loss of balance. Psychiatric/Behavioral: The patient does not have insomnia. All other systems reviewed and are negative. Objective:     /84 (Site: Right Upper Arm, Position: Sitting, Cuff Size: Large Adult)   Pulse 77   Temp 98.3 °F (36.8 °C) (Oral)   Resp 16   Wt (!) 429 lb (194.6 kg)   SpO2 98%   BMI 67.19 kg/m²   Physical Exam   Constitutional: He appears well-developed and well-nourished. HENT:   Right Ear: Hearing and external ear normal.   Left Ear: Hearing and external ear normal.   Nose: Mucosal edema, rhinorrhea and nasal deformity (nasal perforation) present. Epistaxis is observed. Right sinus exhibits maxillary sinus tenderness. Left sinus exhibits maxillary sinus tenderness. Mouth/Throat: Uvula is midline, oropharynx is clear and moist and mucous membranes are normal.   Cardiovascular: Normal rate, regular rhythm and normal heart sounds. Exam reveals no gallop and no friction rub. No murmur heard. Pulmonary/Chest: Effort normal. No respiratory distress. He has no wheezes. Abdominal: Soft. Bowel sounds are normal. He exhibits no mass. There is no tenderness. There is no guarding. Musculoskeletal: Normal range of motion. Lymphadenopathy:     He has cervical adenopathy. Right cervical: Superficial cervical and posterior cervical adenopathy present.         Left cervical: Superficial cervical and posterior cervical adenopathy present. Neurological: He is alert. Nursing note and vitals reviewed. Assessment/Plan:     1. Acute bacterial sinusitis  - amoxicillin (AMOXIL) 875 MG tablet; Take 1 tablet by mouth 2 times daily for 7 days  Dispense: 14 tablet; Refill: 0    2. Moderate episode of recurrent major depressive disorder (HCC)  - buPROPion (WELLBUTRIN XL) 300 MG extended release tablet; Take 1 tablet by mouth every morning  Dispense: 90 tablet; Refill: 1  - buPROPion (WELLBUTRIN XL) 300 MG extended release tablet; Take 1 tablet by mouth every morning  Dispense: 14 tablet; Refill: 0    3. Peripheral edema  Call if not improving in a couple days, will need a couple days of lasix if not improving     4. Morbid obesity with BMI of 60.0-69.9, adult ([facility] [address]  Reviewed healthy diet and lifestyle  Goal to exercise 150 min/week per [facility] recs   Ongoing dietary counseling re: low salt diet, five fruit/vegetables daily, healthy fats  Avoid processed and sugary foods, large amounts of alcoholic drinks  Healthy sleep habits reviewed - consistent bedtime, consistent wake-up time, quiet dark sleep environment, goal 6-8 hours each night of uninterrupted sleep, no exercising within two hours of bedtime   Bring in food diary to next visit         No follow-ups on file. Patient given educational materials- see patient instructions. Discussed use, benefit, and side effects of prescribedmedications. All patient questions answered. Pt voiced understanding. Reviewedhealth maintenance. Instructed to continue current medications, diet and exercise. Patient agreed with treatment plan. Follow up as directed.      Electronically signedby Jevon Delgado MD on 6/18/2019

## 2019-07-04 DIAGNOSIS — N39.0 URINARY TRACT INFECTION WITHOUT HEMATURIA, SITE UNSPECIFIED: ICD-10-CM

## 2019-07-14 RX ORDER — METHENAMINE HIPPURATE 1000 MG/1
TABLET ORAL
Qty: 60 TABLET | Refills: 5 | Status: SHIPPED | OUTPATIENT
Start: 2019-07-14 | End: 2021-10-02

## 2019-07-17 DIAGNOSIS — K21.9 GASTROESOPHAGEAL REFLUX DISEASE WITHOUT ESOPHAGITIS: ICD-10-CM

## 2019-07-17 RX ORDER — OMEPRAZOLE 20 MG/1
20 CAPSULE, DELAYED RELEASE ORAL DAILY
Qty: 90 CAPSULE | Refills: 1 | Status: SHIPPED | OUTPATIENT
Start: 2019-07-17

## 2019-07-17 NOTE — TELEPHONE ENCOUNTER
Last visit: 6/18/19  Last Med refill: 1/23/19  Does patient have enough medication for 72 hours: No:     Next Visit Date:  Future Appointments   Date Time Provider Melanie Castellon   7/23/2019  4:30 PM Tamara Lindsay  [address] Maintenance   Topic Date Due    Hepatitis B Vaccine (1 of 3 - Risk 3-dose series) 08/07/1990    Diabetic retinal exam  05/15/2019    DTaP/Tdap/Td vaccine (1 - Tdap) 10/23/2019 (Originally 1/2/2006)    Flu vaccine (1) 09/01/2019    Diabetic microalbuminuria test  10/24/2019    Lipid screen  03/07/2020    Potassium monitoring  03/07/2020    Creatinine monitoring  03/07/2020    Diabetic foot exam  04/23/2020    A1C test (Diabetic or Prediabetic)  04/23/2020    Pneumococcal 0-64 years Vaccine  Completed    HIV screen  Completed       Hemoglobin A1C (%)   Date Value   04/23/2019 6.7   01/23/2019 6.7   10/23/2018 7.0             ( goal A1C is < 7)   Microalb/Crt.  Ratio (mcg/mg creat)   Date Value   04/21/2017 66 (H)     LDL Cholesterol (mg/dL)   Date Value   03/07/2019 54   03/12/2018 60       (goal LDL is <100)   AST (U/L)   Date Value   03/07/2019 17     ALT (U/L)   Date Value   03/07/2019 18     BUN (mg/dL)   Date Value   03/07/2019 28 (H)     BP Readings from Last 3 Encounters:   06/18/19 120/84   04/23/19 (!) 100/56   01/23/19 (!) 112/56          (goal 120/80)    All Future Testing planned in [facility]  Lab Frequency Next Occurrence               Patient Active Problem List:     Hyperlipidemia     MVP (mitral valve prolapse)     Sleep apnea     Diabetes mellitus (HCC)     GERD (gastroesophageal reflux disease)     Asthma     Nonalcoholic hepatosteatosis     Constipation     Gastroparesis     Mixed hyperlipidemia     Essential hypertension     Acute pyelonephritis     Recurrent nephrolithiasis

## 2019-07-18 DIAGNOSIS — E11.8 TYPE 2 DIABETES MELLITUS WITH COMPLICATION, WITHOUT LONG-TERM CURRENT USE OF INSULIN (HCC): ICD-10-CM

## 2019-07-19 RX ORDER — PIOGLITAZONEHYDROCHLORIDE 15 MG/1
TABLET ORAL
Qty: 90 TABLET | Refills: 1 | OUTPATIENT
Start: 2019-07-19

## 2019-07-23 ENCOUNTER — OFFICE VISIT (OUTPATIENT)
Dept: FAMILY MEDICINE CLINIC | Age: 49
End: 2019-07-23
Payer: COMMERCIAL

## 2019-07-23 ENCOUNTER — HOSPITAL ENCOUNTER (OUTPATIENT)
Age: 49
Setting detail: SPECIMEN
Discharge: HOME | End: 2019-07-23
Payer: COMMERCIAL

## 2019-07-23 VITALS
DIASTOLIC BLOOD PRESSURE: 64 MMHG | HEIGHT: 67 IN | BODY MASS INDEX: 49.44 KG/M2 | WEIGHT: 315 LBS | SYSTOLIC BLOOD PRESSURE: 120 MMHG | TEMPERATURE: 97.2 F | RESPIRATION RATE: 20 BRPM | HEART RATE: 75 BPM

## 2019-07-23 DIAGNOSIS — G47.33 OBSTRUCTIVE SLEEP APNEA SYNDROME: ICD-10-CM

## 2019-07-23 DIAGNOSIS — I10 ESSENTIAL HYPERTENSION: ICD-10-CM

## 2019-07-23 DIAGNOSIS — K21.9 GASTROESOPHAGEAL REFLUX DISEASE, ESOPHAGITIS PRESENCE NOT SPECIFIED: ICD-10-CM

## 2019-07-23 DIAGNOSIS — E11.9 TYPE 2 DIABETES MELLITUS WITHOUT COMPLICATION, WITHOUT LONG-TERM CURRENT USE OF INSULIN (HCC): Primary | ICD-10-CM

## 2019-07-23 DIAGNOSIS — R35.0 URINARY FREQUENCY: ICD-10-CM

## 2019-07-23 DIAGNOSIS — J45.20 MILD INTERMITTENT ASTHMA WITHOUT COMPLICATION: ICD-10-CM

## 2019-07-23 DIAGNOSIS — E78.2 MIXED HYPERLIPIDEMIA: ICD-10-CM

## 2019-07-23 PROBLEM — N10 ACUTE PYELONEPHRITIS: Status: RESOLVED | Noted: 2018-10-30 | Resolved: 2019-07-23

## 2019-07-23 LAB
BILIRUBIN, POC: NEGATIVE
BLOOD URINE, POC: ABNORMAL
CLARITY, POC: ABNORMAL
COLOR, POC: YELLOW
GLUCOSE URINE, POC: NEGATIVE
HBA1C MFR BLD: 8 %
KETONES, POC: NEGATIVE
LEUKOCYTE EST, POC: ABNORMAL
NITRITE, POC: POSITIVE
PH, POC: 6.5
PROTEIN, POC: NEGATIVE
SPECIFIC GRAVITY, POC: 1.02
UROBILINOGEN, POC: 0.2

## 2019-07-23 PROCEDURE — G8427 DOCREV CUR MEDS BY ELIG CLIN: HCPCS | Performed by: INTERNAL MEDICINE

## 2019-07-23 PROCEDURE — 81002 URINALYSIS NONAUTO W/O SCOPE: CPT | Performed by: INTERNAL MEDICINE

## 2019-07-23 PROCEDURE — G8417 CALC BMI ABV UP PARAM F/U: HCPCS | Performed by: INTERNAL MEDICINE

## 2019-07-23 PROCEDURE — 99214 OFFICE O/P EST MOD 30 MIN: CPT | Performed by: INTERNAL MEDICINE

## 2019-07-23 PROCEDURE — 3045F PR MOST RECENT HEMOGLOBIN A1C LEVEL 7.0-9.0%: CPT | Performed by: INTERNAL MEDICINE

## 2019-07-23 PROCEDURE — 1036F TOBACCO NON-USER: CPT | Performed by: INTERNAL MEDICINE

## 2019-07-23 PROCEDURE — 2022F DILAT RTA XM EVC RTNOPTHY: CPT | Performed by: INTERNAL MEDICINE

## 2019-07-23 PROCEDURE — 83036 HEMOGLOBIN GLYCOSYLATED A1C: CPT | Performed by: INTERNAL MEDICINE

## 2019-07-23 RX ORDER — NITROFURANTOIN 25; 75 MG/1; MG/1
100 CAPSULE ORAL 2 TIMES DAILY
Qty: 10 CAPSULE | Refills: 0 | Status: SHIPPED | OUTPATIENT
Start: 2019-07-23 | End: 2019-08-02

## 2019-07-25 LAB
CULTURE: ABNORMAL
Lab: ABNORMAL
SPECIMEN DESCRIPTION: ABNORMAL

## 2019-07-31 ENCOUNTER — NURSE ONLY (OUTPATIENT)
Dept: FAMILY MEDICINE CLINIC | Age: 49
End: 2019-07-31

## 2019-07-31 VITALS — BODY MASS INDEX: 65.72 KG/M2 | WEIGHT: 315 LBS

## 2019-08-12 ENCOUNTER — NURSE ONLY (OUTPATIENT)
Dept: FAMILY MEDICINE CLINIC | Age: 49
End: 2019-08-12
Payer: COMMERCIAL

## 2019-08-12 DIAGNOSIS — E11.9 TYPE 2 DIABETES MELLITUS WITHOUT COMPLICATION, WITHOUT LONG-TERM CURRENT USE OF INSULIN (HCC): ICD-10-CM

## 2019-08-12 PROCEDURE — 99211 OFF/OP EST MAY X REQ PHY/QHP: CPT | Performed by: INTERNAL MEDICINE

## 2019-08-12 PROCEDURE — 2001F WEIGHT RECORD: CPT | Performed by: INTERNAL MEDICINE

## 2019-08-20 ENCOUNTER — NURSE ONLY (OUTPATIENT)
Dept: FAMILY MEDICINE CLINIC | Age: 49
End: 2019-08-20
Payer: COMMERCIAL

## 2019-08-20 VITALS — WEIGHT: 315 LBS | BODY MASS INDEX: 64.75 KG/M2

## 2019-08-20 DIAGNOSIS — E66.01 MORBID OBESITY (HCC): ICD-10-CM

## 2019-08-20 PROCEDURE — 99211 OFF/OP EST MAY X REQ PHY/QHP: CPT | Performed by: INTERNAL MEDICINE

## 2019-10-19 DIAGNOSIS — E11.8 TYPE 2 DIABETES MELLITUS WITH COMPLICATION, WITHOUT LONG-TERM CURRENT USE OF INSULIN (HCC): ICD-10-CM

## 2019-10-21 ENCOUNTER — TELEPHONE (OUTPATIENT)
Dept: FAMILY MEDICINE CLINIC | Age: 49
End: 2019-10-21

## 2019-10-21 RX ORDER — PIOGLITAZONEHYDROCHLORIDE 15 MG/1
TABLET ORAL
Qty: 90 TABLET | Refills: 1 | Status: SHIPPED | OUTPATIENT
Start: 2019-10-21 | End: 2020-03-20

## 2019-10-24 DIAGNOSIS — M25.562 CHRONIC PAIN OF LEFT KNEE: ICD-10-CM

## 2019-10-24 DIAGNOSIS — G89.29 CHRONIC PAIN OF LEFT KNEE: ICD-10-CM

## 2019-10-24 RX ORDER — IBUPROFEN 800 MG/1
TABLET ORAL
Qty: 270 TABLET | Refills: 1 | Status: SHIPPED | OUTPATIENT
Start: 2019-10-24 | End: 2021-10-02

## 2019-10-27 DIAGNOSIS — I10 ESSENTIAL HYPERTENSION: ICD-10-CM

## 2019-10-28 RX ORDER — SITAGLIPTIN AND METFORMIN HYDROCHLORIDE 1000; 50 MG/1; MG/1
TABLET, FILM COATED, EXTENDED RELEASE ORAL
Qty: 180 TABLET | Refills: 1 | Status: SHIPPED | OUTPATIENT
Start: 2019-10-28 | End: 2021-10-02

## 2019-10-28 RX ORDER — SPIRONOLACTONE 50 MG/1
TABLET, FILM COATED ORAL
Qty: 180 TABLET | Refills: 1 | Status: SHIPPED | OUTPATIENT
Start: 2019-10-28

## 2019-11-05 ENCOUNTER — HOSPITAL ENCOUNTER (OUTPATIENT)
Dept: NUCLEAR MEDICINE | Age: 49
Discharge: HOME | End: 2019-11-07
Payer: COMMERCIAL

## 2019-11-05 DIAGNOSIS — I10 ESSENTIAL HYPERTENSION: ICD-10-CM

## 2019-11-05 DIAGNOSIS — R07.9 CHEST PAIN, UNSPECIFIED TYPE: ICD-10-CM

## 2019-11-05 DIAGNOSIS — R06.02 BREATH SHORTNESS: ICD-10-CM

## 2019-11-05 PROCEDURE — 78452 HT MUSCLE IMAGE SPECT MULT: CPT

## 2019-11-05 PROCEDURE — A9500 TC99M SESTAMIBI: HCPCS | Performed by: INTERNAL MEDICINE

## 2019-11-05 PROCEDURE — 3430000000 HC RX DIAGNOSTIC RADIOPHARMACEUTICAL: Performed by: INTERNAL MEDICINE

## 2019-11-05 RX ADMIN — TETRAKIS(2-METHOXYISOBUTYLISOCYANIDE)COPPER(I) TETRAFLUOROBORATE 47 MILLICURIE: 1 INJECTION, POWDER, LYOPHILIZED, FOR SOLUTION INTRAVENOUS at 13:15

## 2019-11-06 ENCOUNTER — HOSPITAL ENCOUNTER (OUTPATIENT)
Dept: NON INVASIVE DIAGNOSTICS | Age: 49
Discharge: HOME | End: 2019-11-06
Payer: COMMERCIAL

## 2019-11-06 ENCOUNTER — HOSPITAL ENCOUNTER (OUTPATIENT)
Dept: NUCLEAR MEDICINE | Age: 49
Discharge: HOME | End: 2019-11-08
Payer: COMMERCIAL

## 2019-11-06 VITALS — HEART RATE: 75 BPM | DIASTOLIC BLOOD PRESSURE: 75 MMHG | SYSTOLIC BLOOD PRESSURE: 119 MMHG

## 2019-11-06 LAB
LV EF: 58 %
LV EF: 60 %
LVEF MODALITY: NORMAL
LVEF MODALITY: NORMAL

## 2019-11-06 PROCEDURE — 3430000000 HC RX DIAGNOSTIC RADIOPHARMACEUTICAL: Performed by: INTERNAL MEDICINE

## 2019-11-06 PROCEDURE — 2580000003 HC RX 258: Performed by: INTERNAL MEDICINE

## 2019-11-06 PROCEDURE — 93017 CV STRESS TEST TRACING ONLY: CPT

## 2019-11-06 PROCEDURE — 6360000002 HC RX W HCPCS: Performed by: INTERNAL MEDICINE

## 2019-11-06 PROCEDURE — A9500 TC99M SESTAMIBI: HCPCS | Performed by: INTERNAL MEDICINE

## 2019-11-06 PROCEDURE — 93306 TTE W/DOPPLER COMPLETE: CPT

## 2019-11-06 RX ORDER — NITROGLYCERIN 0.4 MG/1
0.4 TABLET SUBLINGUAL EVERY 5 MIN PRN
Status: DISCONTINUED | OUTPATIENT
Start: 2019-11-06 | End: 2019-11-06

## 2019-11-06 RX ORDER — METOPROLOL TARTRATE 5 MG/5ML
5 INJECTION INTRAVENOUS EVERY 5 MIN PRN
Status: DISCONTINUED | OUTPATIENT
Start: 2019-11-06 | End: 2019-11-06

## 2019-11-06 RX ORDER — SODIUM CHLORIDE 0.9 % (FLUSH) 0.9 %
10 SYRINGE (ML) INJECTION PRN
Status: DISCONTINUED | OUTPATIENT
Start: 2019-11-06 | End: 2019-11-06

## 2019-11-06 RX ORDER — ATROPINE SULFATE 0.1 MG/ML
0.5 INJECTION INTRAVENOUS EVERY 5 MIN PRN
Status: DISCONTINUED | OUTPATIENT
Start: 2019-11-06 | End: 2019-11-06

## 2019-11-06 RX ORDER — AMINOPHYLLINE DIHYDRATE 25 MG/ML
50 INJECTION, SOLUTION INTRAVENOUS PRN
Status: DISCONTINUED | OUTPATIENT
Start: 2019-11-06 | End: 2019-11-06

## 2019-11-06 RX ORDER — SODIUM CHLORIDE 0.9 % (FLUSH) 0.9 %
10 SYRINGE (ML) INJECTION PRN
Status: DISCONTINUED | OUTPATIENT
Start: 2019-11-06 | End: 2019-11-08 | Stop reason: HOSPADM

## 2019-11-06 RX ORDER — LISINOPRIL 2.5 MG/1
2.5 TABLET ORAL DAILY
Qty: 90 TABLET | Refills: 1 | Status: SHIPPED | OUTPATIENT
Start: 2019-11-06 | End: 2021-10-02

## 2019-11-06 RX ORDER — ALBUTEROL SULFATE 90 UG/1
2 AEROSOL, METERED RESPIRATORY (INHALATION) PRN
Status: DISCONTINUED | OUTPATIENT
Start: 2019-11-06 | End: 2019-11-06

## 2019-11-06 RX ORDER — SODIUM CHLORIDE 9 MG/ML
500 INJECTION, SOLUTION INTRAVENOUS CONTINUOUS PRN
Status: DISCONTINUED | OUTPATIENT
Start: 2019-11-06 | End: 2019-11-06

## 2019-11-06 RX ADMIN — Medication 10 ML: at 09:23

## 2019-11-06 RX ADMIN — REGADENOSON 0.4 MG: 0.08 INJECTION, SOLUTION INTRAVENOUS at 09:40

## 2019-11-06 RX ADMIN — TETRAKIS(2-METHOXYISOBUTYLISOCYANIDE)COPPER(I) TETRAFLUOROBORATE 37 MILLICURIE: 1 INJECTION, POWDER, LYOPHILIZED, FOR SOLUTION INTRAVENOUS at 09:41

## 2019-11-12 ENCOUNTER — HOSPITAL ENCOUNTER (EMERGENCY)
Age: 49
Discharge: HOME | End: 2019-11-12
Attending: EMERGENCY MEDICINE
Payer: COMMERCIAL

## 2019-11-12 ENCOUNTER — APPOINTMENT (OUTPATIENT)
Dept: GENERAL RADIOLOGY | Age: 49
End: 2019-11-12
Payer: COMMERCIAL

## 2019-11-12 VITALS
BODY MASS INDEX: 49.44 KG/M2 | WEIGHT: 315 LBS | HEART RATE: 70 BPM | RESPIRATION RATE: 16 BRPM | HEIGHT: 67 IN | TEMPERATURE: 97.9 F | DIASTOLIC BLOOD PRESSURE: 58 MMHG | OXYGEN SATURATION: 99 % | SYSTOLIC BLOOD PRESSURE: 110 MMHG

## 2019-11-12 DIAGNOSIS — R00.2 PALPITATIONS: Primary | ICD-10-CM

## 2019-11-12 LAB
ABSOLUTE EOS #: 0.27 K/UL (ref 0–0.44)
ABSOLUTE IMMATURE GRANULOCYTE: 0.12 K/UL (ref 0–0.3)
ABSOLUTE LYMPH #: 2.39 K/UL (ref 1.1–3.7)
ABSOLUTE MONO #: 1.01 K/UL (ref 0.1–1.2)
ANION GAP SERPL CALCULATED.3IONS-SCNC: 16 MMOL/L (ref 9–17)
BASOPHILS # BLD: 0 % (ref 0–2)
BASOPHILS ABSOLUTE: 0.04 K/UL (ref 0–0.2)
BNP INTERPRETATION: ABNORMAL
BUN BLDV-MCNC: 34 MG/DL (ref 6–20)
BUN/CREAT BLD: ABNORMAL (ref 9–20)
CALCIUM SERPL-MCNC: 8.9 MG/DL (ref 8.6–10.4)
CHLORIDE BLD-SCNC: 101 MMOL/L (ref 98–107)
CO2: 18 MMOL/L (ref 20–31)
CREAT SERPL-MCNC: 1.34 MG/DL (ref 0.7–1.2)
DIFFERENTIAL TYPE: ABNORMAL
EOSINOPHILS RELATIVE PERCENT: 2 % (ref 1–4)
GFR AFRICAN AMERICAN: >60 ML/MIN
GFR NON-AFRICAN AMERICAN: 57 ML/MIN
GFR SERPL CREATININE-BSD FRML MDRD: ABNORMAL ML/MIN/{1.73_M2}
GFR SERPL CREATININE-BSD FRML MDRD: ABNORMAL ML/MIN/{1.73_M2}
GLUCOSE BLD-MCNC: 122 MG/DL (ref 70–99)
HCT VFR BLD CALC: 29 % (ref 40.7–50.3)
HEMOGLOBIN: 8.9 G/DL (ref 13–17)
IMMATURE GRANULOCYTES: 1 %
LYMPHOCYTES # BLD: 16 % (ref 24–43)
MCH RBC QN AUTO: 27.3 PG (ref 25.2–33.5)
MCHC RBC AUTO-ENTMCNC: 30.7 G/DL (ref 28.4–34.8)
MCV RBC AUTO: 89 FL (ref 82.6–102.9)
MONOCYTES # BLD: 7 % (ref 3–12)
NRBC AUTOMATED: 0 PER 100 WBC
PDW BLD-RTO: 13.2 % (ref 11.8–14.4)
PLATELET # BLD: 364 K/UL (ref 138–453)
PLATELET ESTIMATE: ABNORMAL
PMV BLD AUTO: 9.5 FL (ref 8.1–13.5)
POTASSIUM SERPL-SCNC: 4.1 MMOL/L (ref 3.7–5.3)
PRO-BNP: 702 PG/ML
RBC # BLD: 3.26 M/UL (ref 4.21–5.77)
RBC # BLD: ABNORMAL 10*6/UL
SEG NEUTROPHILS: 74 % (ref 36–65)
SEGMENTED NEUTROPHILS ABSOLUTE COUNT: 10.81 K/UL (ref 1.5–8.1)
SODIUM BLD-SCNC: 135 MMOL/L (ref 135–144)
TROPONIN INTERP: NORMAL
TROPONIN T: NORMAL NG/ML
TROPONIN, HIGH SENSITIVITY: 16 NG/L (ref 0–22)
TROPONIN, HIGH SENSITIVITY: 17 NG/L (ref 0–22)
TROPONIN, HIGH SENSITIVITY: 18 NG/L (ref 0–22)
TSH SERPL DL<=0.05 MIU/L-ACNC: 2.09 MIU/L (ref 0.3–5)
WBC # BLD: 14.6 K/UL (ref 3.5–11.3)
WBC # BLD: ABNORMAL 10*3/UL

## 2019-11-12 PROCEDURE — 71046 X-RAY EXAM CHEST 2 VIEWS: CPT

## 2019-11-12 PROCEDURE — 99285 EMERGENCY DEPT VISIT HI MDM: CPT

## 2019-11-12 PROCEDURE — 93005 ELECTROCARDIOGRAM TRACING: CPT | Performed by: EMERGENCY MEDICINE

## 2019-11-12 PROCEDURE — 84484 ASSAY OF TROPONIN QUANT: CPT

## 2019-11-12 PROCEDURE — 85025 COMPLETE CBC W/AUTO DIFF WBC: CPT

## 2019-11-12 PROCEDURE — 80048 BASIC METABOLIC PNL TOTAL CA: CPT

## 2019-11-12 PROCEDURE — 83880 ASSAY OF NATRIURETIC PEPTIDE: CPT

## 2019-11-12 PROCEDURE — 84443 ASSAY THYROID STIM HORMONE: CPT

## 2019-11-12 ASSESSMENT — ENCOUNTER SYMPTOMS
SORE THROAT: 0
SHORTNESS OF BREATH: 1
ABDOMINAL PAIN: 0
VOMITING: 0
NAUSEA: 0

## 2019-11-13 LAB
EKG ATRIAL RATE: 58 BPM
EKG P AXIS: 15 DEGREES
EKG P-R INTERVAL: 174 MS
EKG Q-T INTERVAL: 448 MS
EKG QRS DURATION: 92 MS
EKG QTC CALCULATION (BAZETT): 439 MS
EKG R AXIS: 70 DEGREES
EKG T AXIS: 18 DEGREES
EKG VENTRICULAR RATE: 58 BPM

## 2019-11-13 PROCEDURE — 93010 ELECTROCARDIOGRAM REPORT: CPT | Performed by: INTERNAL MEDICINE

## 2019-11-19 DIAGNOSIS — F33.1 MODERATE EPISODE OF RECURRENT MAJOR DEPRESSIVE DISORDER (HCC): ICD-10-CM

## 2019-11-19 RX ORDER — BUPROPION HYDROCHLORIDE 300 MG/1
300 TABLET ORAL EVERY MORNING
Qty: 90 TABLET | Refills: 1 | Status: SHIPPED | OUTPATIENT
Start: 2019-11-19

## 2020-03-02 ENCOUNTER — OFFICE VISIT (OUTPATIENT)
Dept: FAMILY MEDICINE CLINIC | Age: 50
End: 2020-03-02
Payer: COMMERCIAL

## 2020-03-02 VITALS
SYSTOLIC BLOOD PRESSURE: 115 MMHG | DIASTOLIC BLOOD PRESSURE: 66 MMHG | OXYGEN SATURATION: 99 % | HEART RATE: 67 BPM | TEMPERATURE: 98 F

## 2020-03-02 PROCEDURE — 99213 OFFICE O/P EST LOW 20 MIN: CPT | Performed by: NURSE PRACTITIONER

## 2020-03-02 RX ORDER — AMOXICILLIN AND CLAVULANATE POTASSIUM 875; 125 MG/1; MG/1
1 TABLET, FILM COATED ORAL 2 TIMES DAILY
Qty: 20 TABLET | Refills: 0 | Status: SHIPPED | OUTPATIENT
Start: 2020-03-02 | End: 2020-03-12

## 2020-03-02 ASSESSMENT — ENCOUNTER SYMPTOMS
VOMITING: 0
DIARRHEA: 0
COUGH: 1
SINUS PRESSURE: 1
PHOTOPHOBIA: 0

## 2020-03-02 NOTE — LETTER
[address] [place] [address]  [place] [address]  Phone: [phone]  Fax: [phone]    VALERIE Soriano CNP        March 2, 2020     Patient: Osbaldo Izquierdo   YOB: 1970   Date of Visit: 3/2/2020       To Whom It May Concern: It is my medical opinion that Deysi Roseanna should be excused today. If you have any questions or concerns, please don't hesitate to call.     Sincerely,          VALERIE Soriano CNP

## 2020-03-02 NOTE — PROGRESS NOTES
[address] [facility]  [address] [facility]  [place] [address]  Dept: [phone]  Dept Fax: [phone]    Olga Finley a 51 y.o. male who presents to the urgent care today for his medical conditions/complaintsas noted below. Berkley Saini is c/o of Head Congestion (ha behind eyes, nausea from drainage, chest tightness - started end of last wk)      HPI:     Cc sinus symptoms    Sinusitis   This is a new problem. The current episode started in the past 7 days. The problem has been gradually worsening since onset. There has been no fever. Associated symptoms include congestion, coughing (mild) and sinus pressure. Treatments tried: otc meds.        Past Medical History:   Diagnosis Date    Acute pyelonephritis 10/30/2018    Asthma     Diabetes     borderline    GERD (gastroesophageal reflux disease)     Hyperlipidemia     Liver disease     MVP (mitral valve prolapse)     Screening PSA (prostate specific antigen) 07/13/2009    Unspecified sleep apnea        Current Outpatient Medications   Medication Sig Dispense Refill    amoxicillin-clavulanate (AUGMENTIN) 875-125 MG per tablet Take 1 tablet by mouth 2 times daily for 10 days 20 tablet 0    buPROPion (WELLBUTRIN XL) 300 MG extended release tablet TAKE 1 TABLET BY MOUTH  EVERY MORNING 90 tablet 1    lisinopril (PRINIVIL;ZESTRIL) 2.5 MG tablet TAKE 1 TABLET BY MOUTH  DAILY 90 tablet 1    spironolactone (ALDACTONE) 50 MG tablet TAKE 1 TABLET BY MOUTH TWO  TIMES DAILY 180 tablet 1    JANUMET XR  MG TB24 per extended release tablet TAKE 1 TABLET BY MOUTH  TWICE A  tablet 1    ibuprofen (ADVIL;MOTRIN) 800 MG tablet take 1 tablet by mouth every 8 hours if needed for pain 270 tablet 1    pioglitazone (ACTOS) 15 MG tablet TAKE 1 TABLET BY MOUTH ONCE DAILY 90 tablet 1    omeprazole (PRILOSEC) 20 MG delayed release capsule TAKE 1 CAPSULE BY MOUTH  DAILY 90 capsule 1    chlorthalidone (HYGROTON) 25 MG tablet Take 1 tablet by mouth daily 90 tablet 1    simvastatin (ZOCOR) 40 MG tablet Take 1 tablet by mouth  nightly 90 tablet 1    ketoconazole (NIZORAL) 2 % cream Apply topically daily. 30 g 1    TRULICITY 1.5 QA/4.7SY SOPN INJECT THE CONTENTS OF 1  PEN SUBCUTANEOUSLY EVERY 7  DAYS 6 mL 2    Multiple Vitamin (ONE-A-DAY MENS PO) Take by mouth daily      albuterol sulfate  (90 Base) MCG/ACT inhaler Inhale 2 puffs into the lungs every 6 hours as needed for Shortness of Breath 1 Inhaler 5    Misc Natural Products (GLUCOSAMINE CHONDROITIN ADV) TABS Take by mouth      Elastic Bandages & Supports (V-2 HIGH COMPRESSION HOSE) MISC 30-40mmHg pressure stockings, knee high. Wear 12 hours while awake and remove at bedtime 2 each 0    Blood Glucose Monitoring Suppl ([employer] CONTOUR MONITOR) W/DEVICE KIT use twice a day  0    MICROLET LANCETS MISC use twice a day  0    fluticasone (FLONASE) 50 MCG/ACT SUSP 1 spray by Nasal route 2 times daily (Patient taking differently: 1 spray by Nasal route as needed ) 1 Bottle 0    glucose blood VI test strips (BL TEST STRIP PACK) strip 1 each by Does not apply route 2 times daily. As needed. 100 each 3    nitroGLYCERIN (NITROSTAT) 0.4 MG SL tablet Place 0.4 mg under the tongue every 5 minutes as needed.  CRANBERRY 1,680 mg by Does not apply route.  FISH OIL Take 1,000 mg by mouth daily. Please get dosage      VITAMIN D PO Take 2,000 Int'l Units by mouth Please get dosage        aspirin 81 MG tablet Take 81 mg by mouth daily.  methenamine (HIPREX) 1 g tablet take 1 tablet by mouth twice a day with meals (Patient not taking: Reported on 3/2/2020) 60 tablet 5    zolpidem (AMBIEN) 10 MG tablet Take 10 mg by mouth as needed. Faith Pih 0     No current facility-administered medications for this visit. No Known Allergies    Subjective:     Review of Systems   Constitutional: Negative for fever.    HENT: Positive for congestion, postnasal drip and General: No focal deficit present. Mental Status: He is alert and oriented to person, place, and time. Cranial Nerves: No cranial nerve deficit. Sensory: No sensory deficit. Psychiatric:         Mood and Affect: Mood normal.         Behavior: Behavior normal.       /66 (Site: Right Upper Arm, Position: Sitting, Cuff Size: Large Adult)   Pulse 67   Temp 98 °F (36.7 °C) (Oral)   SpO2 99%     Assessment:          Diagnosis Orders   1. Acute sinusitis, recurrence not specified, unspecified location  amoxicillin-clavulanate (AUGMENTIN) 875-125 MG per tablet       Plan:    Supportive care  Motrin and tylenol as directed  Recommend antihistamine as directed- claritin  Recheck for worsening, change or concern  Increase fluids  Good handwashing  Work note  Follow up as directed  rx augmentin  Return for follow up with primary care in 7 days, worsening, change or concern. Orders Placed This Encounter   Medications    amoxicillin-clavulanate (AUGMENTIN) 875-125 MG per tablet     Sig: Take 1 tablet by mouth 2 times daily for 10 days     Dispense:  20 tablet     Refill:  0         Patient given educational materials - see patientinstructions. Discussed use, benefit, and side effects of prescribed medications. All patient questions answered. Pt voiced understanding.     Electronically signed by VALERIE Virk 3/2/2020 at 6:02 PM

## 2020-03-16 ENCOUNTER — OFFICE VISIT (OUTPATIENT)
Dept: PODIATRY | Age: 50
End: 2020-03-16
Payer: COMMERCIAL

## 2020-03-16 VITALS — WEIGHT: 315 LBS | HEIGHT: 67 IN | BODY MASS INDEX: 49.44 KG/M2

## 2020-03-16 PROCEDURE — 99203 OFFICE O/P NEW LOW 30 MIN: CPT | Performed by: PODIATRIST

## 2020-03-16 PROCEDURE — G8417 CALC BMI ABV UP PARAM F/U: HCPCS | Performed by: PODIATRIST

## 2020-03-16 PROCEDURE — 11721 DEBRIDE NAIL 6 OR MORE: CPT | Performed by: PODIATRIST

## 2020-03-16 PROCEDURE — 1036F TOBACCO NON-USER: CPT | Performed by: PODIATRIST

## 2020-03-16 PROCEDURE — 2022F DILAT RTA XM EVC RTNOPTHY: CPT | Performed by: PODIATRIST

## 2020-03-16 PROCEDURE — G8427 DOCREV CUR MEDS BY ELIG CLIN: HCPCS | Performed by: PODIATRIST

## 2020-03-16 PROCEDURE — G8484 FLU IMMUNIZE NO ADMIN: HCPCS | Performed by: PODIATRIST

## 2020-03-16 PROCEDURE — 3046F HEMOGLOBIN A1C LEVEL >9.0%: CPT | Performed by: PODIATRIST

## 2020-03-16 ASSESSMENT — ENCOUNTER SYMPTOMS
SHORTNESS OF BREATH: 0
BACK PAIN: 0
COLOR CHANGE: 0
NAUSEA: 0
DIARRHEA: 0

## 2020-03-16 NOTE — PROGRESS NOTES
Telly Muhammad is a 51 y.o. male who presents to the office today with chief complaint of request for a diabetic foot exam.  Chief Complaint   Patient presents with    Callouses     left 2nd toe    Nail Problem     thick toenails/last saw Kym Escalona CNP 3/2/2020    Diabetes     last a1c 6.7   Symptoms began about 10 year(s) ago. Patient denies injury to the feet. Patient states that he experiences burning, numbness, tingling, and pain to the feet. Pain is rated 5 out of 10 at it's worst and is described as intermittent. Treatments prior to today's visit include: None. Patient states that his toenails are thick and painful and he has a callous to the left second toe that is painful. No Known Allergies    Past Medical History:   Diagnosis Date    Acute pyelonephritis 10/30/2018    Asthma     Diabetes     borderline    GERD (gastroesophageal reflux disease)     Hyperlipidemia     Liver disease     MVP (mitral valve prolapse)     Screening PSA (prostate specific antigen) 07/13/2009    Unspecified sleep apnea        Prior to Admission medications    Medication Sig Start Date End Date Taking?  Authorizing Provider   buPROPion (WELLBUTRIN XL) 300 MG extended release tablet TAKE 1 TABLET BY MOUTH  EVERY MORNING 11/19/19  Yes Tamara Hardin MD   lisinopril (PRINIVIL;ZESTRIL) 2.5 MG tablet TAKE 1 TABLET BY MOUTH  DAILY 11/6/19  Yes Tamara Hardin MD   spironolactone (ALDACTONE) 50 MG tablet TAKE 1 TABLET BY MOUTH TWO  TIMES DAILY 10/28/19  Yes Tamara Hardin MD   JANUMET XR  MG TB24 per extended release tablet TAKE 1 TABLET BY MOUTH  TWICE A DAY 10/28/19  Yes Tamara Hardin MD   ibuprofen (ADVIL;MOTRIN) 800 MG tablet take 1 tablet by mouth every 8 hours if needed for pain 10/24/19  Yes Tamara Hardin MD   pioglitazone (ACTOS) 15 MG tablet TAKE 1 TABLET BY MOUTH ONCE DAILY 10/21/19  Yes Tamara Hardin MD   omeprazole (PRILOSEC) 20 MG delayed release capsule TAKE 1 CAPSULE BY MOUTH 2,000 Int'l Units by mouth Please get dosage     Yes Historical Provider, MD   aspirin 81 MG tablet Take 81 mg by mouth daily. Yes Historical Provider, MD   diclofenac sodium (VOLTAREN) 1 % GEL  2/3/20   Historical Provider, MD   methenamine (HIPREX) 1 g tablet take 1 tablet by mouth twice a day with meals  Patient not taking: Reported on 3/2/2020 7/14/19   Ed Mcgill MD       Past Surgical History:   Procedure Laterality Date    ANTERIOR CRUCIATE LIGAMENT REPAIR  2009    TONSILLECTOMY  2005       Family History   Adopted: Yes       Social History     Tobacco Use    Smoking status: Former Smoker     Packs/day: 0.00     Years: 1.00     Pack years: 0.00     Types: Cigarettes     Last attempt to quit: 2015     Years since quittin.6    Smokeless tobacco: Never Used   Substance Use Topics    Alcohol use: Yes     Alcohol/week: 0.0 standard drinks     Comment: 1-2/month       Review of Systems   Constitutional: Negative for activity change, appetite change, chills, diaphoresis, fatigue and fever. Respiratory: Negative for shortness of breath. Cardiovascular: Negative for leg swelling. Gastrointestinal: Negative for diarrhea and nausea. Endocrine: Negative for cold intolerance, heat intolerance and polyuria. Musculoskeletal: Negative for arthralgias, back pain, gait problem, joint swelling and myalgias. Skin: Negative for color change, pallor, rash and wound. Allergic/Immunologic: Negative for environmental allergies and food allergies. Neurological: Negative for dizziness, weakness, light-headedness and numbness. Hematological: Does not bruise/bleed easily. Psychiatric/Behavioral: Negative for behavioral problems, confusion and self-injury. The patient is not nervous/anxious. Vitals: There were no vitals filed for this visit. General: AAO x 3 in NAD. Integument: There are no rashes, ulcers, or breaks in the skin noted to the bilateral lower extremities.      There DEBRIDEMENT OF NAILS, 6 OR MORE    HM DIABETES FOOT EXAM   3. Type 2 diabetes mellitus with peripheral vascular disease (HCC)  NH DEBRIDEMENT OF NAILS, 6 OR MORE    HM DIABETES FOOT EXAM   4. Diabetic polyneuropathy associated with type 2 diabetes mellitus (HCC)  NH DEBRIDEMENT OF NAILS, 6 OR MORE    HM DIABETES FOOT EXAM       Plan:  1. Clinical evaluation of the patient. 2.  Toenails 1-5 of the right foot and 1-5 of the left foot were debrided in length and thickness using a nail nipper and a . Patient educated on proper diabetic foot care. 3. Contact office with any questions/problems/concerns. Return in about 9 weeks (around 5/18/2020) for At risk diabetic foot care.    3/16/2020      Jarvis Flanagan DPM

## 2020-03-20 RX ORDER — PIOGLITAZONEHYDROCHLORIDE 15 MG/1
TABLET ORAL
Qty: 90 TABLET | Refills: 0 | Status: SHIPPED | OUTPATIENT
Start: 2020-03-20 | End: 2021-10-02

## 2020-09-22 RX ORDER — SITAGLIPTIN AND METFORMIN HYDROCHLORIDE 1000; 50 MG/1; MG/1
TABLET, FILM COATED, EXTENDED RELEASE ORAL
Qty: 180 TABLET | Refills: 1 | OUTPATIENT
Start: 2020-09-22

## 2020-09-22 RX ORDER — IBUPROFEN 800 MG/1
TABLET ORAL
Qty: 270 TABLET | Refills: 1 | OUTPATIENT
Start: 2020-09-22

## 2021-07-21 ENCOUNTER — TELEPHONE (OUTPATIENT)
Dept: GASTROENTEROLOGY | Age: 51
End: 2021-07-21

## 2021-07-21 NOTE — TELEPHONE ENCOUNTER
Pt was in office with his wife and requested to scheduled a new patient appt. Writer reviewed chart and advised pt to have a referral faxed to office from pcp prior to scheduling.  Pt verbalized understanding

## 2021-08-31 ENCOUNTER — HOSPITAL ENCOUNTER (OUTPATIENT)
Age: 51
Setting detail: SPECIMEN
Discharge: HOME | End: 2021-08-31
Payer: COMMERCIAL

## 2021-08-31 ENCOUNTER — OFFICE VISIT (OUTPATIENT)
Dept: UROLOGY | Age: 51
End: 2021-08-31
Payer: COMMERCIAL

## 2021-08-31 VITALS
HEIGHT: 67 IN | BODY MASS INDEX: 49.44 KG/M2 | SYSTOLIC BLOOD PRESSURE: 136 MMHG | WEIGHT: 315 LBS | HEART RATE: 92 BPM | TEMPERATURE: 98.4 F | DIASTOLIC BLOOD PRESSURE: 84 MMHG

## 2021-08-31 DIAGNOSIS — B37.9 YEAST INFECTION: ICD-10-CM

## 2021-08-31 DIAGNOSIS — N39.0 FREQUENT UTI: Primary | ICD-10-CM

## 2021-08-31 LAB
BILIRUBIN, POC: ABNORMAL
BLOOD URINE, POC: ABNORMAL
CLARITY, POC: ABNORMAL
COLOR, POC: YELLOW
GLUCOSE URINE, POC: ABNORMAL
KETONES, POC: ABNORMAL
LEUKOCYTE EST, POC: ABNORMAL
NITRITE, POC: ABNORMAL
PH, POC: ABNORMAL
PROTEIN, POC: ABNORMAL
SPECIFIC GRAVITY, POC: ABNORMAL
UROBILINOGEN, POC: ABNORMAL

## 2021-08-31 PROCEDURE — G8427 DOCREV CUR MEDS BY ELIG CLIN: HCPCS | Performed by: UROLOGY

## 2021-08-31 PROCEDURE — G8417 CALC BMI ABV UP PARAM F/U: HCPCS | Performed by: UROLOGY

## 2021-08-31 PROCEDURE — 1036F TOBACCO NON-USER: CPT | Performed by: UROLOGY

## 2021-08-31 PROCEDURE — 3017F COLORECTAL CA SCREEN DOC REV: CPT | Performed by: UROLOGY

## 2021-08-31 PROCEDURE — 81002 URINALYSIS NONAUTO W/O SCOPE: CPT | Performed by: UROLOGY

## 2021-08-31 PROCEDURE — 99204 OFFICE O/P NEW MOD 45 MIN: CPT | Performed by: UROLOGY

## 2021-08-31 RX ORDER — NYSTATIN 100000 [USP'U]/G
POWDER TOPICAL
Qty: 1 BOTTLE | Refills: 3 | Status: SHIPPED | OUTPATIENT
Start: 2021-08-31

## 2021-08-31 ASSESSMENT — ENCOUNTER SYMPTOMS
VOMITING: 0
RESPIRATORY NEGATIVE: 1
SHORTNESS OF BREATH: 0
EYES NEGATIVE: 1
DIARRHEA: 0
BACK PAIN: 0
NAUSEA: 0
COUGH: 0
WHEEZING: 0
EYE PAIN: 0
ABDOMINAL PAIN: 1
CONSTIPATION: 1
EYE REDNESS: 0

## 2021-08-31 NOTE — PROGRESS NOTES
[address] [address]  Dept: [phone]  Dept Fax: [phone] [facility] Office Note - New patient    Patient:  Sanjay Bradley  YOB: 1970  Date: 8/31/2021    The patient is a 49 y.o. male who presents todayfor evaluation of the following problems:   Chief Complaint   Patient presents with    New Patient     Frequent UTIs    referred by Nusrat Lowry. HPI  He is here for recurrent UTIs. He has been having some concerns of a yeast infection as well. When he has an infection, his urine is cloudy and he has itching and burning with urination. He has some urinary urgency as well. His urgency does happen every day. He had penile surgery as a child. He had a cysto in the past as well for a presumed stricture. Some days he has a good a normal stream, other days it is a trickle.      (Patient's old records have been requested, reviewed and summarized in today's note.)    Summary of old records: N/A    Additional History: N/A    Procedures Today: N/A    Last several PSA's:  No results found for: PSA  Last total testosterone:  No results found for: TESTOSTERONE  Urinalysis today:  Results for POC orders placed in visit on 08/31/21   POCT Urinalysis no Micro   Result Value Ref Range    Color, UA Yellow     Clarity, UA cloudy     Glucose, UA POC ++++     Bilirubin, UA      Ketones, UA      Spec Grav, UA      Blood, UA POC +++     pH, UA      Protein, UA POC pos     Urobilinogen, UA      Leukocytes, UA neg     Nitrite, UA pos        AUA Symptom Score (8/31/2021):                               Last BUN and creatinine:  Lab Results   Component Value Date    BUN 34 (H) 11/12/2019     Lab Results   Component Value Date    CREATININE 1.34 (H) 11/12/2019       Additional Lab/Culture results: none    Imaging Reviewed during this Office Visit: none  (results were independently reviewed by physician and radiology inhaler Inhale 2 puffs into the lungs every 6 hours as needed for Shortness of Breath 1 Inhaler 5    Misc Natural Products (GLUCOSAMINE CHONDROITIN ADV) TABS Take by mouth      Elastic Bandages & Supports (V-2 HIGH COMPRESSION HOSE) MISC 30-40mmHg pressure stockings, knee high. Wear 12 hours while awake and remove at bedtime 2 each 0    Blood Glucose Monitoring Suppl (Skimlinks CONTOUR MONITOR) W/DEVICE KIT use twice a day  0    MICROLET LANCETS MISC use twice a day  0    fluticasone (FLONASE) 50 MCG/ACT SUSP 1 spray by Nasal route 2 times daily (Patient taking differently: 1 spray by Nasal route as needed ) 1 Bottle 0    glucose blood VI test strips (BL TEST STRIP PACK) strip 1 each by Does not apply route 2 times daily. As needed. 100 each 3    nitroGLYCERIN (NITROSTAT) 0.4 MG SL tablet Place 0.4 mg under the tongue every 5 minutes as needed.  CRANBERRY 1,680 mg by Does not apply route.  FISH OIL Take 1,000 mg by mouth daily. Please get dosage      VITAMIN D PO Take 2,000 Int'l Units by mouth Please get dosage        aspirin 81 MG tablet Take 81 mg by mouth daily. Patient has no known allergies. Social History     Tobacco Use   Smoking Status Former Smoker    Packs/day: 0.00    Years: 1.00    Pack years: 0.00    Types: Cigarettes    Quit date: 2015    Years since quittin.1   Smokeless Tobacco Never Used      (If patient a smoker, smoking cessation counseling offered)   Social History     Substance and Sexual Activity   Alcohol Use Yes    Alcohol/week: 0.0 standard drinks    Comment: 1-2/month       REVIEW OF SYSTEMS:  Review of Systems    Physical Exam:    This a 49 y.o. male   Vitals:    21 1025   BP: 136/84   Pulse: 92   Temp: 98.4 °F (36.9 °C)     Body mass index is 64.06 kg/m². Physical Exam  Constitutional: Patient in no acute distress. Neuro: Alert and oriented to person, place and time.   Psych: Mood normal, affect normal  Lungs:Respiratory effort is normal  Cardiovascular: Warm & Pink  Abdomen: Soft, non-tender, non-distendedwith no CVA,  No flank tenderness,  Orhepatosplenomegaly   Lymphatics: No palpable lymphadenopathy. Bladder non-tender and not distended. 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 There is redundant foreskin and some scarring that look like previous surgery on his penis. Assessment and Plan      1. Frequent UTI    2. Yeast infection           Plan:        His urine today looks infected. If culture is positive, will need a renal ultrasound and a cysto. Nystatin refilled for groin yeast infection. Prescriptions Ordered:  Orders Placed This Encounter   Medications    nystatin (MYCOSTATIN) 615082 UNIT/GM powder     Sig: Apply 2 times daily for 10 days. Dispense:  1 Bottle     Refill:  3      Orders Placed:  Orders Placed This Encounter   Procedures    Culture, Urine     Standing Status:   Future     Standing Expiration Date:   8/31/2022     Order Specific Question:   Specify (ex-cath, midstream, cysto, etc)? Answer:   midstream    US RENAL LIMITED     Standing Status:   Future     Standing Expiration Date:   9/1/2022    POCT Urinalysis no Micro             Renae Childress MD    Agree with the ROS entered by the MA.

## 2021-09-01 DIAGNOSIS — N39.0 FREQUENT UTI: ICD-10-CM

## 2021-09-03 LAB
CULTURE: ABNORMAL
CULTURE: ABNORMAL
Lab: ABNORMAL
SPECIMEN DESCRIPTION: ABNORMAL

## 2021-09-03 RX ORDER — CIPROFLOXACIN 250 MG/1
TABLET, FILM COATED ORAL
Qty: 28 TABLET | Refills: 0 | Status: SHIPPED | OUTPATIENT
Start: 2021-09-03 | End: 2021-10-02

## 2021-09-24 ENCOUNTER — HOSPITAL ENCOUNTER (OUTPATIENT)
Dept: ULTRASOUND IMAGING | Age: 51
Discharge: HOME | End: 2021-09-26
Payer: COMMERCIAL

## 2021-09-24 DIAGNOSIS — N39.0 FREQUENT UTI: ICD-10-CM

## 2021-09-24 PROCEDURE — 76775 US EXAM ABDO BACK WALL LIM: CPT

## 2021-10-01 ENCOUNTER — HOSPITAL ENCOUNTER (INPATIENT)
Age: 51
LOS: 4 days | Discharge: HOME | DRG: 638 | End: 2021-10-05
Attending: EMERGENCY MEDICINE | Admitting: INTERNAL MEDICINE
Payer: COMMERCIAL

## 2021-10-01 ENCOUNTER — APPOINTMENT (OUTPATIENT)
Dept: GENERAL RADIOLOGY | Age: 51
DRG: 638 | End: 2021-10-01
Payer: COMMERCIAL

## 2021-10-01 DIAGNOSIS — L03.116 CELLULITIS OF LEFT LOWER EXTREMITY: ICD-10-CM

## 2021-10-01 DIAGNOSIS — N30.00 ACUTE CYSTITIS WITHOUT HEMATURIA: Primary | ICD-10-CM

## 2021-10-01 PROBLEM — N39.0 COMPLICATED UTI (URINARY TRACT INFECTION): Status: ACTIVE | Noted: 2021-10-01

## 2021-10-01 LAB
-: ABNORMAL
ABSOLUTE EOS #: 0.2 K/UL (ref 0–0.4)
ABSOLUTE IMMATURE GRANULOCYTE: ABNORMAL K/UL (ref 0–0.3)
ABSOLUTE LYMPH #: 2.6 K/UL (ref 1–4.8)
ABSOLUTE MONO #: 1.5 K/UL (ref 0.1–1.3)
ALBUMIN SERPL-MCNC: 3.7 G/DL (ref 3.5–5.2)
ALBUMIN/GLOBULIN RATIO: ABNORMAL (ref 1–2.5)
ALP BLD-CCNC: 100 U/L (ref 40–129)
ALT SERPL-CCNC: 22 U/L (ref 5–41)
AMORPHOUS: ABNORMAL
ANION GAP SERPL CALCULATED.3IONS-SCNC: 14 MMOL/L (ref 9–17)
AST SERPL-CCNC: 21 U/L
BACTERIA: ABNORMAL
BASOPHILS # BLD: 0 % (ref 0–2)
BASOPHILS ABSOLUTE: 0 K/UL (ref 0–0.2)
BILIRUB SERPL-MCNC: 0.29 MG/DL (ref 0.3–1.2)
BILIRUBIN URINE: NEGATIVE
BNP INTERPRETATION: ABNORMAL
BUN BLDV-MCNC: 20 MG/DL (ref 6–20)
BUN/CREAT BLD: ABNORMAL (ref 9–20)
C-REACTIVE PROTEIN: 145.1 MG/L (ref 0–5)
CALCIUM SERPL-MCNC: 8.8 MG/DL (ref 8.6–10.4)
CASTS UA: ABNORMAL /LPF
CHLORIDE BLD-SCNC: 98 MMOL/L (ref 98–107)
CO2: 19 MMOL/L (ref 20–31)
COLOR: YELLOW
COMMENT UA: ABNORMAL
CREAT SERPL-MCNC: 1.32 MG/DL (ref 0.7–1.2)
CRYSTALS, UA: ABNORMAL /HPF
D-DIMER QUANTITATIVE: 1.05 MG/L FEU (ref 0–0.59)
DIFFERENTIAL TYPE: ABNORMAL
EOSINOPHILS RELATIVE PERCENT: 1 % (ref 0–4)
EPITHELIAL CELLS UA: ABNORMAL /HPF
GFR AFRICAN AMERICAN: >60 ML/MIN
GFR NON-AFRICAN AMERICAN: 57 ML/MIN
GFR SERPL CREATININE-BSD FRML MDRD: ABNORMAL ML/MIN/{1.73_M2}
GFR SERPL CREATININE-BSD FRML MDRD: ABNORMAL ML/MIN/{1.73_M2}
GLUCOSE BLD-MCNC: 229 MG/DL (ref 70–99)
GLUCOSE URINE: ABNORMAL
HCT VFR BLD CALC: 29.4 % (ref 41–53)
HEMOGLOBIN: 9.3 G/DL (ref 13.5–17.5)
IMMATURE GRANULOCYTES: ABNORMAL %
KETONES, URINE: NEGATIVE
LEUKOCYTE ESTERASE, URINE: ABNORMAL
LIPASE: 47 U/L (ref 13–60)
LYMPHOCYTES # BLD: 18 % (ref 24–44)
MAGNESIUM: 1.8 MG/DL (ref 1.6–2.6)
MCH RBC QN AUTO: 24.6 PG (ref 26–34)
MCHC RBC AUTO-ENTMCNC: 31.5 G/DL (ref 31–37)
MCV RBC AUTO: 77.9 FL (ref 80–100)
MONOCYTES # BLD: 10 % (ref 1–7)
MUCUS: ABNORMAL
NITRITE, URINE: NEGATIVE
NRBC AUTOMATED: ABNORMAL PER 100 WBC
OTHER OBSERVATIONS UA: ABNORMAL
PDW BLD-RTO: 15.5 % (ref 11.5–14.9)
PH UA: 5.5 (ref 5–8)
PLATELET # BLD: 306 K/UL (ref 150–450)
PLATELET ESTIMATE: ABNORMAL
PMV BLD AUTO: 6.8 FL (ref 6–12)
POTASSIUM SERPL-SCNC: 3.4 MMOL/L (ref 3.7–5.3)
PRO-BNP: 678 PG/ML
PROTEIN UA: ABNORMAL
RBC # BLD: 3.77 M/UL (ref 4.5–5.9)
RBC # BLD: ABNORMAL 10*6/UL
RBC UA: ABNORMAL /HPF
RENAL EPITHELIAL, UA: ABNORMAL /HPF
SARS-COV-2, RAPID: NOT DETECTED
SEDIMENTATION RATE, ERYTHROCYTE: 46 MM (ref 0–20)
SEG NEUTROPHILS: 71 % (ref 36–66)
SEGMENTED NEUTROPHILS ABSOLUTE COUNT: 10.7 K/UL (ref 1.3–9.1)
SODIUM BLD-SCNC: 131 MMOL/L (ref 135–144)
SPECIFIC GRAVITY UA: 1.03 (ref 1–1.03)
SPECIMEN DESCRIPTION: NORMAL
TOTAL PROTEIN: 7.6 G/DL (ref 6.4–8.3)
TRICHOMONAS: ABNORMAL
TROPONIN INTERP: NORMAL
TROPONIN T: NORMAL NG/ML
TROPONIN, HIGH SENSITIVITY: 21 NG/L (ref 0–22)
TURBIDITY: ABNORMAL
URINE HGB: ABNORMAL
UROBILINOGEN, URINE: NORMAL
WBC # BLD: 15.1 K/UL (ref 3.5–11)
WBC # BLD: ABNORMAL 10*3/UL
WBC UA: ABNORMAL /HPF
YEAST: ABNORMAL

## 2021-10-01 PROCEDURE — 93005 ELECTROCARDIOGRAM TRACING: CPT | Performed by: STUDENT IN AN ORGANIZED HEALTH CARE EDUCATION/TRAINING PROGRAM

## 2021-10-01 PROCEDURE — 99223 1ST HOSP IP/OBS HIGH 75: CPT | Performed by: INTERNAL MEDICINE

## 2021-10-01 PROCEDURE — 96374 THER/PROPH/DIAG INJ IV PUSH: CPT

## 2021-10-01 PROCEDURE — 85379 FIBRIN DEGRADATION QUANT: CPT

## 2021-10-01 PROCEDURE — 83735 ASSAY OF MAGNESIUM: CPT

## 2021-10-01 PROCEDURE — 80053 COMPREHEN METABOLIC PANEL: CPT

## 2021-10-01 PROCEDURE — 99283 EMERGENCY DEPT VISIT LOW MDM: CPT

## 2021-10-01 PROCEDURE — 83690 ASSAY OF LIPASE: CPT

## 2021-10-01 PROCEDURE — 87086 URINE CULTURE/COLONY COUNT: CPT

## 2021-10-01 PROCEDURE — 86403 PARTICLE AGGLUT ANTBDY SCRN: CPT

## 2021-10-01 PROCEDURE — 2060000000 HC ICU INTERMEDIATE R&B

## 2021-10-01 PROCEDURE — 71045 X-RAY EXAM CHEST 1 VIEW: CPT

## 2021-10-01 PROCEDURE — 86140 C-REACTIVE PROTEIN: CPT

## 2021-10-01 PROCEDURE — 87635 SARS-COV-2 COVID-19 AMP PRB: CPT

## 2021-10-01 PROCEDURE — 84484 ASSAY OF TROPONIN QUANT: CPT

## 2021-10-01 PROCEDURE — 2580000003 HC RX 258: Performed by: STUDENT IN AN ORGANIZED HEALTH CARE EDUCATION/TRAINING PROGRAM

## 2021-10-01 PROCEDURE — 36415 COLL VENOUS BLD VENIPUNCTURE: CPT

## 2021-10-01 PROCEDURE — 6360000002 HC RX W HCPCS: Performed by: STUDENT IN AN ORGANIZED HEALTH CARE EDUCATION/TRAINING PROGRAM

## 2021-10-01 PROCEDURE — 82947 ASSAY GLUCOSE BLOOD QUANT: CPT

## 2021-10-01 PROCEDURE — 83880 ASSAY OF NATRIURETIC PEPTIDE: CPT

## 2021-10-01 PROCEDURE — 96375 TX/PRO/DX INJ NEW DRUG ADDON: CPT

## 2021-10-01 PROCEDURE — 85652 RBC SED RATE AUTOMATED: CPT

## 2021-10-01 PROCEDURE — 85025 COMPLETE CBC W/AUTO DIFF WBC: CPT

## 2021-10-01 PROCEDURE — 81001 URINALYSIS AUTO W/SCOPE: CPT

## 2021-10-01 RX ORDER — ONDANSETRON 2 MG/ML
4 INJECTION INTRAMUSCULAR; INTRAVENOUS ONCE
Status: COMPLETED | OUTPATIENT
Start: 2021-10-01 | End: 2021-10-01

## 2021-10-01 RX ORDER — 0.9 % SODIUM CHLORIDE 0.9 %
1000 INTRAVENOUS SOLUTION INTRAVENOUS ONCE
Status: COMPLETED | OUTPATIENT
Start: 2021-10-01 | End: 2021-10-02

## 2021-10-01 RX ORDER — KETOROLAC TROMETHAMINE 30 MG/ML
15 INJECTION, SOLUTION INTRAMUSCULAR; INTRAVENOUS ONCE
Status: COMPLETED | OUTPATIENT
Start: 2021-10-01 | End: 2021-10-01

## 2021-10-01 RX ADMIN — SODIUM CHLORIDE 1000 ML: 9 INJECTION, SOLUTION INTRAVENOUS at 23:09

## 2021-10-01 RX ADMIN — ONDANSETRON 4 MG: 2 INJECTION INTRAMUSCULAR; INTRAVENOUS at 23:10

## 2021-10-01 RX ADMIN — KETOROLAC TROMETHAMINE 15 MG: 30 INJECTION, SOLUTION INTRAMUSCULAR; INTRAVENOUS at 23:09

## 2021-10-01 ASSESSMENT — PAIN SCALES - GENERAL: PAINLEVEL_OUTOF10: 7

## 2021-10-01 NOTE — LETTER
[address] [facility] [address]  Phone: [phone]             October 5, 2021    Patient: Robert Molina   YOB: 1970   Date of Visit: 10/1/2021       To Whom It May Concern:    Nicol Tolbert was seen and treated in our facility  beginning 10/1/2021 until 10/05/2021. He may return to work with no restrictions on 10/11/2021.       Sincerely,       Abebe Szymanski MD        Signature:__________________________________

## 2021-10-01 NOTE — LETTER
[address] [facility] [address]  Phone: [phone]             October 5, 2021    Patient: Beth Eaton   YOB: 1970   Date of Visit: 10/1/2021       To Whom It May Concern:    Zulma Parrish was seen and treated in our facility  beginning 10/1/2021 until 10/5/2021 . He may return to work on 10/11/21.       Sincerely,       Jay Mcclain MD        Signature:__________________________________

## 2021-10-02 PROBLEM — L03.116 CELLULITIS OF LEFT LOWER EXTREMITY: Status: ACTIVE | Noted: 2021-10-02

## 2021-10-02 LAB
GLUCOSE BLD-MCNC: 183 MG/DL (ref 75–110)
GLUCOSE BLD-MCNC: 212 MG/DL (ref 75–110)
GLUCOSE BLD-MCNC: 220 MG/DL (ref 75–110)
GLUCOSE BLD-MCNC: 240 MG/DL (ref 75–110)
GLUCOSE BLD-MCNC: 268 MG/DL (ref 75–110)
TROPONIN INTERP: NORMAL
TROPONIN T: NORMAL NG/ML
TROPONIN, HIGH SENSITIVITY: 20 NG/L (ref 0–22)

## 2021-10-02 PROCEDURE — 36415 COLL VENOUS BLD VENIPUNCTURE: CPT

## 2021-10-02 PROCEDURE — 6360000002 HC RX W HCPCS: Performed by: STUDENT IN AN ORGANIZED HEALTH CARE EDUCATION/TRAINING PROGRAM

## 2021-10-02 PROCEDURE — 84484 ASSAY OF TROPONIN QUANT: CPT

## 2021-10-02 PROCEDURE — 2580000003 HC RX 258: Performed by: NURSE PRACTITIONER

## 2021-10-02 PROCEDURE — 6370000000 HC RX 637 (ALT 250 FOR IP): Performed by: NURSE PRACTITIONER

## 2021-10-02 PROCEDURE — 2580000003 HC RX 258: Performed by: STUDENT IN AN ORGANIZED HEALTH CARE EDUCATION/TRAINING PROGRAM

## 2021-10-02 PROCEDURE — 2500000003 HC RX 250 WO HCPCS: Performed by: NURSE PRACTITIONER

## 2021-10-02 PROCEDURE — 2060000000 HC ICU INTERMEDIATE R&B

## 2021-10-02 PROCEDURE — 94660 CPAP INITIATION&MGMT: CPT

## 2021-10-02 PROCEDURE — 6360000002 HC RX W HCPCS: Performed by: NURSE PRACTITIONER

## 2021-10-02 PROCEDURE — 93971 EXTREMITY STUDY: CPT

## 2021-10-02 PROCEDURE — 6370000000 HC RX 637 (ALT 250 FOR IP): Performed by: STUDENT IN AN ORGANIZED HEALTH CARE EDUCATION/TRAINING PROGRAM

## 2021-10-02 RX ORDER — ATORVASTATIN CALCIUM 20 MG/1
20 TABLET, FILM COATED ORAL DAILY
Status: DISCONTINUED | OUTPATIENT
Start: 2021-10-02 | End: 2021-10-05 | Stop reason: HOSPADM

## 2021-10-02 RX ORDER — POLYETHYLENE GLYCOL 3350 17 G/17G
17 POWDER, FOR SOLUTION ORAL DAILY PRN
Status: DISCONTINUED | OUTPATIENT
Start: 2021-10-02 | End: 2021-10-05 | Stop reason: HOSPADM

## 2021-10-02 RX ORDER — ALOGLIPTIN 25 MG/1
25 TABLET, FILM COATED ORAL DAILY
COMMUNITY
End: 2022-09-19

## 2021-10-02 RX ORDER — SODIUM CHLORIDE 0.9 % (FLUSH) 0.9 %
10 SYRINGE (ML) INJECTION PRN
Status: DISCONTINUED | OUTPATIENT
Start: 2021-10-02 | End: 2021-10-05 | Stop reason: HOSPADM

## 2021-10-02 RX ORDER — POTASSIUM CHLORIDE 20 MEQ/1
40 TABLET, EXTENDED RELEASE ORAL PRN
Status: DISCONTINUED | OUTPATIENT
Start: 2021-10-02 | End: 2021-10-05 | Stop reason: HOSPADM

## 2021-10-02 RX ORDER — SODIUM CHLORIDE 0.9 % (FLUSH) 0.9 %
5-40 SYRINGE (ML) INJECTION EVERY 12 HOURS SCHEDULED
Status: DISCONTINUED | OUTPATIENT
Start: 2021-10-02 | End: 2021-10-05 | Stop reason: HOSPADM

## 2021-10-02 RX ORDER — BUPROPION HYDROCHLORIDE 300 MG/1
300 TABLET ORAL EVERY MORNING
Status: DISCONTINUED | OUTPATIENT
Start: 2021-10-02 | End: 2021-10-05 | Stop reason: HOSPADM

## 2021-10-02 RX ORDER — SPIRONOLACTONE 25 MG/1
50 TABLET ORAL 2 TIMES DAILY
Status: DISCONTINUED | OUTPATIENT
Start: 2021-10-02 | End: 2021-10-05 | Stop reason: HOSPADM

## 2021-10-02 RX ORDER — ACETAMINOPHEN 325 MG/1
650 TABLET ORAL EVERY 6 HOURS PRN
Status: DISCONTINUED | OUTPATIENT
Start: 2021-10-02 | End: 2021-10-05 | Stop reason: HOSPADM

## 2021-10-02 RX ORDER — HYDROCODONE BITARTRATE AND ACETAMINOPHEN 5; 325 MG/1; MG/1
1 TABLET ORAL EVERY 6 HOURS PRN
Status: DISCONTINUED | OUTPATIENT
Start: 2021-10-02 | End: 2021-10-05 | Stop reason: HOSPADM

## 2021-10-02 RX ORDER — ALOGLIPTIN 25 MG/1
25 TABLET, FILM COATED ORAL DAILY
Status: DISCONTINUED | OUTPATIENT
Start: 2021-10-02 | End: 2021-10-05 | Stop reason: HOSPADM

## 2021-10-02 RX ORDER — POTASSIUM CHLORIDE 20 MEQ/1
40 TABLET, EXTENDED RELEASE ORAL ONCE
Status: COMPLETED | OUTPATIENT
Start: 2021-10-02 | End: 2021-10-02

## 2021-10-02 RX ORDER — MAGNESIUM SULFATE 1 G/100ML
1000 INJECTION INTRAVENOUS PRN
Status: DISCONTINUED | OUTPATIENT
Start: 2021-10-02 | End: 2021-10-05 | Stop reason: HOSPADM

## 2021-10-02 RX ORDER — NITROGLYCERIN 0.4 MG/1
0.4 TABLET SUBLINGUAL EVERY 5 MIN PRN
Status: DISCONTINUED | OUTPATIENT
Start: 2021-10-02 | End: 2021-10-05 | Stop reason: HOSPADM

## 2021-10-02 RX ORDER — PANTOPRAZOLE SODIUM 40 MG/1
40 TABLET, DELAYED RELEASE ORAL
Status: DISCONTINUED | OUTPATIENT
Start: 2021-10-03 | End: 2021-10-05 | Stop reason: HOSPADM

## 2021-10-02 RX ORDER — ACETAMINOPHEN 650 MG/1
650 SUPPOSITORY RECTAL EVERY 6 HOURS PRN
Status: DISCONTINUED | OUTPATIENT
Start: 2021-10-02 | End: 2021-10-05 | Stop reason: HOSPADM

## 2021-10-02 RX ORDER — ONDANSETRON 2 MG/ML
4 INJECTION INTRAMUSCULAR; INTRAVENOUS EVERY 6 HOURS PRN
Status: DISCONTINUED | OUTPATIENT
Start: 2021-10-02 | End: 2021-10-05 | Stop reason: HOSPADM

## 2021-10-02 RX ORDER — ALBUTEROL SULFATE 90 UG/1
2 AEROSOL, METERED RESPIRATORY (INHALATION) EVERY 6 HOURS PRN
Status: DISCONTINUED | OUTPATIENT
Start: 2021-10-02 | End: 2021-10-05 | Stop reason: HOSPADM

## 2021-10-02 RX ORDER — DEXTROSE MONOHYDRATE 50 MG/ML
100 INJECTION, SOLUTION INTRAVENOUS PRN
Status: DISCONTINUED | OUTPATIENT
Start: 2021-10-02 | End: 2021-10-03 | Stop reason: SDUPTHER

## 2021-10-02 RX ORDER — POTASSIUM BICARBONATE 25 MEQ/1
50 TABLET, EFFERVESCENT ORAL PRN
Status: DISCONTINUED | OUTPATIENT
Start: 2021-10-02 | End: 2021-10-05 | Stop reason: HOSPADM

## 2021-10-02 RX ORDER — NICOTINE POLACRILEX 4 MG
15 LOZENGE BUCCAL PRN
Status: DISCONTINUED | OUTPATIENT
Start: 2021-10-02 | End: 2021-10-05 | Stop reason: HOSPADM

## 2021-10-02 RX ORDER — ONDANSETRON 4 MG/1
4 TABLET, ORALLY DISINTEGRATING ORAL EVERY 8 HOURS PRN
Status: DISCONTINUED | OUTPATIENT
Start: 2021-10-02 | End: 2021-10-05 | Stop reason: HOSPADM

## 2021-10-02 RX ORDER — ASPIRIN 81 MG/1
81 TABLET ORAL DAILY
Status: DISCONTINUED | OUTPATIENT
Start: 2021-10-02 | End: 2021-10-05 | Stop reason: HOSPADM

## 2021-10-02 RX ORDER — CETIRIZINE HYDROCHLORIDE 10 MG/1
10 TABLET ORAL DAILY
COMMUNITY

## 2021-10-02 RX ORDER — CETIRIZINE HYDROCHLORIDE 10 MG/1
10 TABLET ORAL DAILY
Status: DISCONTINUED | OUTPATIENT
Start: 2021-10-02 | End: 2021-10-05 | Stop reason: HOSPADM

## 2021-10-02 RX ORDER — SODIUM CHLORIDE 9 MG/ML
25 INJECTION, SOLUTION INTRAVENOUS PRN
Status: DISCONTINUED | OUTPATIENT
Start: 2021-10-02 | End: 2021-10-05 | Stop reason: HOSPADM

## 2021-10-02 RX ORDER — DEXTROSE MONOHYDRATE 25 G/50ML
12.5 INJECTION, SOLUTION INTRAVENOUS PRN
Status: DISCONTINUED | OUTPATIENT
Start: 2021-10-02 | End: 2021-10-05 | Stop reason: HOSPADM

## 2021-10-02 RX ORDER — KETOROLAC TROMETHAMINE 30 MG/ML
30 INJECTION, SOLUTION INTRAMUSCULAR; INTRAVENOUS EVERY 6 HOURS PRN
Status: DISPENSED | OUTPATIENT
Start: 2021-10-02 | End: 2021-10-05

## 2021-10-02 RX ORDER — SODIUM CHLORIDE 9 MG/ML
INJECTION, SOLUTION INTRAVENOUS CONTINUOUS
Status: DISCONTINUED | OUTPATIENT
Start: 2021-10-02 | End: 2021-10-04

## 2021-10-02 RX ORDER — POTASSIUM CHLORIDE 7.45 MG/ML
10 INJECTION INTRAVENOUS PRN
Status: DISCONTINUED | OUTPATIENT
Start: 2021-10-02 | End: 2021-10-05 | Stop reason: HOSPADM

## 2021-10-02 RX ADMIN — INSULIN LISPRO 1 UNITS: 100 INJECTION, SOLUTION INTRAVENOUS; SUBCUTANEOUS at 20:30

## 2021-10-02 RX ADMIN — INSULIN LISPRO 6 UNITS: 100 INJECTION, SOLUTION INTRAVENOUS; SUBCUTANEOUS at 12:52

## 2021-10-02 RX ADMIN — KETOROLAC TROMETHAMINE 30 MG: 30 INJECTION, SOLUTION INTRAMUSCULAR; INTRAVENOUS at 12:51

## 2021-10-02 RX ADMIN — BUPROPION HYDROCHLORIDE 300 MG: 300 TABLET, FILM COATED, EXTENDED RELEASE ORAL at 12:48

## 2021-10-02 RX ADMIN — CEFTRIAXONE SODIUM 1000 MG: 1 INJECTION, POWDER, FOR SOLUTION INTRAMUSCULAR; INTRAVENOUS at 00:17

## 2021-10-02 RX ADMIN — SPIRONOLACTONE 50 MG: 25 TABLET, FILM COATED ORAL at 12:48

## 2021-10-02 RX ADMIN — SPIRONOLACTONE 50 MG: 25 TABLET, FILM COATED ORAL at 20:33

## 2021-10-02 RX ADMIN — METFORMIN HYDROCHLORIDE 1000 MG: 1000 TABLET ORAL at 16:33

## 2021-10-02 RX ADMIN — VANCOMYCIN HYDROCHLORIDE 1250 MG: 1.25 INJECTION, POWDER, LYOPHILIZED, FOR SOLUTION INTRAVENOUS at 19:52

## 2021-10-02 RX ADMIN — SODIUM CHLORIDE: 9 INJECTION, SOLUTION INTRAVENOUS at 03:30

## 2021-10-02 RX ADMIN — ATORVASTATIN CALCIUM 20 MG: 20 TABLET, FILM COATED ORAL at 12:50

## 2021-10-02 RX ADMIN — ASPIRIN 81 MG: 81 TABLET, COATED ORAL at 12:47

## 2021-10-02 RX ADMIN — ALOGLIPTIN 25 MG: 25 TABLET, FILM COATED ORAL at 12:49

## 2021-10-02 RX ADMIN — ANTI-FUNGAL POWDER MICONAZOLE NITRATE TALC FREE: 1.42 POWDER TOPICAL at 20:33

## 2021-10-02 RX ADMIN — ENOXAPARIN SODIUM 180 MG: 100 INJECTION SUBCUTANEOUS at 00:17

## 2021-10-02 RX ADMIN — KETOROLAC TROMETHAMINE 30 MG: 30 INJECTION, SOLUTION INTRAMUSCULAR; INTRAVENOUS at 19:06

## 2021-10-02 RX ADMIN — DICLOFENAC SODIUM 2 G: 10 GEL TOPICAL at 20:33

## 2021-10-02 RX ADMIN — VANCOMYCIN HYDROCHLORIDE 2500 MG: 1.5 INJECTION, POWDER, LYOPHILIZED, FOR SOLUTION INTRAVENOUS at 02:12

## 2021-10-02 RX ADMIN — POTASSIUM CHLORIDE 40 MEQ: 20 TABLET, EXTENDED RELEASE ORAL at 04:43

## 2021-10-02 RX ADMIN — ENOXAPARIN SODIUM 180 MG: 100 INJECTION SUBCUTANEOUS at 12:58

## 2021-10-02 RX ADMIN — INSULIN LISPRO 4 UNITS: 100 INJECTION, SOLUTION INTRAVENOUS; SUBCUTANEOUS at 16:33

## 2021-10-02 RX ADMIN — CETIRIZINE HYDROCHLORIDE 10 MG: 10 TABLET, FILM COATED ORAL at 12:49

## 2021-10-02 RX ADMIN — DICLOFENAC SODIUM 2 G: 10 GEL TOPICAL at 14:19

## 2021-10-02 ASSESSMENT — ENCOUNTER SYMPTOMS
ABDOMINAL PAIN: 0
RHINORRHEA: 0
CONSTIPATION: 0
COUGH: 1
ROS SKIN COMMENTS: LEFT LOWER LEG
DIARRHEA: 1
COLOR CHANGE: 1
VOMITING: 1
BACK PAIN: 0
ABDOMINAL PAIN: 1
WHEEZING: 0
DIARRHEA: 0
SORE THROAT: 0
SHORTNESS OF BREATH: 1
NAUSEA: 1

## 2021-10-02 ASSESSMENT — PAIN DESCRIPTION - PROGRESSION: CLINICAL_PROGRESSION: GRADUALLY WORSENING

## 2021-10-02 ASSESSMENT — PAIN - FUNCTIONAL ASSESSMENT: PAIN_FUNCTIONAL_ASSESSMENT: PREVENTS OR INTERFERES SOME ACTIVE ACTIVITIES AND ADLS

## 2021-10-02 ASSESSMENT — PAIN SCALES - GENERAL
PAINLEVEL_OUTOF10: 7
PAINLEVEL_OUTOF10: 5
PAINLEVEL_OUTOF10: 5
PAINLEVEL_OUTOF10: 7
PAINLEVEL_OUTOF10: 7

## 2021-10-02 ASSESSMENT — PAIN DESCRIPTION - FREQUENCY: FREQUENCY: CONTINUOUS

## 2021-10-02 ASSESSMENT — PAIN DESCRIPTION - ONSET: ONSET: ON-GOING

## 2021-10-02 ASSESSMENT — PAIN DESCRIPTION - DESCRIPTORS: DESCRIPTORS: HEADACHE;STABBING

## 2021-10-02 ASSESSMENT — PAIN DESCRIPTION - PAIN TYPE: TYPE: CHRONIC PAIN

## 2021-10-02 ASSESSMENT — PAIN DESCRIPTION - LOCATION: LOCATION: LEG

## 2021-10-02 ASSESSMENT — PAIN DESCRIPTION - ORIENTATION: ORIENTATION: LEFT;LOWER

## 2021-10-02 NOTE — ED PROVIDER NOTES
[address]  eMERGENCY dEPARTMENT eNCOUnter   Attending Attestation     Pt Name: Alana Dandy  MRN: [id number]  [place] 1970  Date of evaluation: 10/1/21       Alana Dandy is a 49 y.o. male who presents with Cellulitis, Shortness of Breath, and Chest Pain      History:   Patient states that for a few days now has been having chest pain shortness of breath cough headaches myalgias diarrhea and change in his taste and smell. Patient did get fully vaccinated against Covid and has not been around anybody sick that he knows of. Patient states for last 2 days he has been having swelling with redness and pain to his left lower extremity. Patient states he is normally ambulatory gets around has not been prolonged nonambulatory recently and is never had a blood clot before    Exam: Vitals:   Vitals:    10/01/21 2107   BP: 131/62   Pulse: 90   Resp: 20   Temp: 97.1 °F (36.2 °C)   TempSrc: Temporal   SpO2: 97%   Weight: (!) 385 lb (174.6 kg)   Height: 5' 6\" (1.676 m)     Heart regular rate rhythm no murmurs. Lungs clear to auscultation bilaterally. Patient is slightly tachypneic especially when he is talking. Patient is in no distress though. Left lower extremity does show redness circumferential with warmth and tenderness    I performed a history and physical examination of the patient and discussed management with the resident. I reviewed the residents note and agree with the documented findings and plan of care. Any areas of disagreement are noted on the chart. I was personally present for the key portions of any procedures. I have documented in the chart those procedures where I was not present during the key portions. I have personally reviewed all images and agree with the resident's interpretation. I have reviewed the emergency nurses triage note.  I agree with the chief complaint, past medical history, past surgical history, allergies, medications, social and family history as documented unless otherwise noted below. Documentation of the HPI, Physical Exam and Medical Decision Making performed by medical students or scribes is based on my personal performance of the HPI, PE and MDM. I personally evaluated and examined the patient in conjunction with the APC and agree with the assessment, treatment plan, and disposition of the patient as recorded by the APC. Additional findings are as noted.     Adrianna Melgar MD  Attending Emergency  Physician             Ann Peralta MD  10/01/21 1154

## 2021-10-02 NOTE — ED NOTES
Chart reviewed with receiving nurse for PCU #[id number]; Lenka Yancey RN. Admitting provider, diagnosis, lab results, MAR and blood glucose level reviewed. Author made nurse aware that orders were not released but noted sliding scale coverage for blood glucose for 220 was noted. Receiving nurse verbalized that she would release the orders and cover the blood sugar upon patient arrival to the unit. Receiving nurse stated that she was ready to receive patient. Will transfer patient to unit.       Jennifer Flores RN  10/02/21 1328

## 2021-10-02 NOTE — H&P
[address] [facility]     HISTORY AND PHYSICAL EXAMINATION            Date:   10/2/2021  Patientname:  Autumn Hercules  Date of admission:  10/1/2021  9:08 PM  MRN:   [id number]  Account:  [de-identified]  YOB: 1970  PCP:    Jordan Boggs  Room:   14/14  Code Status:    Prior    CHIEF COMPLAINT     Chief Complaint   Patient presents with    Cellulitis    Shortness of Breath    Chest Pain       HISTORY OF PRESENT ILLNESS  (Character, Onset, Location, Duration,  Exacerbating/RelievingFactors, Radiation,   Associated Symptoms, Severity )      The patient is a 49 y.o.  male, with a history of asthma, diabetes mellitus, HTN, hyperlipidemia, nonalcoholic hepatosteatosis, and unspecified sleep apnea, who presents with cellulitis, shortness of breath, and chest pain. According to patient, he has been experiencing flulike symptoms for the past 2 days, stating that he developed chills, headache, nausea, vomiting, and temp of 101 °F on 9/29. He later noted that he was having erythema, edema, and increased pain and warmth of left lower extremity. Symptoms are associated with headache, intermittent shortness of breath, and diarrhea. Finally, patient reports that he has been having dysuria and urinary urgency that started earlier today. Reports that he has a left atrophic kidney and required reconstructive urethral surgery in infancy. He has been having recurrent UTIs over the past few months. States that a couple of days after completing the antibiotics, UTI symptoms return. Reports that he has a cystoscopy scheduled for 10/5 at this facility with his urologist.  Denies dizziness and abdominal pain; nausea, vomiting, and diarrhea are improving. There are no aggravating or alleviating factors. Symptoms are reported as constant and moderate to severe; progressively worsening.     PAST MEDICAL HISTORY   Patient  has a past medical history of Acute pyelonephritis, Asthma, Diabetes, GERD (gastroesophageal reflux disease), Hyperlipidemia, Liver disease, MVP (mitral valve prolapse), Screening PSA (prostate specific antigen), and Unspecified sleep apnea. PAST SURGICAL HISTORY    Patient  has a past surgical history that includes Anterior cruciate ligament repair (2/2009) and Tonsillectomy (2005). FAMILY HISTORY    Patient family history is not on file. He was adopted. SOCIAL HISTORY    Patient  reports that he quit smoking about 6 years ago. His smoking use included cigarettes. He smoked 0.00 packs per day for 1.00 year. He has never used smokeless tobacco. He reports current alcohol use. He reports that he does not use drugs. HOME MEDICATIONS        Prior to Admission medications    Medication Sig Start Date End Date Taking? Authorizing Provider   cetirizine (ZYRTEC) 10 MG tablet Take 10 mg by mouth daily   Yes Historical Provider, MD   empagliflozin (JARDIANCE) 25 MG tablet Take 25 mg by mouth daily   Yes Historical Provider, MD   alogliptin (NESINA) 25 MG TABS tablet Take 25 mg by mouth daily   Yes Historical Provider, MD   metFORMIN (GLUCOPHAGE) 1000 MG tablet Take 1,000 mg by mouth 2 times daily (with meals)   Yes Historical Provider, MD   nystatin (MYCOSTATIN) 298964 UNIT/GM powder Apply 2 times daily for 10 days.  8/31/21  Yes Justin Antonio MD   diclofenac sodium (VOLTAREN) 1 % GEL  2/3/20  Yes Historical Provider, MD   buPROPion (WELLBUTRIN XL) 300 MG extended release tablet TAKE 1 TABLET BY MOUTH  EVERY MORNING 11/19/19  Yes Tamara Mancilla MD   spironolactone (ALDACTONE) 50 MG tablet TAKE 1 TABLET BY MOUTH TWO  TIMES DAILY 10/28/19  Yes Tamara Mancilla MD   omeprazole (PRILOSEC) 20 MG delayed release capsule TAKE 1 CAPSULE BY MOUTH  DAILY 7/17/19  Yes Tamara Mancilla MD   simvastatin (ZOCOR) 40 MG tablet Take 1 tablet by mouth  nightly 4/23/19  Yes Tamara Mancilla MD   Multiple Vitamin (ONE-A-DAY MENS PO) Take by mouth daily   Yes Historical Provider, MD albuterol sulfate  (90 Base) MCG/ACT inhaler Inhale 2 puffs into the lungs every 6 hours as needed for Shortness of Breath 5/24/18  Yes MD Murray Ying Natural Products (GLUCOSAMINE CHONDROITIN ADV) TABS Take by mouth   Yes Historical Provider, MD   fluticasone (FLONASE) 50 MCG/ACT SUSP 1 spray by Nasal route 2 times daily  Patient taking differently: 1 spray by Nasal route as needed  7/12/15  Yes Gela Hernandez MD   CRANBERRY 1,680 mg by Does not apply route. Yes Historical Provider, MD   FISH OIL Take 1,000 mg by mouth daily. Please get dosage   Yes Historical Provider, MD   VITAMIN D PO Take 2,000 Int'l Units by mouth Please get dosage     Yes Historical Provider, MD   aspirin 81 MG tablet Take 81 mg by mouth daily. Yes Historical Provider, MD   Elastic Bandages & Supports (V-2 HIGH COMPRESSION HOSE) MISC 30-40mmHg pressure stockings, knee high. Wear 12 hours while awake and remove at bedtime 3/22/18   Tamara Romero MD   Blood Glucose Monitoring Suppl (Telnexus CONTOUR MONITOR) W/DEVICE KIT use twice a day 4/17/17   Historical MD Rodolfo   MICROLET LANCETS MISC use twice a day 4/17/17   Historical Provider, MD   glucose blood VI test strips (BL TEST STRIP PACK) strip 1 each by Does not apply route 2 times daily. As needed. 5/16/14   Neel Gutierres MD   nitroGLYCERIN (NITROSTAT) 0.4 MG SL tablet Place 0.4 mg under the tongue every 5 minutes as needed. Historical Provider, MD       ALLERGIES      Patient has no known allergies. REVIEW OF SYSTEMS     Review of Systems   Constitutional: Positive for chills, fatigue and fever. Negative for diaphoresis. HENT: Negative for congestion and sore throat. Respiratory: Positive for cough and shortness of breath. Negative for wheezing. Cardiovascular: Negative for chest pain, palpitations and leg swelling. Gastrointestinal: Positive for diarrhea, nausea and vomiting. Negative for abdominal pain and constipation. place, and time. Motor: No seizure activity. Coordination: Coordination normal.   Psychiatric:         Behavior: Behavior normal.         Thought Content: Thought content normal.       DIAGNOSTICS      EKG:   EKG 12 Lead [[id number]]    Collected: 10/01/21 2323    Updated: 10/01/21 2326     Ventricular Rate 65 BPM    Atrial Rate 65 BPM    P-R Interval 180 ms    QRS Duration 98 ms    Q-T Interval 396 ms    QTc Calculation (Bazett) 411 ms    P Axis 62 degrees    R Axis -48 degrees    T Axis 69 degrees   Narrative:     Normal sinus rhythm with sinus arrhythmia   Left axis deviation   Abnormal ECG   No previous ECGs available                                                                                                                Labs:  CBC:   Recent Labs     10/01/21  2253   WBC 15.1*   HGB 9.3*        BMP:    Recent Labs     10/01/21  2253   *   K 3.4*   CL 98   CO2 19*   BUN 20   CREATININE 1.32*   GLUCOSE 229*     S. Calcium:  Recent Labs     10/01/21  2253   CALCIUM 8.8     S. Ionized Calcium:No results for input(s): IONCA in the last 72 hours. S. Magnesium:  Recent Labs     10/01/21  2253   MG 1.8     S. Phosphorus:No results for input(s): PHOS in the last 72 hours. S. Glucose:No results for input(s): POCGLU in the last 72 hours. Glycosylated hemoglobin A1C:   Lab Results   Component Value Date    LABA1C 8.0 07/23/2019     Hepatic:   Recent Labs     10/01/21  2253   AST 21   ALT 22   ALKPHOS 100     CARDIAC ENZY:   Recent Labs     10/01/21  2253 10/02/21  0015   TROPHS 21 20     INR: No results for input(s): INR in the last 72 hours. BNP:   Recent Labs     10/01/21  2253   PROBNP 678*      ABGs: No results for input(s): PH, PCO2, PO2, HCO3, O2SAT in the last 72 hours. Lipids: No results for input(s): CHOL, TRIG, HDL, LDLCALC in the last 72 hours. Invalid input(s): LDL  Pancreatic functions:  Recent Labs     10/01/21  2253   LIPASE 47     S.  LacticAcid: No results for input(s): LACTA in the last 72 hours. Thyroid functions:   Lab Results   Component Value Date    TSH 2.09 11/12/2019      U/A:  Recent Labs     10/01/21  2314   COLORU Yellow   WBCUA 20 TO 50   RBCUA 2 TO 5   MUCUS NOT REPORTED   BACTERIA FEW*   SPECGRAV 1.028   LEUKOCYTESUR MOD*   GLUCOSEU 3+*   AMORPHOUS NOT REPORTED       Imaging/Diagonstics:     US RENAL LIMITED    Result Date: 9/24/2021  EXAMINATION: ULTRASOUND OF THE KIDNEYS 9/24/2021 3:03 pm COMPARISON: CT 10/30/2018 HISTORY: ORDERING SYSTEM PROVIDED HISTORY: Frequent UTI FINDINGS: Difficult exam due to patient body habitus. Left kidney is not well visualized. The right kidney measures 12.1 x 6.4 x 6.1 cm. Cortical thickness and echogenicity appear intact. Patient has a known atrophic left kidney which was not adequately visualized. Kidneys demonstrate normal cortical echogenicity. No hydronephrosis or intrarenal stones. No focal lesions. Increased echogenicity of the liver suggests hepatic steatosis. Unremarkable right kidney. Known atrophic left kidney was unable to be visualized by ultrasound. XR CHEST PORTABLE    Result Date: 10/1/2021  EXAMINATION: ONE XRAY VIEW OF THE CHEST 10/1/2021 10:23 pm COMPARISON: November 12, 2019 HISTORY: ORDERING SYSTEM PROVIDED HISTORY: cough, SOB TECHNOLOGIST PROVIDED HISTORY: Covid rule out cough, SOB Reason for Exam: Cough, shortness of breath, diarrhea, headache, elevated blood sugar, left lower leg redness and swelling Acuity: Acute Type of Exam: Initial Additional signs and symptoms: Cough, shortness of breath, diarrhea, headache, elevated blood sugar, left lower leg redness and swelling Relevant Medical/Surgical History: Cough, shortness of breath, diarrhea, headache, elevated blood sugar, left lower leg redness and swelling FINDINGS: The lungs are without acute focal process. There is no effusion or pneumothorax. The cardiomediastinal silhouette is without acute process.  The osseous structures are without acute process. No acute process. ASSESSMENT  and  PLAN     Principal Problem:    Cellulitis of left lower extremity  Active Problems:    Sleep apnea    Diabetes mellitus ([address]    Complicated UTI (urinary tract infection)  Resolved Problems:    * No resolved hospital problems. *    Plan:    Cellulitis of Left lower extremity  -IV Vancomycin initiated in ED  --Continue on admission to unit  -Elevate affected limb  -IV Normal Saline  -Pain control  -Rapid Covid swab negative in ED  -Cannot rule out DVT  --D-dimer 1.05  ---Patient unable to have CT chest due to body habitus  -Venous Dopplers ordered for a.m.  -Lovenox 1 mg/kg until DVT ruled out    Complicated UTI  -History of recurrent UTI  --Scheduled for Cystoscopy on 10/5  -Rocephin initiated in ED; continue  -Urine culture pending    Diabetes Mellitus  -hold oral hypoglycemics/metformin for now  -POCT ac and hs with sliding scale coverage      Consultations:     PHARMACY TO DOSE VANCOMYCIN  IP CONSULT TO PRIMARY CARE PROVIDER      VALERIE Sam - CNP   10/2/2021  2:54 AM    [facility]: ROLDAN LÓPEZ  [address], [address] [place] [address]. Phone [phone]     Attending Physician Statement  I have discussed the care of Lisa Fry and I have examined the patient myselft and taken ros and hpi , including pertinent history and exam findings,  with the resident. I have reviewed the key elements of all parts of the encounter with the resident. I agree with the assessment, plan and orders as documented by the resident.   71-year-old morbidly obese gentleman BMI 62.14 weighs 385 pounds admitted with a left lower extremity pain and redness with fever hot to touch extension about ankle to below knee cellulitis  History of uncontrolled diabetes mellitus  Sleep apnea on CPAP  History of reconstructive urethral surgery with recurrent UTIs  Dysuria increased frequency IV antibiotics as above urine cultures blood cultures  Acute kidney injury creatinine 1.32 baseline is 0.9  Patient has acute kidney injury with history of ureteral reconstructive surgery as a child     Electronically signed by Lucinda Raphael MD

## 2021-10-02 NOTE — ED TRIAGE NOTES
Mode of arrival (squad #, walk in, police, etc) : Walk in         Chief complaint(s): SOB, fever, NV, Chills, chest pain, cellulitis         Arrival Note (brief scenario, treatment PTA, etc). : Pt states Wednesday fever, NV, chills, SOB, chest pain. Pt also states cellulitis x2 days. Pt RT leg is reddened, swollen, and warm to touch. Pt is A&Ox4, in no acute distress, respirations even and unlabored, ambulatory with steady gait. C= \"Have you ever felt that you should Cut down on your drinking? \"  No  A= \"Have people Annoyed you by criticizing your drinking? \"  No  G= \"Have you ever felt bad or Guilty about your drinking? \"  No  E= \"Have you ever had a drink as an Eye-opener first thing in the morning to steady your nerves or to help a hangover? \"  No      Deferred []      Reason for deferring: N/A    *If yes to two or more: probable alcohol abuse. *

## 2021-10-02 NOTE — PROGRESS NOTES
Vancomycin Dosing by Pharmacy - Initial Note   TODAY'S DATE:  10/2/2021  Patient name, age:  Alana Dandy, 49 y.o. Indication:  UTI/SSTI  Additional antimicrobials:  Rocephin    Allergies:  Patient has no known allergies. Actual Weight:    Wt Readings from Last 1 Encounters:   10/01/21 (!) 385 lb (174.6 kg)     Labs/Ancillary Data  Estimated Creatinine Clearance: 102 mL/min (A) (based on SCr of 1.32 mg/dL (H)). Recent Labs     10/01/21  2253   CREATININE 1.32*   BUN 20   WBC 15.1*     No results found for: PROCAL    Intake/Output Summary (Last 24 hours) at 10/2/2021 0241  Last data filed at 10/2/2021 0215  Gross per 24 hour   Intake 50 ml   Output --   Net 50 ml     Temp: 97.1    Recent vancomycin administrations                     vancomycin (VANCOCIN) 2,500 mg in dextrose 5 % 500 mL IVPB (mg) 2,500 mg New Bag 10/02/21 5386                  Culture Date / Source  /  Results  10/1/2021   urine    MRSA Nasal Swab: N/A. Non-respiratory infection. Cloteal Patel PLAN   Initial loading dose of 25mg/kg (max of 2,500 mg) = 2500  mg  x 1, then  vancomycin 1250 mg IV every 18 hours. Ensured BUN/sCr ordered at baseline and every 48 hours x at least 3 levels, then at least weekly. Vancomycin level ordered for 10/3/2021 @ 0600. Vancomycin Target Concentration Parameters  Treatment  Population Target AUC/RENEA Target Trough   Invasive MRSA Infection (bacteremia, pneumonia, meningitis, endocarditis, osteomyelitis)  Sepsis (undifferentiated) 400-600 N/A   Infection due to non-MRSA pathogen  Empiric treatment of non-invasive MRSA infection  (SSTI, UTI) <500 10-15 mg/L   CrCl < 29 mL/min  Rapidly fluctuating serum creatinine   RYAN N/A < 15 mg/L     Renal replacement therapy is dosed by levels, per hospital protocol. Abbreviations  * Pauc: probability that AUC is >400 (efficacy); Pconc: probability that Ctrough is above 20 ?g/mL (toxicity);  Tox: Probability of nephrotoxicity, based on Brittnee et al. Forrest Shiings Infect Dis

## 2021-10-02 NOTE — ED NOTES
Report given to [address] RN from ED. Report method in person   The following was reviewed with receiving RN:   Current vital signs:  /68   Pulse 73   Temp 97.1 °F (36.2 °C) (Temporal)   Resp 18   Ht 5' 6\" (1.676 m)   Wt (!) 385 lb (174.6 kg)   SpO2 96%   BMI 62.14 kg/m²                      Any medication or safety alerts were reviewed. Any pending diagnostics and notifications were also reviewed, as well as any safety concerns or issues, abnormal labs, abnormal imaging, and abnormal assessment findings. Questions were answered.             Annabel Ivy RN  10/02/21 0006

## 2021-10-02 NOTE — PROGRESS NOTES
Patient resting comfortably in bed. No signs of distress. Patient states pain is at a comfortable level at this time. Will continue to monitor.

## 2021-10-02 NOTE — ED PROVIDER NOTES
[facility]  ED  Emergency Department Encounter  Emergency Medicine Resident     Pt Name: Davina Coto  MRN: [id number]  [place] 1970  Date of evaluation: 10/1/21  PCP:  Nadine Katz       Chief Complaint   Patient presents with    Cellulitis    Shortness of Breath    Chest Pain       HISTORY OFPRESENT ILLNESS  (Location/Symptom, Timing/Onset, Context/Setting, Quality, Duration, Modifying Factors,Severity.)      Davina Coto is a 49 y.o. male with past medical history of asthma, diabetes mellitus, HTN, hyperlipidemia,  and sleep apnea who presents with multiple complaints including flulike symptoms, chest pain, shortness of breath, cellulitis to the left lower extremity and urinary symptoms. His flulike symptoms include chills, headache, nausea, vomiting, diarrhea and fever of 101 °F which began over the past few days. Patient reports he then noticed erythema, edema, warmth and increased pain to the left lower extremity yesterday. Patient states he has a history of cellulitis and feels like his symptoms today are similar. Patient also reports that his blood sugars have been running extremely high for him in the upper 200s. Patient states that his blood sugars are usually controlled around 120. He takes Metformin for his diabetes. He does not take any insulin. Patient is also reporting dysuria and urinary frequency which started around the same time. Patient reports that he has been having recurrent UTIs over the past few months for which she has been following with urology. He has been on multiple antibiotics and as soon as he finishes them, his UTIs return. Patient states that he has a cystoscopy scheduled 10/5 to determine the cause of his frequent UTIs. He did have a surgery to repair left kidney as a child and patient thinks this may be what is causing his symptoms.       PAST MEDICAL / SURGICAL / SOCIAL / FAMILY HISTORY      has a past medical history of Acute pyelonephritis, Asthma, Diabetes, GERD (gastroesophageal reflux disease), Hyperlipidemia, Liver disease, MVP (mitral valve prolapse), Screening PSA (prostate specific antigen), and Unspecified sleep apnea. has a past surgical history that includes Anterior cruciate ligament repair (2/2009) and Tonsillectomy (2005). Social:  reports that he quit smoking about 6 years ago. His smoking use included cigarettes. He smoked 0.00 packs per day for 1.00 year. He has never used smokeless tobacco. He reports current alcohol use. He reports that he does not use drugs. Family Hx:   Family History   Adopted: Yes        Allergies:  Patient has no known allergies. Home Medications:  Prior to Admission medications    Medication Sig Start Date End Date Taking? Authorizing Provider   cetirizine (ZYRTEC) 10 MG tablet Take 10 mg by mouth daily   Yes Historical Provider, MD   empagliflozin (JARDIANCE) 25 MG tablet Take 25 mg by mouth daily   Yes Historical Provider, MD   alogliptin (NESINA) 25 MG TABS tablet Take 25 mg by mouth daily   Yes Historical Provider, MD   metFORMIN (GLUCOPHAGE) 1000 MG tablet Take 1,000 mg by mouth 2 times daily (with meals)   Yes Historical Provider, MD   nystatin (MYCOSTATIN) 685243 UNIT/GM powder Apply 2 times daily for 10 days.  8/31/21  Yes Justin Antonio MD   diclofenac sodium (VOLTAREN) 1 % GEL  2/3/20  Yes Historical Provider, MD   buPROPion (WELLBUTRIN XL) 300 MG extended release tablet TAKE 1 TABLET BY MOUTH  EVERY MORNING 11/19/19  Yes Tamara Sandra MD   spironolactone (ALDACTONE) 50 MG tablet TAKE 1 TABLET BY MOUTH TWO  TIMES DAILY 10/28/19  Yes Tamara Sandra MD   omeprazole (PRILOSEC) 20 MG delayed release capsule TAKE 1 CAPSULE BY MOUTH  DAILY 7/17/19  Yes Tamara Sandra MD   simvastatin (ZOCOR) 40 MG tablet Take 1 tablet by mouth  nightly 4/23/19  Yes Tamara Sandra MD   Multiple Vitamin (ONE-A-DAY MENS PO) Take by mouth daily   Yes Historical Provider, MD albuterol sulfate  (90 Base) MCG/ACT inhaler Inhale 2 puffs into the lungs every 6 hours as needed for Shortness of Breath 5/24/18  Yes Tamara Duckworth MD   Misc Natural Products (GLUCOSAMINE CHONDROITIN ADV) TABS Take by mouth   Yes Historical Provider, MD   fluticasone (FLONASE) 50 MCG/ACT SUSP 1 spray by Nasal route 2 times daily  Patient taking differently: 1 spray by Nasal route as needed  7/12/15  Yes Cynthia Ngo MD   CRANBERRY 1,680 mg by Does not apply route. Yes Historical Provider, MD   FISH OIL Take 1,000 mg by mouth daily. Please get dosage   Yes Historical Provider, MD   VITAMIN D PO Take 2,000 Int'l Units by mouth Please get dosage     Yes Historical Provider, MD   aspirin 81 MG tablet Take 81 mg by mouth daily. Yes Historical Provider, MD   Elastic Bandages & Supports (V-2 HIGH COMPRESSION HOSE) MISC 30-40mmHg pressure stockings, knee high. Wear 12 hours while awake and remove at bedtime 3/22/18   Tamara Duckworth MD   Blood Glucose Monitoring Suppl (RFMarq CONTOUR MONITOR) W/DEVICE KIT use twice a day 4/17/17   Historical Provider, MD   MICROLET LANCETS MISC use twice a day 4/17/17   Historical Provider, MD   glucose blood VI test strips (BL TEST STRIP PACK) strip 1 each by Does not apply route 2 times daily. As needed. 5/16/14   Neel Gutierres MD   nitroGLYCERIN (NITROSTAT) 0.4 MG SL tablet Place 0.4 mg under the tongue every 5 minutes as needed. Historical Provider, MD       REVIEW OFSYSTEMS    (2-9 systems for level 4, 10 or more for level 5)      Review of Systems   Constitutional: Positive for activity change, appetite change and fever. Negative for chills. HENT: Negative for congestion, rhinorrhea and sore throat. Eyes: Negative for visual disturbance. Respiratory: Positive for cough and shortness of breath. Cardiovascular: Positive for chest pain and leg swelling. Gastrointestinal: Positive for abdominal pain, nausea and vomiting.  Negative for constipation and diarrhea. Genitourinary: Positive for dysuria. Negative for decreased urine volume, difficulty urinating, flank pain, frequency and hematuria. Musculoskeletal: Positive for myalgias. Negative for arthralgias, back pain and neck pain. Skin: Positive for pallor. Negative for rash and wound. Neurological: Negative for dizziness, weakness, numbness and headaches. Psychiatric/Behavioral: Negative for confusion. All other systems reviewed and are negative. PHYSICAL EXAM   (up to 7 for level 4, 8 or more forlevel 5)      INITIAL VITALS:   Vitals:    10/02/21 0030 10/02/21 0100 10/02/21 0130 10/02/21 0200   BP:       Pulse: 75 72 74 79   Resp: 12 10 16 17   Temp:       TempSrc:       SpO2: 98% 96% 97% 96%   Weight:       Height:            Physical Exam  Vitals and nursing note reviewed. Constitutional:       General: He is not in acute distress. Appearance: He is obese. He is not toxic-appearing. Comments: Adult male lying in stretcher no acute distress. He speaks in full sentences and answers questions appropriately. HENT:      Head: Normocephalic and atraumatic. Nose: Nose normal.      Mouth/Throat:      Mouth: Mucous membranes are moist.      Pharynx: Oropharynx is clear. Eyes:      Conjunctiva/sclera: Conjunctivae normal.      Pupils: Pupils are equal, round, and reactive to light. Cardiovascular:      Rate and Rhythm: Normal rate and regular rhythm. Heart sounds: No murmur heard. No friction rub. No gallop. Pulmonary:      Effort: Pulmonary effort is normal. No respiratory distress. Breath sounds: Normal breath sounds. No stridor. No wheezing, rhonchi or rales. Abdominal:      General: There is no distension. Palpations: Abdomen is soft. Tenderness: There is abdominal tenderness. There is no guarding.       Comments: Mild suprapubic tenderness to palpation without rebound or guarding   Musculoskeletal:         General: Swelling and tenderness present. No deformity or signs of injury. Cervical back: Neck supple. No rigidity. Comments: Edema and circumferential erythema to the left lower leg, most significant over the distal tibia but extending into the left foot with associated tenderness to palpation. 2+ DP pulses bilaterally. Skin:     General: Skin is warm and dry. Capillary Refill: Capillary refill takes less than 2 seconds. Findings: Erythema present. No rash. Neurological:      General: No focal deficit present. Mental Status: He is alert and oriented to person, place, and time. Psychiatric:         Mood and Affect: Mood normal.         Behavior: Behavior normal.         DIFFERENTIAL  DIAGNOSIS     DDX: COVID-19, influenza, other viral illness, ACS, MI, pneumonia, pleural effusion, pulmonary edema, electrolyte abnormality, dehydration, hyperglycemia, DKA, urinary tract infection, pyelonephritis, cellulitis, DVT    Initial MDM/Plan: 49 y.o. male  with past medical history of asthma, diabetes mellitus, HTN, hyperlipidemia,  and sleep apnea who presents with multiple complaints including flulike symptoms, chest pain, shortness of breath, cellulitis to the left lower extremity and urinary symptoms. On physical exam, patient is awake, alert, nontoxic-appearing. His vitals are unremarkable. He is morbidly obese. He does have circumferential erythema and edema to the left lower extremity suprapubic tenderness. Exam is otherwise benign. Suspect viral illness such as Covid, the patient did receive his Covid vaccines. DKA and UTI also considered. For his lower extremity, DVT or cellulitis is considered. Will obtain labs, urinalysis, EKG and chest x-ray to evaluate. Will obtain D-dimer to evaluate for DVT, however patient's full exam is more consistent with cellulitis. Will treat symptomatically with IV fluids, analgesia and antiemetics and reassess.     DIAGNOSTIC RESULTS / EMERGENCYDEPARTMENT COURSE / MDM American >60 >60 mL/min    GFR Comment          GFR Staging NOT REPORTED    Troponin   Result Value Ref Range    Troponin, High Sensitivity 21 0 - 22 ng/L    Troponin T NOT REPORTED <0.03 ng/mL    Troponin Interp NOT REPORTED    Brain Natriuretic Peptide   Result Value Ref Range    Pro- (H) <300 pg/mL    BNP Interpretation Pro-BNP Reference Range:    Lipase   Result Value Ref Range    Lipase 47 13 - 60 U/L   D-Dimer, Quantitative   Result Value Ref Range    D-Dimer, Quant 1.05 (H) 0.00 - 0.59 mg/L FEU   Urinalysis with Microscopic   Result Value Ref Range    Color, UA Yellow Yellow    Turbidity UA Cloudy (A) Clear    Glucose, Ur 3+ (A) NEGATIVE    Bilirubin Urine NEGATIVE NEGATIVE    Ketones, Urine NEGATIVE NEGATIVE    Specific Gravity, UA 1.028 1.000 - 1.030    Urine Hgb SMALL (A) NEGATIVE    pH, UA 5.5 5.0 - 8.0    Protein, UA 1+ (A) NEGATIVE    Urobilinogen, Urine Normal Normal    Nitrite, Urine NEGATIVE NEGATIVE    Leukocyte Esterase, Urine MOD (A) NEGATIVE    Urinalysis Comments NOT REPORTED     -          WBC, UA 20 TO 50 /HPF    RBC, UA 2 TO 5 /HPF    Casts UA NOT REPORTED /LPF    Crystals, UA NOT REPORTED None /HPF    Epithelial Cells UA 5 TO 10 /HPF    Renal Epithelial, UA NOT REPORTED 0 /HPF    Bacteria, UA FEW (A) None    Mucus, UA NOT REPORTED None    Trichomonas, UA NOT REPORTED None    Amorphous, UA NOT REPORTED None    Other Observations UA NOT REPORTED NOT REQ.     Yeast, UA NOT REPORTED None   C-Reactive Protein   Result Value Ref Range    .1 (H) 0.0 - 5.0 mg/L   Sedimentation Rate   Result Value Ref Range    Sed Rate 46 (H) 0 - 20 mm   Magnesium   Result Value Ref Range    Magnesium 1.8 1.6 - 2.6 mg/dL   Troponin   Result Value Ref Range    Troponin, High Sensitivity 20 0 - 22 ng/L    Troponin T NOT REPORTED <0.03 ng/mL    Troponin Interp NOT REPORTED    EKG 12 Lead   Result Value Ref Range    Ventricular Rate 65 BPM    Atrial Rate 65 BPM    P-R Interval 180 ms    QRS Duration 98 ms Q-T Interval 396 ms    QTc Calculation (Bazett) 411 ms    P Axis 62 degrees    R Axis -48 degrees    T Axis 69 degrees         RADIOLOGY:  XR CHEST PORTABLE    Result Date: 10/1/2021  EXAMINATION: ONE XRAY VIEW OF THE CHEST 10/1/2021 10:23 pm COMPARISON: November 12, 2019 HISTORY: ORDERING SYSTEM PROVIDED HISTORY: cough, SOB TECHNOLOGIST PROVIDED HISTORY: Covid rule out cough, SOB Reason for Exam: Cough, shortness of breath, diarrhea, headache, elevated blood sugar, left lower leg redness and swelling Acuity: Acute Type of Exam: Initial Additional signs and symptoms: Cough, shortness of breath, diarrhea, headache, elevated blood sugar, left lower leg redness and swelling Relevant Medical/Surgical History: Cough, shortness of breath, diarrhea, headache, elevated blood sugar, left lower leg redness and swelling FINDINGS: The lungs are without acute focal process. There is no effusion or pneumothorax. The cardiomediastinal silhouette is without acute process. The osseous structures are without acute process. No acute process.        EKG  Normal sinus rhythm, rate: 65  LA: 180  QRS: 98  QT/QTc: 96/411  No ST elevation or depression  No T wave abnormality  Left axis deviation    All EKG's are interpreted by the Emergency Department Physician who either signs or Co-signs this chart in the absence of a cardiologist.      MEDICATIONS ORDERED:  Orders Placed This Encounter   Medications    0.9 % sodium chloride bolus    ondansetron (ZOFRAN) injection 4 mg    ketorolac (TORADOL) injection 15 mg    cefTRIAXone (ROCEPHIN) 1000 mg IVPB in 50 mL D5W minibag     Order Specific Question:   Antimicrobial Indications     Answer:   Urinary Tract Infection     Order Specific Question:   Antimicrobial Indications     Answer:   Skin and Soft Tissue Infection    enoxaparin (LOVENOX) injection 180 mg    vancomycin (VANCOCIN) intermittent dosing (placeholder)     Order Specific Question:   Antimicrobial Indications     Answer: Skin and Soft Tissue Infection     Order Specific Question:   Antimicrobial Indications     Answer:   Urinary Tract Infection    vancomycin (VANCOCIN) 2,500 mg in dextrose 5 % 500 mL IVPB     Order Specific Question:   Antimicrobial Indications     Answer:   Skin and Soft Tissue Infection     Order Specific Question:   Antimicrobial Indications     Answer:   Urinary Tract Infection    vancomycin (VANCOCIN) 1,250 mg in dextrose 5 % 250 mL IVPB (ADDAVIAL)     Order Specific Question:   Antimicrobial Indications     Answer:   Skin and Soft Tissue Infection     Order Specific Question:   Antimicrobial Indications     Answer:   Urinary Tract Infection       PROCEDURES:  None      CONSULTS:  PHARMACY TO DOSE VANCOMYCIN  IP CONSULT TO PRIMARY CARE PROVIDER      EMERGENCY DEPARTMENT COURSE:  ED Course as of Oct 02 0313   Fri Oct 01, 2021   2318 D-Dimer, Quant(!): 1.05  Suspect this is a nonspecific elevation or due to his infection and not a true DVT, however patient will require DVT testing which we are unable to get at this time of night. If patient is admitted, will order study for the morning. If patient is stable enough to be discharged, will provide him with outpatient care for venous duplex ultrasound. [KA]     2318 WBC(!): 15.1 [KA]     2318 SARS-CoV-2, Rapid: Not Detected [KA]     2340  Troponin, High Sensitivity: 20   Slightly elevated patient's baseline but still high normal.  Will repeat. [KA]     2340 Urinalysis with Microscopic(!):    Color, UA Yellow   Turbidity UA Cloudy(!)   Glucose, UA 3+(!)   Urine Hgb SMALL(!)   Protein, UA 1+(!)   Nitrite, Urine NEGATIVE   Leukocyte Esterase, Urine MOD(!)       WBC, UA 20 TO 50   RBC, UA 2 TO 5   Epithelial Cells, UA 5 TO 10    [KA]     Sat Oct 02, 2021   0000 Patient's work-up is remarkable for urinary tract infection. Will send culture. His CMP also shows hyperglycemia, likely pseudohyponatremia, hypokalemia and elevated creatinine.   Patient has received a liter of fluids. Will replace the potassium. Leonidas Randolph Given patient's urinary tract infection, cellulitis and concern for DVT, as well as patient not feeling clinically improved after treatment, will plan for admission for IV antibiotics and DVT ultrasound in the morning. We will start the patient on Rocephin and vancomycin. We will also give him a dose of therapeutic Lovenox. Patient was updated and is comfortable with this plan. [KA]     0020 Discussed the patient with internal medicine who accepts him for admission    [KA]      ED Course User Index  [KA] Rachael Prescott DO          FINAL IMPRESSION      1. Acute cystitis without hematuria    2. Cellulitis of left lower extremity          DISPOSITION / PLAN     DISPOSITION Admitted 10/01/2021 11:56:50 PM      PATIENT REFERRED TO:  No follow-up provider specified.     DISCHARGE MEDICATIONS:  New Prescriptions    No medications on file       Rachael Prescott DO  Emergency Medicine Resident    (Please note that portions of this note were completed with a voice recognition program.Efforts were made to edit the dictations but occasionally words are mis-transcribed.)        Rachael Prescott DO  Resident  10/02/21 1937

## 2021-10-03 LAB
ANION GAP SERPL CALCULATED.3IONS-SCNC: 11 MMOL/L (ref 9–17)
BUN BLDV-MCNC: 18 MG/DL (ref 6–20)
BUN/CREAT BLD: ABNORMAL (ref 9–20)
CALCIUM SERPL-MCNC: 8.4 MG/DL (ref 8.6–10.4)
CHLORIDE BLD-SCNC: 104 MMOL/L (ref 98–107)
CO2: 18 MMOL/L (ref 20–31)
CREAT SERPL-MCNC: 1.24 MG/DL (ref 0.7–1.2)
CULTURE: ABNORMAL
EKG ATRIAL RATE: 65 BPM
EKG P AXIS: 62 DEGREES
EKG P-R INTERVAL: 180 MS
EKG Q-T INTERVAL: 396 MS
EKG QRS DURATION: 98 MS
EKG QTC CALCULATION (BAZETT): 411 MS
EKG R AXIS: -48 DEGREES
EKG T AXIS: 69 DEGREES
EKG VENTRICULAR RATE: 65 BPM
GFR AFRICAN AMERICAN: >60 ML/MIN
GFR NON-AFRICAN AMERICAN: >60 ML/MIN
GFR SERPL CREATININE-BSD FRML MDRD: ABNORMAL ML/MIN/{1.73_M2}
GFR SERPL CREATININE-BSD FRML MDRD: ABNORMAL ML/MIN/{1.73_M2}
GLUCOSE BLD-MCNC: 136 MG/DL (ref 75–110)
GLUCOSE BLD-MCNC: 159 MG/DL (ref 75–110)
GLUCOSE BLD-MCNC: 211 MG/DL (ref 70–99)
GLUCOSE BLD-MCNC: 212 MG/DL (ref 75–110)
HCT VFR BLD CALC: 27.7 % (ref 41–53)
HEMOGLOBIN: 8.9 G/DL (ref 13.5–17.5)
Lab: ABNORMAL
MCH RBC QN AUTO: 25.2 PG (ref 26–34)
MCHC RBC AUTO-ENTMCNC: 32 G/DL (ref 31–37)
MCV RBC AUTO: 78.7 FL (ref 80–100)
NRBC AUTOMATED: ABNORMAL PER 100 WBC
PDW BLD-RTO: 15.6 % (ref 11.5–14.9)
PLATELET # BLD: 279 K/UL (ref 150–450)
PMV BLD AUTO: 7.3 FL (ref 6–12)
POTASSIUM SERPL-SCNC: 4.1 MMOL/L (ref 3.7–5.3)
RBC # BLD: 3.51 M/UL (ref 4.5–5.9)
SODIUM BLD-SCNC: 133 MMOL/L (ref 135–144)
SPECIMEN DESCRIPTION: ABNORMAL
VANCOMYCIN RANDOM DATE LAST DOSE: NORMAL
VANCOMYCIN RANDOM DOSE AMOUNT: NORMAL
VANCOMYCIN RANDOM TIME LAST DOSE: 1952
VANCOMYCIN RANDOM: 19.3 UG/ML
WBC # BLD: 9.5 K/UL (ref 3.5–11)

## 2021-10-03 PROCEDURE — 94761 N-INVAS EAR/PLS OXIMETRY MLT: CPT

## 2021-10-03 PROCEDURE — 80202 ASSAY OF VANCOMYCIN: CPT

## 2021-10-03 PROCEDURE — 6370000000 HC RX 637 (ALT 250 FOR IP): Performed by: NURSE PRACTITIONER

## 2021-10-03 PROCEDURE — 6360000002 HC RX W HCPCS: Performed by: NURSE PRACTITIONER

## 2021-10-03 PROCEDURE — 85027 COMPLETE CBC AUTOMATED: CPT

## 2021-10-03 PROCEDURE — 36415 COLL VENOUS BLD VENIPUNCTURE: CPT

## 2021-10-03 PROCEDURE — 6360000002 HC RX W HCPCS: Performed by: INTERNAL MEDICINE

## 2021-10-03 PROCEDURE — 93010 ELECTROCARDIOGRAM REPORT: CPT | Performed by: INTERNAL MEDICINE

## 2021-10-03 PROCEDURE — 83036 HEMOGLOBIN GLYCOSYLATED A1C: CPT

## 2021-10-03 PROCEDURE — 2060000000 HC ICU INTERMEDIATE R&B

## 2021-10-03 PROCEDURE — 80048 BASIC METABOLIC PNL TOTAL CA: CPT

## 2021-10-03 PROCEDURE — 99233 SBSQ HOSP IP/OBS HIGH 50: CPT | Performed by: INTERNAL MEDICINE

## 2021-10-03 PROCEDURE — 82947 ASSAY GLUCOSE BLOOD QUANT: CPT

## 2021-10-03 PROCEDURE — 2580000003 HC RX 258: Performed by: NURSE PRACTITIONER

## 2021-10-03 PROCEDURE — 94660 CPAP INITIATION&MGMT: CPT

## 2021-10-03 RX ORDER — NICOTINE POLACRILEX 4 MG
15 LOZENGE BUCCAL PRN
Status: DISCONTINUED | OUTPATIENT
Start: 2021-10-03 | End: 2021-10-03 | Stop reason: SDUPTHER

## 2021-10-03 RX ORDER — DEXTROSE MONOHYDRATE 50 MG/ML
100 INJECTION, SOLUTION INTRAVENOUS PRN
Status: DISCONTINUED | OUTPATIENT
Start: 2021-10-03 | End: 2021-10-05 | Stop reason: HOSPADM

## 2021-10-03 RX ORDER — DEXTROSE MONOHYDRATE 25 G/50ML
12.5 INJECTION, SOLUTION INTRAVENOUS PRN
Status: DISCONTINUED | OUTPATIENT
Start: 2021-10-03 | End: 2021-10-03 | Stop reason: SDUPTHER

## 2021-10-03 RX ADMIN — KETOROLAC TROMETHAMINE 30 MG: 30 INJECTION, SOLUTION INTRAMUSCULAR; INTRAVENOUS at 15:46

## 2021-10-03 RX ADMIN — SPIRONOLACTONE 50 MG: 25 TABLET, FILM COATED ORAL at 08:31

## 2021-10-03 RX ADMIN — ALOGLIPTIN 25 MG: 25 TABLET, FILM COATED ORAL at 08:30

## 2021-10-03 RX ADMIN — ANTI-FUNGAL POWDER MICONAZOLE NITRATE TALC FREE: 1.42 POWDER TOPICAL at 08:32

## 2021-10-03 RX ADMIN — PANTOPRAZOLE SODIUM 40 MG: 40 TABLET, DELAYED RELEASE ORAL at 06:23

## 2021-10-03 RX ADMIN — INSULIN LISPRO 2 UNITS: 100 INJECTION, SOLUTION INTRAVENOUS; SUBCUTANEOUS at 16:58

## 2021-10-03 RX ADMIN — DICLOFENAC SODIUM 2 G: 10 GEL TOPICAL at 20:31

## 2021-10-03 RX ADMIN — HYDROCODONE BITARTRATE AND ACETAMINOPHEN 1 TABLET: 5; 325 TABLET ORAL at 17:40

## 2021-10-03 RX ADMIN — DICLOFENAC SODIUM 2 G: 10 GEL TOPICAL at 08:32

## 2021-10-03 RX ADMIN — SODIUM CHLORIDE: 9 INJECTION, SOLUTION INTRAVENOUS at 11:39

## 2021-10-03 RX ADMIN — ANTI-FUNGAL POWDER MICONAZOLE NITRATE TALC FREE: 1.42 POWDER TOPICAL at 20:31

## 2021-10-03 RX ADMIN — ATORVASTATIN CALCIUM 20 MG: 20 TABLET, FILM COATED ORAL at 08:31

## 2021-10-03 RX ADMIN — HYDROCODONE BITARTRATE AND ACETAMINOPHEN 1 TABLET: 5; 325 TABLET ORAL at 10:21

## 2021-10-03 RX ADMIN — ENOXAPARIN SODIUM 180 MG: 100 INJECTION SUBCUTANEOUS at 10:22

## 2021-10-03 RX ADMIN — ASPIRIN 81 MG: 81 TABLET, COATED ORAL at 08:31

## 2021-10-03 RX ADMIN — CETIRIZINE HYDROCHLORIDE 10 MG: 10 TABLET, FILM COATED ORAL at 08:31

## 2021-10-03 RX ADMIN — INSULIN LISPRO 4 UNITS: 100 INJECTION, SOLUTION INTRAVENOUS; SUBCUTANEOUS at 08:31

## 2021-10-03 RX ADMIN — SPIRONOLACTONE 50 MG: 25 TABLET, FILM COATED ORAL at 20:29

## 2021-10-03 RX ADMIN — METFORMIN HYDROCHLORIDE 1000 MG: 1000 TABLET ORAL at 08:31

## 2021-10-03 RX ADMIN — CEFTRIAXONE SODIUM 1000 MG: 1 INJECTION, POWDER, FOR SOLUTION INTRAMUSCULAR; INTRAVENOUS at 00:27

## 2021-10-03 RX ADMIN — KETOROLAC TROMETHAMINE 30 MG: 30 INJECTION, SOLUTION INTRAMUSCULAR; INTRAVENOUS at 06:55

## 2021-10-03 RX ADMIN — INSULIN LISPRO 4 UNITS: 100 INJECTION, SOLUTION INTRAVENOUS; SUBCUTANEOUS at 11:38

## 2021-10-03 RX ADMIN — METFORMIN HYDROCHLORIDE 1000 MG: 1000 TABLET ORAL at 16:58

## 2021-10-03 RX ADMIN — BUPROPION HYDROCHLORIDE 300 MG: 300 TABLET, FILM COATED, EXTENDED RELEASE ORAL at 08:31

## 2021-10-03 RX ADMIN — ENOXAPARIN SODIUM 180 MG: 100 INJECTION SUBCUTANEOUS at 20:29

## 2021-10-03 ASSESSMENT — PAIN DESCRIPTION - ORIENTATION: ORIENTATION: LEFT

## 2021-10-03 ASSESSMENT — PAIN DESCRIPTION - LOCATION: LOCATION: LEG

## 2021-10-03 ASSESSMENT — PAIN SCALES - GENERAL
PAINLEVEL_OUTOF10: 0
PAINLEVEL_OUTOF10: 7
PAINLEVEL_OUTOF10: 6
PAINLEVEL_OUTOF10: 5
PAINLEVEL_OUTOF10: 7
PAINLEVEL_OUTOF10: 7
PAINLEVEL_OUTOF10: 9

## 2021-10-03 NOTE — PLAN OF CARE
Problem: Falls - Risk of:  Goal: Will remain free from falls  Description: Will remain free from falls  10/3/2021 1711 by Elzbieta Cerrato RN  Outcome: Met This Shift  Note: Patient is alert and oriented and remains free from falls this shift. Bed is locked and in lowest position. Call light is within reach and patient is using appropriately. RN will continue to monitor. Problem: Pain:  Description: Pain management should include both nonpharmacologic and pharmacologic interventions. Goal: Control of acute pain  Description: Control of acute pain  10/3/2021 1711 by Elzbieta Cerrato RN  Outcome: Met This Shift  Note: Adequate pain control maintained this shift. Medicated per PRN orders. See MAR. RN will continue to monitor.         Problem: Nutrition  Goal: Optimal nutrition therapy  Outcome: Met This Shift

## 2021-10-03 NOTE — PROGRESS NOTES
fluctuating serum creatinine   RYAN N/A < 15 mg/L     Renal replacement therapy is dosed by levels, per hospital protocol. Abbreviations  * Pauc: probability that AUC is >400 (efficacy); Pconc: probability that Ctrough is above 20 ?g/mL (toxicity); Tox: Probability of nephrotoxicity, based on Brittnee et al. Clin Infect Dis 2009. Thank you for the consult. Pharmacy will continue to follow.     Jhon Prieto RPH,PharmD,  10/3/2021, 7:22 AM

## 2021-10-03 NOTE — PLAN OF CARE
Problem: Falls - Risk of:  Goal: Will remain free from falls  Description: Will remain free from falls  Outcome: Ongoing  Note: Patient remained free from falls all throughout shift. Bed locked, side rails up X2, belongings and call light within reach.     Goal: Absence of physical injury  Description: Absence of physical injury  Outcome: Ongoing     Problem: Pain:  Goal: Pain level will decrease  Description: Pain level will decrease  Outcome: Ongoing  Goal: Control of acute pain  Description: Control of acute pain  Outcome: Ongoing  Goal: Control of chronic pain  Description: Control of chronic pain  Outcome: Ongoing

## 2021-10-03 NOTE — CARE COORDINATION
CASE MANAGEMENT NOTE:    Admission Date:  10/1/2021 Alana Dandy is a 49 y.o.  male    Admitted for : Acute cystitis without hematuria [N30.00]  Cellulitis of left lower extremity [T42.686]  Complicated UTI (urinary tract infection) [N39.0]    Met with:  Patient    PCP:  Dr. Heather Driver:  [address]      Is patient alert and oriented at time of discussion:  Yes    Current Residence/ Living Arrangements:  independently at home             Current Services PTA:  No    Does patient go to outpatient dialysis: No  If yes, location and chair time: N/A    Is patient agreeable to VNS: No    Freedom of choice provided:  Yes    List of [address] provided: No    VNS chosen:  No    DME:  none    Home Oxygen: No    Nebulizer: No    CPAP/BIPAP: Yes    Supplier: [employer]    Potential Assistance Needed: No    SNF needed: No    Freedom of choice and list provided: NA    Pharmacy:  [facility]       Does Patient want to use MEDS to BEDS? No    Is patient currently receiving oral anticoagulation therapy? No    Is the Patient an [facility] with Readmission Risk Score greater than 14%? No  If yes, pt needs a follow up appointment made within 7 days. Family Members/Caregivers that pt would like involved in their care:    Yes    If yes, list name here: Wife Caridad Hernadez    Transportation Provider:  Patient             Discharge Plan:  10/3: [employer] - From 1-story home with spouse. Patient is independent and drives. DME - CPAP ([facility]). Declines VNS. On IV rocephin and IV fluids. LLE cellulitis.  //ZIGGY                 Electronically signed by: Ariadna Chatterjee RN on 10/3/2021 at 4:17 PM

## 2021-10-03 NOTE — PROGRESS NOTES
[address] [employer] [facility]    Date:   10/3/2021  Patient name:  Lisa Fry  Date of admission:  10/1/2021  9:08 PM  MRN:   [id number]  YOB: 1970      HPI        Overnight no fever chills pain is improved redness is in the leg is stable no dyspnea or chest pain  REVIEW OF SYSTEMS:    · Cardiovascular: Negative for lightheadedness/orthostatic symptoms ,chest pain, dyspnea on exertion, palpitations or loss of consciousness. · Respiratory: Negative for cough or wheezing, sputum production, hemoptysis, pleuritic pain. · Gastrointestinal: Negative for nausea/vomiting, change in bowel habits, abdominal pain, dysphagia/appetite loss, hematemesis, blood in stools. Left leg pain and swelling redness    OBJECTIVE:    BP (!) 110/45   Pulse 66   Temp 97.3 °F (36.3 °C) (Oral)   Resp 20   Ht 5' 6\" (1.676 m)   Wt (!) 385 lb (174.6 kg)   SpO2 96%   BMI 62.14 kg/m²      · Lungs: clear to auscultation bilaterally,no wheeze. · Heart: regular rate and rhythm, S1, S2 normal, no murmur, click, rub or gallop  · Abdomen: soft, non-tender; bowel sounds normal; no masses,  no organomegaly  · Extremities: left leg extensive cellutls  · Possible dvt  ·   · Neurological:  Reflexes normal and symmetric. Sensation grossly normal  · Skin - no rash, no lump   · Eye- no icterus no redness  · GI-oral mucosa moist,  · Lymphatic system-no lymphadenopathy no splenomegaly  · Mouth- mucous membrane moist  · Head-normocephalic atraumatic  · Neck- supple no carotid bruit,Thyroid not palpable. Past Medical History:   has a past medical history of Acute pyelonephritis, Asthma, Diabetes, GERD (gastroesophageal reflux disease), Hyperlipidemia, Liver disease, MVP (mitral valve prolapse), Screening PSA (prostate specific antigen), and Unspecified sleep apnea.     Past Surgical History:   has a past surgical history that includes Anterior cruciate ligament repair (2/2009) and Tonsillectomy (2005). Home Medications:    Prior to Admission medications    Medication Sig Start Date End Date Taking? Authorizing Provider   cetirizine (ZYRTEC) 10 MG tablet Take 10 mg by mouth daily   Yes Historical Provider, MD   empagliflozin (JARDIANCE) 25 MG tablet Take 25 mg by mouth daily   Yes Historical Provider, MD   alogliptin (NESINA) 25 MG TABS tablet Take 25 mg by mouth daily   Yes Historical Provider, MD   metFORMIN (GLUCOPHAGE) 1000 MG tablet Take 1,000 mg by mouth 2 times daily (with meals)   Yes Historical Provider, MD   nystatin (MYCOSTATIN) 487089 UNIT/GM powder Apply 2 times daily for 10 days. 8/31/21  Yes Abhi Whitt MD   diclofenac sodium (VOLTAREN) 1 % GEL  2/3/20  Yes Historical Provider, MD   buPROPion (WELLBUTRIN XL) 300 MG extended release tablet TAKE 1 TABLET BY MOUTH  EVERY MORNING 11/19/19  Yes Tamara Hollis MD   spironolactone (ALDACTONE) 50 MG tablet TAKE 1 TABLET BY MOUTH TWO  TIMES DAILY 10/28/19  Yes Tamara Hollis MD   omeprazole (PRILOSEC) 20 MG delayed release capsule TAKE 1 CAPSULE BY MOUTH  DAILY 7/17/19  Yes Tamara Hollis MD   simvastatin (ZOCOR) 40 MG tablet Take 1 tablet by mouth  nightly 4/23/19  Yes Tamara Hollis MD   Multiple Vitamin (ONE-A-DAY MENS PO) Take by mouth daily   Yes Historical Provider, MD   albuterol sulfate  (90 Base) MCG/ACT inhaler Inhale 2 puffs into the lungs every 6 hours as needed for Shortness of Breath 5/24/18  Yes Tamara Hollis MD   Misc Natural Products (GLUCOSAMINE CHONDROITIN ADV) TABS Take by mouth   Yes Historical Provider, MD   fluticasone (FLONASE) 50 MCG/ACT SUSP 1 spray by Nasal route 2 times daily  Patient taking differently: 1 spray by Nasal route as needed  7/12/15  Yes Adan Moe MD   CRANBERRY 1,680 mg by Does not apply route. Yes Historical Provider, MD   FISH OIL Take 1,000 mg by mouth daily.  Please get dosage   Yes Historical Provider, MD   VITAMIN D PO Take 2,000 Int'l Units by mouth Please get dosage     Yes Historical Provider, MD   aspirin 81 MG tablet Take 81 mg by mouth daily. Yes Historical Provider, MD   Elastic Bandages & Supports (V-2 HIGH COMPRESSION HOSE) MISC 30-40mmHg pressure stockings, knee high. Wear 12 hours while awake and remove at bedtime 3/22/18   Tamara Arenas MD   Blood Glucose Monitoring Suppl (JAS CONTOUR MONITOR) W/DEVICE KIT use twice a day 4/17/17   Historical Provider, MD   MICROLET LANCETS MISC use twice a day 4/17/17   Historical Provider, MD   glucose blood VI test strips (BL TEST STRIP PACK) strip 1 each by Does not apply route 2 times daily. As needed. 5/16/14   Neel Gutierres MD   nitroGLYCERIN (NITROSTAT) 0.4 MG SL tablet Place 0.4 mg under the tongue every 5 minutes as needed. Historical Provider, MD       Allergies:  Patient has no known allergies. Social History:   reports that he quit smoking about 6 years ago. His smoking use included cigarettes. He smoked 0.00 packs per day for 1.00 year. He has never used smokeless tobacco. He reports current alcohol use. He reports that he does not use drugs. Family History: family history is not on file. He was adopted. Laboratory Testing:  CBC:   Recent Labs     10/03/21  0630   WBC 9.5   HGB 8.9*        BMP:    Recent Labs     10/01/21  2253 10/01/21  2253 10/03/21  0630   *   < > 133*   K 3.4*   < > 4.1   CL 98   < > 104   CO2 19*   < > 18*   BUN 20   < > 18   CREATININE 1.32*  --  1.24*   GLUCOSE 229*   < > 211*    < > = values in this interval not displayed. S. Calcium:  Recent Labs     10/03/21  0630   CALCIUM 8.4*     S. Ionized Calcium:No results for input(s): IONCA in the last 72 hours. S. Magnesium:  Recent Labs     10/01/21  2253   MG 1.8     S. Phosphorus:No results for input(s): PHOS in the last 72 hours.   S. Glucose:  Recent Labs     10/02/21  1238 10/02/21  1545 10/02/21  1951   POCGLU 268* 212* 183*     Glycosylated hemoglobin A1C: No results start full dose Lovenox DVT scan in a.m. If DVT scan is positive patient will need a CT PE when creatinine is better      Sam Grajeda MD, MD NADIR LEVY [address], [address] [place] [address].    Phone [phone]   Fax: [phone]  Answering Service: [phone]

## 2021-10-03 NOTE — PLAN OF CARE
Nutrition Problem #1: Overweight/Obese  Intervention: Food and/or Nutrient Delivery: Continue Current Diet  Nutritional Goals: PO intake % of most meals.

## 2021-10-03 NOTE — PROGRESS NOTES
Comprehensive Nutrition Assessment    Type and Reason for Visit:  Positive Nutrition Screen (weight loss)    Nutrition Recommendations/Plan: Continue diet as ordered. Nutrition Assessment:  Pt went to ED with flulike symptoms, chest pain, SOB, and left lower extremity redness & warmth. Pt is also diabetic on Metformin noting recent blood sugars in 200 range when typically 120 range. Writer asked about weight loss which he said \"I mioght have lost weight since I work outside and when it's hot I don't feel like eating as much. \"    Malnutrition Assessment:  Malnutrition Status:  No malnutrition    Context:  Acute Illness     Findings of the 6 clinical characteristics of malnutrition:  Energy Intake:  No significant decrease in energy intake  Weight Loss:  No significant weight loss     Body Fat Loss:  No significant body fat loss     Muscle Mass Loss:  No significant muscle mass loss    Fluid Accumulation:  1 - Mild Extremities   Strength:  Not Performed    Estimated Daily Nutrient Needs:  Energy (kcal):  1921 kcals based on 11 kcals/kg using adm wt 174.6 kg; Weight Used for Energy Requirements:  Admission     Protein (g):  129 gm protein based on 2 gm/kg using IBW; Weight Used for Protein Requirements:  Ideal          Nutrition Related Findings:  Edema: +1 BLE's      Wounds:   (cellulitis left lower extremity)       Current Nutrition Therapies:    ADULT DIET;  Regular; 4 carb choices (60 gm/meal)    Anthropometric Measures:  · Height: 5' 6\" (167.6 cm)  · Current Body Weight: 385 lb (174.6 kg)   · Admission Body Weight: 385 lb (174.6 kg)    · Ideal Body Weight: 142 lbs; % Ideal Body Weight 271.1 %   · BMI: 62.2  · BMI Categories: Obese Class 3 (BMI 40.0 or greater)       Nutrition Diagnosis:   · Overweight/Obese related to excessive energy intake as evidenced by BMI      Nutrition Interventions:   Food and/or Nutrient Delivery:  Continue Current Diet  Nutrition Education/Counseling:  No recommendation at this time   Coordination of Nutrition Care:  Continue to monitor while inpatient    Goals:  PO intake % of most meals. Nutrition Monitoring and Evaluation:   Food/Nutrient Intake Outcomes:  Food and Nutrient Intake  Physical Signs/Symptoms Outcomes:  Biochemical Data, Fluid Status or Edema, Skin, Weight     Discharge Planning:    Continue current diet     Some areas of assessment may be incomplete due to COVID-19 precautions. Nilam Benavidez R.D., L.CHANO.   Clinical Dietitian  Office: [phone]

## 2021-10-03 NOTE — FLOWSHEET NOTE
10/03/21 1447   Encounter Summary   Services provided to: Patient   Referral/Consult From: Danilo   Continue Visiting   (10/3/21 V)   Volunteer Visit Yes   Complexity of Encounter Low   Length of Encounter 15 minutes   Routine   Type Initial   Spiritual/Shinto   Type Spiritual support   Intervention Prayer;Communion   Sacraments   Communion Patient received communion

## 2021-10-04 LAB
ANION GAP SERPL CALCULATED.3IONS-SCNC: 14 MMOL/L (ref 9–17)
BUN BLDV-MCNC: 21 MG/DL (ref 6–20)
BUN/CREAT BLD: ABNORMAL (ref 9–20)
CALCIUM SERPL-MCNC: 8.3 MG/DL (ref 8.6–10.4)
CHLORIDE BLD-SCNC: 106 MMOL/L (ref 98–107)
CO2: 18 MMOL/L (ref 20–31)
CREAT SERPL-MCNC: 1.19 MG/DL (ref 0.7–1.2)
ESTIMATED AVERAGE GLUCOSE: 269 MG/DL
GFR AFRICAN AMERICAN: >60 ML/MIN
GFR NON-AFRICAN AMERICAN: >60 ML/MIN
GFR SERPL CREATININE-BSD FRML MDRD: ABNORMAL ML/MIN/{1.73_M2}
GFR SERPL CREATININE-BSD FRML MDRD: ABNORMAL ML/MIN/{1.73_M2}
GLUCOSE BLD-MCNC: 128 MG/DL (ref 75–110)
GLUCOSE BLD-MCNC: 149 MG/DL (ref 75–110)
GLUCOSE BLD-MCNC: 154 MG/DL (ref 75–110)
GLUCOSE BLD-MCNC: 195 MG/DL (ref 70–99)
GLUCOSE BLD-MCNC: 196 MG/DL (ref 75–110)
HBA1C MFR BLD: 11 % (ref 4–6)
HCT VFR BLD CALC: 27.3 % (ref 41–53)
HEMOGLOBIN: 8.7 G/DL (ref 13.5–17.5)
MCH RBC QN AUTO: 25.4 PG (ref 26–34)
MCHC RBC AUTO-ENTMCNC: 31.9 G/DL (ref 31–37)
MCV RBC AUTO: 79.6 FL (ref 80–100)
NRBC AUTOMATED: ABNORMAL PER 100 WBC
PDW BLD-RTO: 15.8 % (ref 11.5–14.9)
PLATELET # BLD: 279 K/UL (ref 150–450)
PMV BLD AUTO: 6.9 FL (ref 6–12)
POTASSIUM SERPL-SCNC: 4.2 MMOL/L (ref 3.7–5.3)
RBC # BLD: 3.43 M/UL (ref 4.5–5.9)
SODIUM BLD-SCNC: 138 MMOL/L (ref 135–144)
WBC # BLD: 10.5 K/UL (ref 3.5–11)

## 2021-10-04 PROCEDURE — 99232 SBSQ HOSP IP/OBS MODERATE 35: CPT | Performed by: INTERNAL MEDICINE

## 2021-10-04 PROCEDURE — 2580000003 HC RX 258: Performed by: NURSE PRACTITIONER

## 2021-10-04 PROCEDURE — 6370000000 HC RX 637 (ALT 250 FOR IP): Performed by: NURSE PRACTITIONER

## 2021-10-04 PROCEDURE — 6360000002 HC RX W HCPCS: Performed by: INTERNAL MEDICINE

## 2021-10-04 PROCEDURE — 97162 PT EVAL MOD COMPLEX 30 MIN: CPT

## 2021-10-04 PROCEDURE — 82947 ASSAY GLUCOSE BLOOD QUANT: CPT

## 2021-10-04 PROCEDURE — 97116 GAIT TRAINING THERAPY: CPT

## 2021-10-04 PROCEDURE — 6360000002 HC RX W HCPCS: Performed by: NURSE PRACTITIONER

## 2021-10-04 PROCEDURE — 85027 COMPLETE CBC AUTOMATED: CPT

## 2021-10-04 PROCEDURE — 36415 COLL VENOUS BLD VENIPUNCTURE: CPT

## 2021-10-04 PROCEDURE — 2060000000 HC ICU INTERMEDIATE R&B

## 2021-10-04 PROCEDURE — 80048 BASIC METABOLIC PNL TOTAL CA: CPT

## 2021-10-04 RX ADMIN — ENOXAPARIN SODIUM 180 MG: 100 INJECTION SUBCUTANEOUS at 08:28

## 2021-10-04 RX ADMIN — Medication 10 ML: at 22:00

## 2021-10-04 RX ADMIN — METFORMIN HYDROCHLORIDE 1000 MG: 1000 TABLET ORAL at 17:51

## 2021-10-04 RX ADMIN — CETIRIZINE HYDROCHLORIDE 10 MG: 10 TABLET, FILM COATED ORAL at 08:28

## 2021-10-04 RX ADMIN — KETOROLAC TROMETHAMINE 30 MG: 30 INJECTION, SOLUTION INTRAMUSCULAR; INTRAVENOUS at 12:59

## 2021-10-04 RX ADMIN — INSULIN LISPRO 1 UNITS: 100 INJECTION, SOLUTION INTRAVENOUS; SUBCUTANEOUS at 21:51

## 2021-10-04 RX ADMIN — METFORMIN HYDROCHLORIDE 1000 MG: 1000 TABLET ORAL at 08:28

## 2021-10-04 RX ADMIN — PANTOPRAZOLE SODIUM 40 MG: 40 TABLET, DELAYED RELEASE ORAL at 08:28

## 2021-10-04 RX ADMIN — ANTI-FUNGAL POWDER MICONAZOLE NITRATE TALC FREE: 1.42 POWDER TOPICAL at 08:29

## 2021-10-04 RX ADMIN — INSULIN LISPRO 2 UNITS: 100 INJECTION, SOLUTION INTRAVENOUS; SUBCUTANEOUS at 12:59

## 2021-10-04 RX ADMIN — Medication 10 ML: at 08:31

## 2021-10-04 RX ADMIN — DICLOFENAC SODIUM 2 G: 10 GEL TOPICAL at 21:51

## 2021-10-04 RX ADMIN — ALOGLIPTIN 25 MG: 25 TABLET, FILM COATED ORAL at 08:28

## 2021-10-04 RX ADMIN — BUPROPION HYDROCHLORIDE 300 MG: 300 TABLET, FILM COATED, EXTENDED RELEASE ORAL at 08:28

## 2021-10-04 RX ADMIN — ATORVASTATIN CALCIUM 20 MG: 20 TABLET, FILM COATED ORAL at 08:28

## 2021-10-04 RX ADMIN — ANTI-FUNGAL POWDER MICONAZOLE NITRATE TALC FREE: 1.42 POWDER TOPICAL at 21:51

## 2021-10-04 RX ADMIN — HYDROCODONE BITARTRATE AND ACETAMINOPHEN 1 TABLET: 5; 325 TABLET ORAL at 15:47

## 2021-10-04 RX ADMIN — HYDROCODONE BITARTRATE AND ACETAMINOPHEN 1 TABLET: 5; 325 TABLET ORAL at 08:28

## 2021-10-04 RX ADMIN — SODIUM CHLORIDE: 9 INJECTION, SOLUTION INTRAVENOUS at 02:40

## 2021-10-04 RX ADMIN — ENOXAPARIN SODIUM 40 MG: 40 INJECTION SUBCUTANEOUS at 21:51

## 2021-10-04 RX ADMIN — CEFTRIAXONE SODIUM 1000 MG: 1 INJECTION, POWDER, FOR SOLUTION INTRAMUSCULAR; INTRAVENOUS at 00:23

## 2021-10-04 RX ADMIN — PANTOPRAZOLE SODIUM 40 MG: 40 TABLET, DELAYED RELEASE ORAL at 06:23

## 2021-10-04 RX ADMIN — DICLOFENAC SODIUM 2 G: 10 GEL TOPICAL at 08:31

## 2021-10-04 RX ADMIN — INSULIN LISPRO 2 UNITS: 100 INJECTION, SOLUTION INTRAVENOUS; SUBCUTANEOUS at 08:30

## 2021-10-04 RX ADMIN — KETOROLAC TROMETHAMINE 30 MG: 30 INJECTION, SOLUTION INTRAMUSCULAR; INTRAVENOUS at 00:23

## 2021-10-04 RX ADMIN — SPIRONOLACTONE 50 MG: 25 TABLET, FILM COATED ORAL at 21:51

## 2021-10-04 RX ADMIN — SPIRONOLACTONE 50 MG: 25 TABLET, FILM COATED ORAL at 08:28

## 2021-10-04 RX ADMIN — ASPIRIN 81 MG: 81 TABLET, COATED ORAL at 08:28

## 2021-10-04 ASSESSMENT — PAIN DESCRIPTION - FREQUENCY
FREQUENCY: INTERMITTENT
FREQUENCY: INTERMITTENT

## 2021-10-04 ASSESSMENT — PAIN DESCRIPTION - PAIN TYPE
TYPE: ACUTE PAIN
TYPE: ACUTE PAIN

## 2021-10-04 ASSESSMENT — PAIN SCALES - GENERAL
PAINLEVEL_OUTOF10: 7
PAINLEVEL_OUTOF10: 8
PAINLEVEL_OUTOF10: 7
PAINLEVEL_OUTOF10: 8
PAINLEVEL_OUTOF10: 7

## 2021-10-04 ASSESSMENT — PAIN DESCRIPTION - LOCATION
LOCATION: LEG;KNEE
LOCATION: LEG;KNEE

## 2021-10-04 ASSESSMENT — PAIN DESCRIPTION - ORIENTATION
ORIENTATION: LEFT
ORIENTATION: LEFT

## 2021-10-04 ASSESSMENT — PAIN DESCRIPTION - DESCRIPTORS
DESCRIPTORS: ACHING
DESCRIPTORS: ACHING

## 2021-10-04 NOTE — CARE COORDINATION
ONGOING DISCHARGE PLAN:    Patient is alert and oriented x4. Spoke with patient regarding discharge plan and patient confirms that plan is still home without needs. Declined VNS. Active order for IV Rocephin. BLE venous scans for DVT done. No evidence of DVT on the left, right could not be visualized. Pt is on full dose Lovenox. Will continue to follow for additional discharge needs.     Electronically signed by Jenae Britt RN on 10/4/2021 at 11:41 AM

## 2021-10-04 NOTE — PLAN OF CARE
Problem: Falls - Risk of:  Goal: Will remain free from falls  Description: Will remain free from falls  10/4/2021 0544 by Michelle Galvan RN  Outcome: Ongoing  Note: Patient remained free from falls all throughout shift. Bed locked, side rails up X2, belongings and call light within reach. 10/3/2021 1711 by Justin Dooley RN  Outcome: Met This Shift  Note: Patient is alert and oriented and remains free from falls this shift. Bed is locked and in lowest position. Call light is within reach and patient is using appropriately. RN will continue to monitor. Goal: Absence of physical injury  Description: Absence of physical injury  Outcome: Ongoing     Problem: Pain:  Goal: Pain level will decrease  Description: Pain level will decrease  Outcome: Ongoing  Goal: Control of acute pain  Description: Control of acute pain  10/4/2021 0544 by Michelle Galvan RN  Outcome: Ongoing  10/3/2021 1711 by Justin Dooley RN  Outcome: Met This Shift  Note: Adequate pain control maintained this shift. Medicated per PRN orders. See MAR. RN will continue to monitor.      Goal: Control of chronic pain  Description: Control of chronic pain  Outcome: Ongoing     Problem: Nutrition  Goal: Optimal nutrition therapy  10/4/2021 0544 by Michelle Galvan RN  Outcome: Ongoing  10/3/2021 1711 by Justin Dooley RN  Outcome: Met This Shift

## 2021-10-04 NOTE — PLAN OF CARE
Problem: Falls - Risk of:  Goal: Will remain free from falls  10/4/2021 1641 by Denys Ace RN  Outcome: Ongoing     Problem: Falls - Risk of:  Goal: Absence of physical injury  10/4/2021 1641 by Denys Ace RN  Outcome: Ongoing     Problem: Pain:  Goal: Pain level will decrease  10/4/2021 1641 by Denys Ace RN  Outcome: Ongoing     Norco and toradol is being used for pain control.     Problem: Nutrition  Goal: Optimal nutrition therapy  10/4/2021 1641 by Denys Ace RN  Outcome: Ongoing

## 2021-10-04 NOTE — PROGRESS NOTES
Physical Therapy        Physical Therapy Cancel Note      DATE: 10/4/2021    NAME: Adeline Barrera  MRN: [id number]   : 1970      Patient not seen this date for Physical Therapy due to: pt unavailable at this time; pt on an important phone call. PT to check back if time allows.       Electronically signed by Noe Prasad PT on 10/4/2021 at 2:16 PM

## 2021-10-04 NOTE — PROGRESS NOTES
Physical Therapy    Facility/Department: [facility] PROGRESSIVE CARE  Initial Assessment    NAME: Davina Coto  : 1970  MRN: [id number]    Date of Service: 10/4/2021    Discharge Recommendations:  Patient would benefit from continued therapy after discharge   PT Equipment Recommendations  Equipment Needed:  (TBD)    Assessment   Body structures, Functions, Activity limitations: Decreased functional mobility ; Decreased strength;Decreased sensation;Decreased balance; Increased pain  Assessment: pt performs bed mobility Mod I and requires CGA for safety during Transfers, gait, and stairs without using a device. Pt would benefit from continued PT to address deficits in strength, balance, and overall functional mobility and to progress pt to prior level of independence. Treatment Diagnosis: Impaired functional mobility, dec BLE strength, and decreased balance 2* to cellulitis and complicated UTI  Specific instructions for Next Treatment: Gait training with RW to assess benefit (RW use to decrease pain and improve balance?), stair training, balance training  Prognosis: Good  Decision Making: Medium Complexity  History: The patient is a 49 y.o.  male, with a history of asthma, diabetes mellitus, HTN, hyperlipidemia, nonalcoholic hepatosteatosis, and unspecified sleep apnea, who presents with cellulitis, shortness of breath, and chest pain. According to patient, he has been experiencing flulike symptoms for the past 2 days, stating that he developed chills, headache, nausea, vomiting, and temp of 101 °F on . He later noted that he was having erythema, edema, and increased pain and warmth of left lower extremity. pt also reporting frequent and Reoccuring UTIs   Exam: ROM, MMT, Balance, and mobility assessments  Clinical Presentation: pt is motivated to get better and to change his lifestyle habits to improve health overall.  pt is pleasant throughout assessments  REQUIRES PT FOLLOW UP: Yes  Activity Tolerance  Activity Tolerance: Patient Tolerated treatment well;Patient limited by pain  Activity Tolerance: overall pt tolerates mobility assessments well but does so with increased pain in LLE. Stair negotiation difficult for pt d/t inc pain. Patient Diagnosis(es): The primary encounter diagnosis was Acute cystitis without hematuria. A diagnosis of Cellulitis of left lower extremity was also pertinent to this visit. has a past medical history of Acute pyelonephritis, Asthma, Diabetes, GERD (gastroesophageal reflux disease), Hyperlipidemia, Liver disease, MVP (mitral valve prolapse), Screening PSA (prostate specific antigen), and Unspecified sleep apnea. has a past surgical history that includes Anterior cruciate ligament repair (2/2009) and Tonsillectomy (2005). Restrictions  Restrictions/Precautions  Restrictions/Precautions: Contact Precautions, Up as Tolerated (Contact isolation ESBL )  Required Braces or Orthoses?: Yes  Required Braces or Orthoses  Left Lower Extremity Brace: Knee Brace (wears prn)  Vision/Hearing  Vision: Impaired  Vision Exceptions: Wears glasses at all times  Hearing: Exceptions to [facility]  Hearing Exceptions:  (tinnitus bilateral ears)     Subjective  General  Chart Reviewed: Yes  Patient assessed for rehabilitation services?: Yes  Additional Pertinent Hx: The patient is a 49 y.o.  male, with a history of asthma, diabetes mellitus, HTN, hyperlipidemia, nonalcoholic hepatosteatosis, and unspecified sleep apnea, who presents with cellulitis, shortness of breath, and chest pain. According to patient, he has been experiencing flulike symptoms for the past 2 days, stating that he developed chills, headache, nausea, vomiting, and temp of 101 °F on 9/29. He later noted that he was having erythema, edema, and increased pain and warmth of left lower extremity.  pt also reporting frequent and Reoccuring UTIs   Response To Previous Treatment: Not applicable  Family / Caregiver Present: No  Referring Practitioner: Dr Garcia Other  Referral Date : 10/04/21  Diagnosis: Acute Cystitis w/o hematuria, cellulitis of LLE, Complicated UTI  Follows Commands: Within Functional Limits  General Comment  Comments: ok to proceed with PT Eval per nurse Ludy Owens  Subjective  Subjective: pt is pleasant and agreeable to therapy assessments. pt discusses recent h/o frequent UTIs and is planning Dr appointments and diagnostic testing  Pain Screening  Patient Currently in Pain: Yes  Pain Assessment  Pain Assessment: 0-10  Pain Level: 7  Pain Type: Acute pain  Pain Location: Leg;Knee  Pain Orientation: Left  Pain Descriptors: Aching  Pain Frequency: Intermittent  Non-Pharmaceutical Pain Intervention(s): Distraction; Rest  Vital Signs  Patient Currently in Pain: Yes  Patient Observation  Observations: pt is seated at EOB upon arrival of therapy. Peripheral IV R forearm and telemetry       Orientation  Orientation  Overall Orientation Status: Within Functional Limits  Social/Functional History  Social/Functional History  Lives With: Spouse  Type of Home: House  Home Layout: One level  Home Access: Stairs to enter with rails  Entrance Stairs - Number of Steps: 2  Entrance Stairs - Rails: Left (vertical grab bar)  Bathroom Shower/Tub: Doors, Shower chair with back, Walk-in shower  Bathroom Toilet: Handicap height  Bathroom Equipment: Grab bars in shower, Shower chair, Hand-held shower  Bathroom Accessibility: Accessible, Walker accessible  Home Equipment: Rolling walker, [employer] Help From: Family, Friend(s)  ADL Assistance: Independent  Homemaking Assistance: Independent  Homemaking Responsibilities: Yes  Ambulation Assistance: Independent  Transfer Assistance: Independent  Active : Yes  Mode of Transportation: SUV  Occupation: Full time employment  Type of occupation:   IADL Comments: sleeps in a flat bed at home: Cares for 4 dogs at home, riding recumbent bicycle 2x per week.   Additional Comments: pt's spouse is home currently but will be starting job soon, wife is available to assist prn. pt's parents and friends also available prn  Cognition        Objective          AROM RLE (degrees)  RLE AROM: WFL  AROM LLE (degrees)  LLE AROM : WFL  AROM RUE (degrees)  RUE General AROM: See OT Eval  AROM LUE (degrees)  LUE General AROM: See OT Eval  Strength RLE  Comment: Grossly 4+/5  Strength LLE  Comment: Grossly 4/5  Strength RUE  Comment: See OT Eval  Strength LUE  Comment: See OT Eval     Sensation  Overall Sensation Status: Impaired (pt reports N/T in bilateral feet)  Bed mobility  Rolling to Left: Modified independent  Rolling to Right: Modified independent  Supine to Sit: Modified independent  Sit to Supine: Modified independent  Scooting: Modified independent  Comment: Bed mobility with HOB elevated and minimal use of rails  Transfers  Sit to Stand: Contact guard assistance  Stand to sit: Contact guard assistance  Bed to Chair: Contact guard assistance  Stand Pivot Transfers: Contact guard assistance  Comment: All transfers without AD. Ambulation  Ambulation?: Yes  Ambulation 1  Surface: level tile  Device: No Device  Assistance: Contact guard assistance  Quality of Gait: pt amb slowly with BLE externally rotated and antalgia. pt with inc lateral sway. Gait Deviations: Slow Jazmin; Increased SUSAN  Distance: 15'x2  Comments: pt amb in room without device appearing mildly unsteady. pt with inc pain during mobility  Stairs/Curb  Stairs?: Yes  Stairs  # Steps : 1  Stairs Height: 8\"  Rails: Right ascending  Device: No Device  Assistance: Contact guard assistance  Comment: pt requires inc time to ascend up 1 step; pt states that his steps to enter the home are not as tall.       Balance  Posture: Fair  Sitting - Static: Good  Sitting - Dynamic: Good  Standing - Static: Fair (without AD)  Standing - Dynamic: Fair (without AD)  Comments: standing balance assessed without AD        Plan   Plan  Times per

## 2021-10-04 NOTE — PROGRESS NOTES
[place] [address] Internal Medicine    Progress Note     10/4/2021    11:46 AM    Name:   Malick Simon  MRN:     [id number]     Acct:      [de-identified]   Room:   2093/2093-01   Day:  3  Admit Date:  10/1/2021  9:08 PM    PCP:   Sofi Snyder  Code Status:  Full Code    Subjective:     C/C:     Principal Problem:    Cellulitis of left lower extremity  Active Problems:    Sleep apnea    Diabetes mellitus ([address]    Complicated UTI (urinary tract infection)  Resolved Problems:    * No resolved hospital problems.  *      Patient with past medical history multiple medical problem which includes morbid obesity, diabetes, hypertension, hyperlipidemia, asthma, obstructive sleep apnea on CPAP, history of recurrent urinary tract infections, patient plan to get cystoscopy as outpatient on 10/5  Admitted with chills fever pain in left leg, patient treated with IV vancomycin which was changed to IV Rocephin  Doppler of leg was negative for DVT  Patient feeling much better today  Redness in the leg is improved           Significant last 24 hr data reviewed ;   Vitals:    10/03/21 1940 10/04/21 0018 10/04/21 0535 10/04/21 0701   BP: 112/63 107/80  127/67   Pulse: 74 79  69   Resp: 18 18  16   Temp: 98.1 °F (36.7 °C) 98.1 °F (36.7 °C)  97.8 °F (36.6 °C)   TempSrc: Oral Oral  Oral   SpO2: 96% 98%  95%   Weight:   (!) 399 lb 14.6 oz (181.4 kg)    Height:          Recent Results (from the past 24 hour(s))   POC Glucose Fingerstick    Collection Time: 10/03/21  3:50 PM   Result Value Ref Range    POC Glucose 159 (H) 75 - 110 mg/dL   POC Glucose Fingerstick    Collection Time: 10/03/21  7:41 PM   Result Value Ref Range    POC Glucose 136 (H) 75 - 110 mg/dL   Basic Metabolic Panel w/ Reflex to MG    Collection Time: 10/04/21  5:46 AM   Result Value Ref Range    Glucose 195 (H) 70 - 99 mg/dL    BUN 21 (H) 6 - 20 mg/dL    CREATININE 1.19 0.70 - 1.20 mg/dL    Bun/Cre Ratio NOT REPORTED 9 - 20    Calcium 8.3 (L) 8.6 - 10.4 mg/dL    Sodium 138 135 - 144 mmol/L    Potassium 4.2 3.7 - 5.3 mmol/L    Chloride 106 98 - 107 mmol/L    CO2 18 (L) 20 - 31 mmol/L    Anion Gap 14 9 - 17 mmol/L    GFR Non-African American >60 >60 mL/min    GFR African American >60 >60 mL/min    GFR Comment          GFR Staging NOT REPORTED    CBC    Collection Time: 10/04/21  5:46 AM   Result Value Ref Range    WBC 10.5 3.5 - 11.0 k/uL    RBC 3.43 (L) 4.5 - 5.9 m/uL    Hemoglobin 8.7 (L) 13.5 - 17.5 g/dL    Hematocrit 27.3 (L) 41 - 53 %    MCV 79.6 (L) 80 - 100 fL    MCH 25.4 (L) 26 - 34 pg    MCHC 31.9 31 - 37 g/dL    RDW 15.8 (H) 11.5 - 14.9 %    Platelets 982 190 - 457 k/uL    MPV 6.9 6.0 - 12.0 fL    NRBC Automated NOT REPORTED per 100 WBC   POC Glucose Fingerstick    Collection Time: 10/04/21  6:58 AM   Result Value Ref Range    POC Glucose 196 (H) 75 - 110 mg/dL     Recent Labs     10/02/21  1951 10/03/21  1051 10/03/21  1550 10/03/21  1941 10/04/21  0658   POCGLU 183* 212* 159* 136* 196*        No results found. On admission         Review of Systems:     Constitutional:  negative for chills, fevers, sweats  Respiratory:  negative for cough, dyspnea on exertion, hemoptysis, shortness of breath, wheezing  Cardiovascular:  negative for chest pain, chest pressure/discomfort, lower extremity edema, palpitations  Gastrointestinal:  negative for abdominal pain, constipation, diarrhea, nausea, vomiting  Neurological:  negative for dizziness, headache  Extremity -redness, swelling of left leg  Data:     Past Medical History:  no change     Social History:  no change    Family History: @no change    Vitals:      I/O (24Hr): Intake/Output Summary (Last 24 hours) at 10/4/2021 1146  Last data filed at 10/4/2021 0758  Gross per 24 hour   Intake 1942 ml   Output --   Net 1942 ml       Labs:    Radiology:    Medications:      Allergies:      Current Meds:   Scheduled Meds:    enoxaparin  1 mg/kg SubCUTAneous BID    alogliptin  25 mg Oral Daily    bruits, thyroid not palpable  Lungs: Bilateral equal air entry, clear to ausculation, no wheezing, rales or rhonchi, normal effort  Cardiovascular: normal rate, regular rhythm, no murmur, gallop, rub. Abdomen: Soft, nontender, nondistended, normal bowel sounds, no hepatomegaly or splenomegaly  Neurologic: There are no new focal motor or sensory deficits,   Skin: No gross lesions, rashes, bruising or bleeding on exposed skin area  Extremities: Swelling, redness, tenderness of left leg  Psych:             Assessment:        Primary Problem  Cellulitis of left lower extremity    Principal Problem:    Cellulitis of left lower extremity  Active Problems:    Sleep apnea    Diabetes mellitus ([address]    Complicated UTI (urinary tract infection)  Resolved Problems:    * No resolved hospital problems. *       Plan:          10/4/21    · Left leg cellulitis, improving, patient is on IV Rocephin  · Urine culture, positive for group B streptococci, on Rocephin  · Patient will have outpatient cystoscopy soon  · Diabetes, restarted home medication, controlled  · Doppler of lower leg is negative for DVT , patient denying complaining of any chest pain, shortness of breath today, will change Lovenox to prophylactic doses  · Obstructive sleep apnea using home dose of CPAP  · Morbid obesity  · Hypertension, controlled  · Will move to MedSurg unit  · We will discontinue fluid  · PT/OT consult  · Likely discharge tomorrow if symptoms improve      . Hospital Problems         Last Modified POA    * (Principal) Cellulitis of left lower extremity 10/2/2021 Yes    Sleep apnea 10/2/2021 Yes    Diabetes mellitus ([address] 10/2/2021 Yes    Overview Signed 9/7/2015  4:20 AM by [facility]     replace inactive diagnosis         Complicated UTI (urinary tract infection) 10/1/2021 Yes                         Thanks for consulting us . Will monitor vitals and clinical course , and  Optimize therapy  as needed .            Abebe Szymanski MD

## 2021-10-05 VITALS
WEIGHT: 315 LBS | SYSTOLIC BLOOD PRESSURE: 118 MMHG | DIASTOLIC BLOOD PRESSURE: 56 MMHG | HEIGHT: 66 IN | TEMPERATURE: 98.2 F | BODY MASS INDEX: 50.62 KG/M2 | OXYGEN SATURATION: 96 % | RESPIRATION RATE: 20 BRPM | HEART RATE: 68 BPM

## 2021-10-05 LAB
ANION GAP SERPL CALCULATED.3IONS-SCNC: 12 MMOL/L (ref 9–17)
BUN BLDV-MCNC: 17 MG/DL (ref 6–20)
BUN/CREAT BLD: ABNORMAL (ref 9–20)
CALCIUM SERPL-MCNC: 8.7 MG/DL (ref 8.6–10.4)
CHLORIDE BLD-SCNC: 108 MMOL/L (ref 98–107)
CO2: 19 MMOL/L (ref 20–31)
CREAT SERPL-MCNC: 1.07 MG/DL (ref 0.7–1.2)
GFR AFRICAN AMERICAN: >60 ML/MIN
GFR NON-AFRICAN AMERICAN: >60 ML/MIN
GFR SERPL CREATININE-BSD FRML MDRD: ABNORMAL ML/MIN/{1.73_M2}
GFR SERPL CREATININE-BSD FRML MDRD: ABNORMAL ML/MIN/{1.73_M2}
GLUCOSE BLD-MCNC: 143 MG/DL (ref 75–110)
GLUCOSE BLD-MCNC: 158 MG/DL (ref 75–110)
GLUCOSE BLD-MCNC: 160 MG/DL (ref 70–99)
HCT VFR BLD CALC: 26.6 % (ref 41–53)
HEMOGLOBIN: 8.4 G/DL (ref 13.5–17.5)
MCH RBC QN AUTO: 25.1 PG (ref 26–34)
MCHC RBC AUTO-ENTMCNC: 31.8 G/DL (ref 31–37)
MCV RBC AUTO: 78.8 FL (ref 80–100)
NRBC AUTOMATED: ABNORMAL PER 100 WBC
PDW BLD-RTO: 15.6 % (ref 11.5–14.9)
PLATELET # BLD: 293 K/UL (ref 150–450)
PMV BLD AUTO: 7.1 FL (ref 6–12)
POTASSIUM SERPL-SCNC: 4.2 MMOL/L (ref 3.7–5.3)
RBC # BLD: 3.37 M/UL (ref 4.5–5.9)
SODIUM BLD-SCNC: 139 MMOL/L (ref 135–144)
WBC # BLD: 11.8 K/UL (ref 3.5–11)

## 2021-10-05 PROCEDURE — 80048 BASIC METABOLIC PNL TOTAL CA: CPT

## 2021-10-05 PROCEDURE — 6370000000 HC RX 637 (ALT 250 FOR IP): Performed by: NURSE PRACTITIONER

## 2021-10-05 PROCEDURE — 85027 COMPLETE CBC AUTOMATED: CPT

## 2021-10-05 PROCEDURE — 6360000002 HC RX W HCPCS: Performed by: NURSE PRACTITIONER

## 2021-10-05 PROCEDURE — 36415 COLL VENOUS BLD VENIPUNCTURE: CPT

## 2021-10-05 PROCEDURE — 97165 OT EVAL LOW COMPLEX 30 MIN: CPT

## 2021-10-05 PROCEDURE — 6360000002 HC RX W HCPCS: Performed by: INTERNAL MEDICINE

## 2021-10-05 PROCEDURE — 2580000003 HC RX 258: Performed by: NURSE PRACTITIONER

## 2021-10-05 PROCEDURE — 99239 HOSP IP/OBS DSCHRG MGMT >30: CPT | Performed by: INTERNAL MEDICINE

## 2021-10-05 PROCEDURE — 82947 ASSAY GLUCOSE BLOOD QUANT: CPT

## 2021-10-05 RX ORDER — AMOXICILLIN AND CLAVULANATE POTASSIUM 875; 125 MG/1; MG/1
1 TABLET, FILM COATED ORAL 2 TIMES DAILY
Qty: 14 TABLET | Refills: 0 | Status: SHIPPED | OUTPATIENT
Start: 2021-10-05 | End: 2021-10-12

## 2021-10-05 RX ADMIN — ALOGLIPTIN 25 MG: 25 TABLET, FILM COATED ORAL at 08:15

## 2021-10-05 RX ADMIN — PANTOPRAZOLE SODIUM 40 MG: 40 TABLET, DELAYED RELEASE ORAL at 06:22

## 2021-10-05 RX ADMIN — ANTI-FUNGAL POWDER MICONAZOLE NITRATE TALC FREE: 1.42 POWDER TOPICAL at 08:32

## 2021-10-05 RX ADMIN — ASPIRIN 81 MG: 81 TABLET, COATED ORAL at 08:15

## 2021-10-05 RX ADMIN — DICLOFENAC SODIUM 2 G: 10 GEL TOPICAL at 08:32

## 2021-10-05 RX ADMIN — HYDROCODONE BITARTRATE AND ACETAMINOPHEN 1 TABLET: 5; 325 TABLET ORAL at 08:26

## 2021-10-05 RX ADMIN — CEFTRIAXONE SODIUM 1000 MG: 1 INJECTION, POWDER, FOR SOLUTION INTRAMUSCULAR; INTRAVENOUS at 00:40

## 2021-10-05 RX ADMIN — Medication 10 ML: at 08:15

## 2021-10-05 RX ADMIN — METFORMIN HYDROCHLORIDE 1000 MG: 1000 TABLET ORAL at 08:15

## 2021-10-05 RX ADMIN — ENOXAPARIN SODIUM 40 MG: 40 INJECTION SUBCUTANEOUS at 08:16

## 2021-10-05 RX ADMIN — INSULIN LISPRO 2 UNITS: 100 INJECTION, SOLUTION INTRAVENOUS; SUBCUTANEOUS at 08:15

## 2021-10-05 RX ADMIN — ATORVASTATIN CALCIUM 20 MG: 20 TABLET, FILM COATED ORAL at 08:15

## 2021-10-05 RX ADMIN — SPIRONOLACTONE 50 MG: 25 TABLET, FILM COATED ORAL at 08:15

## 2021-10-05 RX ADMIN — BUPROPION HYDROCHLORIDE 300 MG: 300 TABLET, FILM COATED, EXTENDED RELEASE ORAL at 08:15

## 2021-10-05 RX ADMIN — CETIRIZINE HYDROCHLORIDE 10 MG: 10 TABLET, FILM COATED ORAL at 08:15

## 2021-10-05 ASSESSMENT — PAIN SCALES - GENERAL
PAINLEVEL_OUTOF10: 7
PAINLEVEL_OUTOF10: 6

## 2021-10-05 ASSESSMENT — PAIN DESCRIPTION - LOCATION: LOCATION: LEG

## 2021-10-05 ASSESSMENT — PAIN DESCRIPTION - PAIN TYPE: TYPE: ACUTE PAIN

## 2021-10-05 ASSESSMENT — PAIN DESCRIPTION - ORIENTATION: ORIENTATION: LEFT;LOWER

## 2021-10-05 NOTE — PROGRESS NOTES
Pt. Discharged home from unit. Pt provided discharge instructions and education was provided before pt left the unit.

## 2021-10-05 NOTE — PLAN OF CARE
Problem: Falls - Risk of:  Goal: Will remain free from falls  Description: Will remain free from falls  10/5/2021 0452 by Raimundo Burrows RN  Outcome: Ongoing  Note: Patient remains free of falls and injuries throughout shift. Bed remains in the lowest position, wheels locked, call light and bedside table are within reach. 10/4/2021 1641 by Jenelle Crocker RN  Outcome: Ongoing  Goal: Absence of physical injury  Description: Absence of physical injury  10/5/2021 0452 by Raimundo Burrows RN  Outcome: Ongoing  10/4/2021 1641 by Jenelle Crocker RN  Outcome: Ongoing     Problem: Pain:  Goal: Pain level will decrease  Description: Pain level will decrease  10/5/2021 0452 by Raimundo Burrows RN  Outcome: Ongoing  10/4/2021 1641 by Jenelle Crocker RN  Outcome: Ongoing  Goal: Control of acute pain  Description: Control of acute pain  10/5/2021 0452 by Raimundo Burrows RN  Outcome: Ongoing  Note: Assess the location, characteristics, onset, duration, frequency, quality, and severity of pain. Encourage immediate report of pain. Use appropriate pain scale to rate pain. Manage pain using nonpharmacologic/pharmacologic interventions.    10/4/2021 1641 by Jenelle Crocker RN  Outcome: Ongoing  Goal: Control of chronic pain  Description: Control of chronic pain  10/5/2021 0452 by Raimundo Burrows RN  Outcome: Ongoing  10/4/2021 1641 by Jenelle Crocker RN  Outcome: Ongoing     Problem: Nutrition  Goal: Optimal nutrition therapy  10/5/2021 0452 by Raimundo Burrows RN  Outcome: Ongoing  10/4/2021 1641 by Jenelle Crocker RN  Outcome: Ongoing     Problem: Musculor/Skeletal Functional Status  Goal: Highest potential functional level  Outcome: Ongoing  Goal: Absence of falls  Outcome: Ongoing

## 2021-10-05 NOTE — PROGRESS NOTES
Patient transferred to [facility] room 2051 via wheelchair, all belongings sent with patient.

## 2021-10-05 NOTE — PROGRESS NOTES
Physician Progress Note      Drew Suggs  [facility] #:                  [id number]  :                       1970  ADMIT DATE:       10/1/2021 9:08 PM  [address] [place] DATE:        10/5/2021 12:25 PM  RESPONDING  PROVIDER #:        Betzaida House MD          QUERY TEXT:    Pt admitted with LLE  cellulitis. Pt noted to have DM  noted blood glucose on   admission of 229 . If possible, please document in progress notes and   discharge summary the relationship, if any, between cellulitis and DM. The medical record reflects the following:  Risk Factors: Morbid obesity, DM,  Clinical Indicators: blood glucose on admission 229 down to 149-158  history   of uncontrolled DM. Treatment: Metformin, Insulin sliding scale, carb control diet, iv antibiotics    thank you Call if questions [facility]                                                                                                                                                                                                                                                                                                                                 [phone]  Options provided:  -- LLE cellulitis associated with Diabetes  -- LLE  cellulitis unrelated to Diabetes  -- Other - I will add my own diagnosis  -- Disagree - Not applicable / Not valid  -- Disagree - Clinically unable to determine / Unknown  -- Refer to Clinical Documentation Reviewer    PROVIDER RESPONSE TEXT:    LLE Cellulitis associated with Diabetes. Query created by:  Richard Chaudhary on 10/5/2021 9:14 AM      Electronically signed by:  Betzaida House MD 10/5/2021 2:59 PM

## 2021-10-05 NOTE — PROGRESS NOTES
[address] [facility]    Occupational Therapy Evaluation  Date: 10/5/21  Patient Name: Lisa Fry       Room: 0486/7184-67  MRN: [id number]  Account: [de-identified]   : 1970  (49 y.o.) Gender: male     Discharge Recommendations: The patient's needs are being met with no further Occupational Therapy recommended at discharge. Referring Practitioner: Carol Livingston MD             Past Medical History:  has a past medical history of Acute pyelonephritis, Asthma, Diabetes, GERD (gastroesophageal reflux disease), Hyperlipidemia, Liver disease, MVP (mitral valve prolapse), Screening PSA (prostate specific antigen), and Unspecified sleep apnea. Past Surgical History:   has a past surgical history that includes Anterior cruciate ligament repair (2009) and Tonsillectomy ().     Restrictions  Restrictions/Precautions: Contact Precautions, Up as Tolerated (Contact isolation ESBL )  Required Braces or Orthoses  Left Lower Extremity Brace: Knee Brace (wears prn)  Required Braces or Orthoses?: Yes     Vitals  Temp: 98.2 °F (36.8 °C)  Pulse: 68  Resp: 20  BP: (!) 118/56  Height: 5' 6\" (167.6 cm)  Weight: (!) 399 lb 14.6 oz (181.4 kg)  BMI (Calculated): 64.7  Oxygen Therapy  SpO2: 96 %  Pulse Oximeter Device Mode: Intermittent  Pulse Oximeter Device Location: Finger  O2 Device: None (Room air)  Skin Assessment: Clean, dry, & intact  FiO2 : 21 %  Level of Consciousness: Alert (0)    Subjective  Subjective: Pt resting in bed upon arrival. Pt was pleasant and agreeable to OT eval   Comments: Ok per [employer] for OT eval  Overall Orientation Status: Within Functional Limits  Vision  Vision: Impaired  Vision Exceptions: Wears glasses at all times  Hearing  Hearing: Exceptions to [facility]  Hearing Exceptions:  (tinnitus bilateral ears)  Social/Functional History  Lives With: Spouse  Type of Home: House  Home Layout: One level  Home Access: Stairs to enter with rails  Entrance Stairs - Number of Steps: 2  Entrance Stairs - Rails: Left (vertical grab bar)  Bathroom Shower/Tub: Doors, Shower chair with back, Walk-in shower  Bathroom Toilet: Handicap height  Bathroom Equipment: Grab bars in shower, Shower chair, Hand-held shower  Bathroom Accessibility: Accessible, Walker accessible  Home Equipment: Rolling walker, Cane  Receives Help From: Family, Friend(s)  ADL Assistance: Independent  Homemaking Assistance: Independent  Homemaking Responsibilities: Yes  Ambulation Assistance: Independent  Transfer Assistance: Independent  Active : Yes  Mode of Transportation: [facility]  Occupation: Full time employment  Type of occupation:   IADL Comments: sleeps in a flat bed at home: [address] for 4 dogs at home, riding recumbent bicycle 2x per week. Additional Comments: pt's spouse is home currently but will be starting job soon, wife is available to assist prn. pt's parents and friends also available prn  Pain Assessment  Pain Assessment: 0-10  Pain Level: 6  Pain Type: Acute pain  Pain Location: Leg  Pain Orientation: Left, Lower    Objective          Sensation  Overall Sensation Status: Impaired (Tingling in left foot)   ADL  Feeding: Modified independent   Grooming: Modified independent   UE Bathing: Modified independent   LE Bathing: Modified independent   UE Dressing: Modified independent   LE Dressing: Modified independent   Toileting: Modified independent   Additional Comments: ADL scores based on clinical reasoning and skilled observation unless otherwise noted. Pt able to don bilateral socks while sitting EOB with modified technique proping LE on the bed to complete due to body habitus. Pt reports that he is able to complete self care tasks with increase time due to pain. Pt denies any concerns with completing self care tasks at this time.      UE Function           LUE Strength  Gross LUE Strength: WFL  L Hand General: 4+/5  LUE Strength Comment: Grossly 4+/5     LUE Tone: Normotonic     LUE AROM (degrees)  LUE AROM : WFL     Left Hand AROM (degrees)  Left Hand AROM: WFL  RUE Strength  Gross RUE Strength: WFL  R Hand General: 4+/5  RUE Strength Comment: Grossly 4+/5      RUE Tone: Normotonic     RUE AROM (degrees)  RUE AROM : WFL     Right Hand AROM (degrees)  Right Hand AROM: WFL    Fine Motor Skills  Coordination  Movements Are Fluid And Coordinated: Yes                           Mobility  Supine to Sit: Modified independent  Sit to Supine: Modified independent       Balance  Sitting Balance: Independent  Standing Balance: Independent  Standing Balance  Time: 1 minute  Activity: funcitonal mobility  Comment: without device  Functional Mobility  Functional - Mobility Device: No device  Activity: Other (to/from door)  Assist Level: Independent  Functional Mobility Comments: Pt completed functional mobility within room with increased time to complete due to pain. Bed mobility  Rolling to Left: Modified independent  Supine to Sit: Modified independent  Sit to Supine: Modified independent  Scooting: Modified independent  Comment: Bed mobility completed with HOB elevated and increased time due to bdoy habitus and pain. Transfers  Sit to stand: Independent  Stand to sit: Independent  Functional Activity Tolerance  Functional Activity Tolerance: Tolerates 30 min exercise with multiple rests     Assessment  Assessment  Assessment: Pt completed bed mobility, functional transfers, functional mobility, and lower body dressing with independence at this time. Pt denies any concerns with completing self care tasks at this time. No further OT needed.    Prognosis: Good  Decision Making: Low Complexity  REQUIRES OT FOLLOW UP: No  No Skilled OT: Safe to return home, No OT goals identified  Activity Tolerance: Patient Tolerated treatment well, Patient limited by pain         Functional Outcome Measures  AM-PAC Daily Activity Inpatient   How much help for putting on and taking off regular lower body clothing?: None  How much help for Bathing?: None  How much help for Toileting?: None  How much help for putting on and taking off regular upper body clothing?: None  How much help for taking care of personal grooming?: None  How much help for eating meals?: None  AM-PAC Inpatient Daily Activity Raw Score: 24  AM-PAC Inpatient ADL T-Scale Score : 57.54  ADL Inpatient CMS 0-100% Score: 0  ADL Inpatient CMS G-Code Modifier : [address]       Goals  Short term goals  Time Frame for Short term goals: D/C OT    Plan  Safety Devices  Safety Devices in place: Yes  Type of devices: Call light within reach, Left in bed, Nurse notified     Plan  Times per week: D/C OT          OT Individual Minutes  Time In: 3641  Time Out: 0935  Minutes: 10    Electronically signed by Jazzmine Askew OT on 10/5/21 at 10:29 AM EDT

## 2021-10-05 NOTE — CARE COORDINATION
ONGOING DISCHARGE PLAN:    Spoke with patient regarding discharge plan yesterday. He confirmed that plan is home without needs. Declined VNS. Active order for IV Rocephin for UTI. Per notes, possible d/c home today. Will continue to follow for additional discharge needs.     Electronically signed by Konstantin Acosta RN on 10/5/2021 at 9:19 AM

## 2021-10-05 NOTE — DISCHARGE SUMMARY
[place] [address] Internal Medicine    Discharge Summary     Patient ID: Chin Pederson  :  1970   MRN: [id number]     ACCOUNT:  [de-identified]   Patient's PCP: Addy Valdez Date: 10/1/2021   Discharge Date: 10/5/2021    Length of Stay: 4  Code Status:  Full Code  Admitting Physician: Una Hercules MD  Discharge Physician: Bárbara Vazquez MD     Active Discharge Diagnoses:     Primary Problem  Cellulitis of left lower extremity      [place] Problems    Diagnosis Date Noted    Cellulitis of left lower extremity [L03.116]     Complicated UTI (urinary tract infection) [N39.0] 10/01/2021    Sleep apnea [G47.30]     Diabetes mellitus ([address] [E11.9]        Admission Condition:  Poor     Discharged Condition: fair    Hospital Stay:     Hospital Course:  Chin Pederson is a 49 y.o. male who was admitted for the management of Cellulitis of left lower extremity , presented to ER with Cellulitis, Shortness of Breath, and Chest Pain    Patient with past medical history multiple medical problem which includes morbid obesity, diabetes, hypertension, hyperlipidemia, asthma, obstructive sleep apnea on CPAP, history of recurrent urinary tract infections, patient plan to get cystoscopy as outpatient  Admitted with chills fever pain in left leg, patient treated with IV vancomycin   Doppler of leg was negative for DVT  Patient feeling much better today  Redness in the leg is improved  Urine culture was growing Streptococcus, getting discharged on p.o.  Augmentin which will cover cellulitis and UTI    Patient to keep leg elevated, advised to use Ace wraps    Significant therapeutic interventions:     Significant Diagnostic Studies:   Labs / Micro:        ,     Radiology:    US RENAL LIMITED    Result Date: 2021  EXAMINATION: ULTRASOUND OF THE KIDNEYS 2021 3:03 pm COMPARISON: CT 10/30/2018 HISTORY: ORDERING SYSTEM PROVIDED HISTORY: Frequent UTI FINDINGS: Difficult exam due to patient body habitus. Left kidney is not well visualized. The right kidney measures 12.1 x 6.4 x 6.1 cm. Cortical thickness and echogenicity appear intact. Patient has a known atrophic left kidney which was not adequately visualized. Kidneys demonstrate normal cortical echogenicity. No hydronephrosis or intrarenal stones. No focal lesions. Increased echogenicity of the liver suggests hepatic steatosis. Unremarkable right kidney. Known atrophic left kidney was unable to be visualized by ultrasound. XR CHEST PORTABLE    Result Date: 10/1/2021  EXAMINATION: ONE XRAY VIEW OF THE CHEST 10/1/2021 10:23 pm COMPARISON: November 12, 2019 HISTORY: ORDERING SYSTEM PROVIDED HISTORY: cough, SOB TECHNOLOGIST PROVIDED HISTORY: Covid rule out cough, SOB Reason for Exam: Cough, shortness of breath, diarrhea, headache, elevated blood sugar, left lower leg redness and swelling Acuity: Acute Type of Exam: Initial Additional signs and symptoms: Cough, shortness of breath, diarrhea, headache, elevated blood sugar, left lower leg redness and swelling Relevant Medical/Surgical History: Cough, shortness of breath, diarrhea, headache, elevated blood sugar, left lower leg redness and swelling FINDINGS: The lungs are without acute focal process. There is no effusion or pneumothorax. The cardiomediastinal silhouette is without acute process. The osseous structures are without acute process. No acute process.      VL DUP LOWER EXTREMITY VENOUS LEFT    Result Date: 10/2/2021    [address] [place] [address]  Vascular Lower Extremities DVT Study Procedure   Patient Name   Ree Carlton       Date of Study           10/02/2021                 [place] Standard F   Date of Birth  1970  Gender                  Male   Age            49 year(s)  Race                       Room Number    14   Corporate ID # [id number]   Patient Acct # [de-identified]   MR #           [id number]      Sonographer             Ghulam Nicholas +------------------------------------+----------+---------------+----------+ ! Common Femoral                      !No        !               !          ! +------------------------------------+----------+---------------+----------+ Left Lower Extremities DVT Study Measurements Left 2D Measurements +------------------------------------+----------+---------------+----------+ ! Location                            ! Visualized! Compressibility! Thrombosis! +------------------------------------+----------+---------------+----------+ ! Common Femoral                      !Yes       ! Yes            ! None      ! +------------------------------------+----------+---------------+----------+ ! Prox Femoral                        !Yes       ! Yes            ! None      ! +------------------------------------+----------+---------------+----------+ ! Mid Femoral                         !No        !               !          ! +------------------------------------+----------+---------------+----------+ ! Dist Femoral                        !Partial   !Yes            ! None      ! +------------------------------------+----------+---------------+----------+ ! Deep Femoral                        !No        !               !          ! +------------------------------------+----------+---------------+----------+ ! Popliteal                           !Yes       ! Yes            ! None      ! +------------------------------------+----------+---------------+----------+ ! Sapheno Femoral Junction            ! Partial   !Yes            ! None      ! +------------------------------------+----------+---------------+----------+ ! PTV                                 ! No        !               !          ! +------------------------------------+----------+---------------+----------+ ! Peroneal                            !No        !               !          ! +------------------------------------+----------+---------------+----------+ ! Gastroc !Partial   !Yes            ! None      ! +------------------------------------+----------+---------------+----------+ ! GSV Thigh                           ! Yes       ! Yes            ! None      ! +------------------------------------+----------+---------------+----------+ ! GSV Knee                            ! Yes       ! Yes            ! None      ! +------------------------------------+----------+---------------+----------+ ! GSV Ankle                           ! Yes       ! Yes            ! None      ! +------------------------------------+----------+---------------+----------+ ! SSV                                 ! Partial   !Yes            ! None      ! +------------------------------------+----------+---------------+----------+ Left Doppler Measurements +---------------------------+------+------+--------------------------------+ ! Location                   ! Signal!Reflux! Reflux (msec)                   ! +---------------------------+------+------+--------------------------------+ ! Common Femoral             !Phasic!      !                                ! +---------------------------+------+------+--------------------------------+ ! Prox Femoral               !Phasic!      !                                ! +---------------------------+------+------+--------------------------------+ ! Popliteal                  !Phasic!      !                                ! +---------------------------+------+------+--------------------------------+        Consultations:    Consults:     Final Specialist Recommendations/Findings:   IP CONSULT TO PRIMARY CARE PROVIDER      The patient was seen and examined on day of discharge and this discharge summary is in conjunction with any daily progress note from day of discharge. Discharge plan:     Disposition: Home    Physician Follow Up:     No follow-up provider specified.      Requiring Further Evaluation/Follow Up POST HOSPITALIZATION/Incidental Findings:    Diet: cardiac diet    Activity: As tolerated    Instructions to Patient:     Discharge Medications:      Medication List      START taking these medications    amoxicillin-clavulanate 875-125 MG per tablet  Commonly known as: AUGMENTIN  Take 1 tablet by mouth 2 times daily for 7 days        CHANGE how you take these medications    Flonase 50 MCG/ACT nasal spray  Generic drug: fluticasone  1 spray by Nasal route 2 times daily  What changed:   · when to take this  · reasons to take this        CONTINUE taking these medications    albuterol sulfate  (90 Base) MCG/ACT inhaler  Inhale 2 puffs into the lungs every 6 hours as needed for Shortness of Breath     alogliptin 25 MG Tabs tablet  Commonly known as: NESINA     aspirin 81 MG tablet     Christian Contour Monitor w/Device Kit     blood glucose test strips strip  Commonly known as: BL Test Strip Pack  1 each by Does not apply route 2 times daily. As needed. buPROPion 300 MG extended release tablet  Commonly known as: WELLBUTRIN XL  TAKE 1 TABLET BY MOUTH  EVERY MORNING     cetirizine 10 MG tablet  Commonly known as: ZYRTEC     CRANBERRY     diclofenac sodium 1 % Gel  Commonly known as: VOLTAREN     empagliflozin 25 MG tablet  Commonly known as: JARDIANCE     FISH OIL     Glucosamine Chondroitin Adv Tabs     metFORMIN 1000 MG tablet  Commonly known as: GLUCOPHAGE     Microlet Lancets Misc     nitroGLYCERIN 0.4 MG SL tablet  Commonly known as: NITROSTAT     nystatin 522401 UNIT/GM powder  Commonly known as: MYCOSTATIN  Apply 2 times daily for 10 days. omeprazole 20 MG delayed release capsule  Commonly known as: PRILOSEC  TAKE 1 CAPSULE BY MOUTH  DAILY     ONE-A-DAY MENS PO     simvastatin 40 MG tablet  Commonly known as: ZOCOR  Take 1 tablet by mouth  nightly     spironolactone 50 MG tablet  Commonly known as: ALDACTONE  TAKE 1 TABLET BY MOUTH TWO  TIMES DAILY     V-2 High Compression Hose Misc  30-40mmHg pressure stockings, knee high.  Wear 12 hours while awake and remove at bedtime     VITAMIN D PO           Where to Get Your Medications      These medications [facility] to [facility]  [address], [address] [place] [address]    Phone: [phone]   · amoxicillin-clavulanate 875-125 MG per tablet         Time Spent on discharge is  35 mins in patient examination, evaluation, counseling as well as medication reconciliation, prescriptions for required medications, discharge plan and follow up. Electronically signed by   Lord Rosetta MD  10/5/2021  10:28 AM      Thank you Dr. Jonas Alexander for the opportunity to be involved in this patient's care.

## 2021-10-07 ENCOUNTER — TELEPHONE (OUTPATIENT)
Dept: INTERNAL MEDICINE CLINIC | Age: 51
End: 2021-10-07

## 2021-10-08 NOTE — TELEPHONE ENCOUNTER
Called Dr Radha Herndon' office and spoke to a general line at the [place]. Inquiring why FMLA is not being done by pt's provider. Left a message for the nurse to call me back, may be today or next week. I advised paperwork was due 10/14 so if someone could return my call sooner than later.

## 2021-10-26 ENCOUNTER — PROCEDURE VISIT (OUTPATIENT)
Dept: UROLOGY | Age: 51
End: 2021-10-26
Payer: COMMERCIAL

## 2021-10-26 VITALS
SYSTOLIC BLOOD PRESSURE: 130 MMHG | DIASTOLIC BLOOD PRESSURE: 82 MMHG | HEIGHT: 66 IN | BODY MASS INDEX: 50.62 KG/M2 | WEIGHT: 315 LBS | TEMPERATURE: 96.9 F

## 2021-10-26 DIAGNOSIS — N39.0 FREQUENT UTI: Primary | ICD-10-CM

## 2021-10-26 PROCEDURE — 52000 CYSTOURETHROSCOPY: CPT | Performed by: UROLOGY

## 2021-10-26 NOTE — PROGRESS NOTES
Cystoscopy Operative Note (10/26/21)  Surgeon: Jermaine Black MD  Anesthesia: Urethral 2% Xylocaine   Indications: Recurrent UTI  Position: Dorsal Lithotomy    Findings:   The patient was prepped and draped in the usual sterile fashion. The flexible cystoscope was advanced through the urethra and into the bladder. The bladder was thoroughly inspected and the following was noted:      Urethra: hypospadiac meatus noted at coronal sulcus. Scope was not able to be advanced into meatus. The cystoscope was removed. The patient tolerated the procedure well. Needs cysto with dilation under MAC.

## 2021-10-29 ENCOUNTER — TELEPHONE (OUTPATIENT)
Dept: UROLOGY | Age: 51
End: 2021-10-29

## 2021-10-29 NOTE — TELEPHONE ENCOUNTER
[facility] OR 11/12/21 Cysto, Urethral Dilation    PAT Phone call - 11/5/21 @ [phone]  Arrival time: 0630  Procedure time: 0830  NPO midnight  **No need to stop blood thinners**  VACCINATED  Patient given all information over the phone and information emailed to patient. Instructed to call the office with any other questions or concerns.

## 2021-11-05 ENCOUNTER — HOSPITAL ENCOUNTER (OUTPATIENT)
Dept: PREADMISSION TESTING | Age: 51
Discharge: HOME | End: 2021-11-09
Payer: COMMERCIAL

## 2021-11-05 VITALS — HEIGHT: 67 IN | BODY MASS INDEX: 49.44 KG/M2 | WEIGHT: 315 LBS

## 2021-11-05 NOTE — PROGRESS NOTES

## 2021-11-11 ENCOUNTER — ANESTHESIA EVENT (OUTPATIENT)
Dept: OPERATING ROOM | Age: 51
End: 2021-11-11
Payer: COMMERCIAL

## 2021-11-12 ENCOUNTER — HOSPITAL ENCOUNTER (OUTPATIENT)
Age: 51
Setting detail: OUTPATIENT SURGERY
Discharge: HOME | End: 2021-11-12
Attending: UROLOGY | Admitting: UROLOGY
Payer: COMMERCIAL

## 2021-11-12 ENCOUNTER — ANESTHESIA (OUTPATIENT)
Dept: OPERATING ROOM | Age: 51
End: 2021-11-12
Payer: COMMERCIAL

## 2021-11-12 VITALS
TEMPERATURE: 96.8 F | OXYGEN SATURATION: 99 % | BODY MASS INDEX: 49.44 KG/M2 | HEIGHT: 67 IN | WEIGHT: 315 LBS | RESPIRATION RATE: 14 BRPM | DIASTOLIC BLOOD PRESSURE: 58 MMHG | HEART RATE: 59 BPM | SYSTOLIC BLOOD PRESSURE: 120 MMHG

## 2021-11-12 VITALS
RESPIRATION RATE: 14 BRPM | DIASTOLIC BLOOD PRESSURE: 67 MMHG | SYSTOLIC BLOOD PRESSURE: 128 MMHG | OXYGEN SATURATION: 94 %

## 2021-11-12 LAB
-: ABNORMAL
AMORPHOUS: ABNORMAL
BACTERIA: ABNORMAL
BILIRUBIN URINE: NEGATIVE
CASTS UA: ABNORMAL /LPF
COLOR: YELLOW
COMMENT UA: ABNORMAL
CRYSTALS, UA: ABNORMAL /HPF
EPITHELIAL CELLS UA: ABNORMAL /HPF
GLUCOSE BLD-MCNC: 177 MG/DL (ref 75–110)
GLUCOSE BLD-MCNC: 188 MG/DL (ref 75–110)
GLUCOSE URINE: ABNORMAL
KETONES, URINE: NEGATIVE
LEUKOCYTE ESTERASE, URINE: ABNORMAL
MUCUS: ABNORMAL
NITRITE, URINE: POSITIVE
OTHER OBSERVATIONS UA: ABNORMAL
PH UA: 5.5 (ref 5–8)
PROTEIN UA: ABNORMAL
RBC UA: ABNORMAL /HPF
RENAL EPITHELIAL, UA: ABNORMAL /HPF
SPECIFIC GRAVITY UA: 1.02 (ref 1–1.03)
TRICHOMONAS: ABNORMAL
TURBIDITY: ABNORMAL
URINE HGB: ABNORMAL
UROBILINOGEN, URINE: NORMAL
WBC UA: ABNORMAL /HPF
YEAST: ABNORMAL

## 2021-11-12 PROCEDURE — 3600000012 HC SURGERY LEVEL 2 ADDTL 15MIN: Performed by: UROLOGY

## 2021-11-12 PROCEDURE — 2580000003 HC RX 258: Performed by: ANESTHESIOLOGY

## 2021-11-12 PROCEDURE — 7100000030 HC ASPR PHASE II RECOVERY - FIRST 15 MIN: Performed by: UROLOGY

## 2021-11-12 PROCEDURE — 87186 SC STD MICRODIL/AGAR DIL: CPT

## 2021-11-12 PROCEDURE — 7100000000 HC PACU RECOVERY - FIRST 15 MIN: Performed by: UROLOGY

## 2021-11-12 PROCEDURE — 3600000002 HC SURGERY LEVEL 2 BASE: Performed by: UROLOGY

## 2021-11-12 PROCEDURE — 7100000031 HC ASPR PHASE II RECOVERY - ADDTL 15 MIN: Performed by: UROLOGY

## 2021-11-12 PROCEDURE — 6360000002 HC RX W HCPCS: Performed by: UROLOGY

## 2021-11-12 PROCEDURE — 82947 ASSAY GLUCOSE BLOOD QUANT: CPT

## 2021-11-12 PROCEDURE — 6360000002 HC RX W HCPCS: Performed by: NURSE ANESTHETIST, CERTIFIED REGISTERED

## 2021-11-12 PROCEDURE — 3700000001 HC ADD 15 MINUTES (ANESTHESIA): Performed by: UROLOGY

## 2021-11-12 PROCEDURE — 2500000003 HC RX 250 WO HCPCS: Performed by: NURSE ANESTHETIST, CERTIFIED REGISTERED

## 2021-11-12 PROCEDURE — 7100000001 HC PACU RECOVERY - ADDTL 15 MIN: Performed by: UROLOGY

## 2021-11-12 PROCEDURE — 3700000000 HC ANESTHESIA ATTENDED CARE: Performed by: UROLOGY

## 2021-11-12 PROCEDURE — C1769 GUIDE WIRE: HCPCS | Performed by: UROLOGY

## 2021-11-12 PROCEDURE — 2720000010 HC SURG SUPPLY STERILE: Performed by: UROLOGY

## 2021-11-12 PROCEDURE — 7100000010 HC PHASE II RECOVERY - FIRST 15 MIN: Performed by: UROLOGY

## 2021-11-12 PROCEDURE — 87077 CULTURE AEROBIC IDENTIFY: CPT

## 2021-11-12 PROCEDURE — 87086 URINE CULTURE/COLONY COUNT: CPT

## 2021-11-12 PROCEDURE — 2709999900 HC NON-CHARGEABLE SUPPLY: Performed by: UROLOGY

## 2021-11-12 PROCEDURE — 81001 URINALYSIS AUTO W/SCOPE: CPT

## 2021-11-12 PROCEDURE — 7100000011 HC PHASE II RECOVERY - ADDTL 15 MIN: Performed by: UROLOGY

## 2021-11-12 PROCEDURE — 6370000000 HC RX 637 (ALT 250 FOR IP): Performed by: UROLOGY

## 2021-11-12 RX ORDER — SODIUM CHLORIDE 9 MG/ML
INJECTION, SOLUTION INTRAVENOUS CONTINUOUS
Status: DISCONTINUED | OUTPATIENT
Start: 2021-11-12 | End: 2021-11-12 | Stop reason: HOSPADM

## 2021-11-12 RX ORDER — LIDOCAINE HYDROCHLORIDE 10 MG/ML
INJECTION, SOLUTION EPIDURAL; INFILTRATION; INTRACAUDAL; PERINEURAL PRN
Status: DISCONTINUED | OUTPATIENT
Start: 2021-11-12 | End: 2021-11-12 | Stop reason: SDUPTHER

## 2021-11-12 RX ORDER — PROPOFOL 10 MG/ML
INJECTION, EMULSION INTRAVENOUS PRN
Status: DISCONTINUED | OUTPATIENT
Start: 2021-11-12 | End: 2021-11-12 | Stop reason: SDUPTHER

## 2021-11-12 RX ORDER — CEPHALEXIN 500 MG/1
500 CAPSULE ORAL 3 TIMES DAILY
Qty: 15 CAPSULE | Refills: 0 | Status: SHIPPED | OUTPATIENT
Start: 2021-11-12 | End: 2021-11-17

## 2021-11-12 RX ORDER — LIDOCAINE HYDROCHLORIDE 20 MG/ML
JELLY TOPICAL PRN
Status: DISCONTINUED | OUTPATIENT
Start: 2021-11-12 | End: 2021-11-12 | Stop reason: ALTCHOICE

## 2021-11-12 RX ORDER — LIDOCAINE HYDROCHLORIDE 20 MG/ML
JELLY TOPICAL CONTINUOUS PRN
Status: COMPLETED | OUTPATIENT
Start: 2021-11-12 | End: 2021-11-12

## 2021-11-12 RX ORDER — LIDOCAINE HYDROCHLORIDE 10 MG/ML
1 INJECTION, SOLUTION EPIDURAL; INFILTRATION; INTRACAUDAL; PERINEURAL
Status: DISCONTINUED | OUTPATIENT
Start: 2021-11-12 | End: 2021-11-12 | Stop reason: HOSPADM

## 2021-11-12 RX ORDER — KETAMINE HYDROCHLORIDE 50 MG/ML
INJECTION, SOLUTION, CONCENTRATE INTRAMUSCULAR; INTRAVENOUS PRN
Status: DISCONTINUED | OUTPATIENT
Start: 2021-11-12 | End: 2021-11-12 | Stop reason: SDUPTHER

## 2021-11-12 RX ORDER — MIDAZOLAM HYDROCHLORIDE 1 MG/ML
INJECTION INTRAMUSCULAR; INTRAVENOUS PRN
Status: DISCONTINUED | OUTPATIENT
Start: 2021-11-12 | End: 2021-11-12 | Stop reason: SDUPTHER

## 2021-11-12 RX ADMIN — MIDAZOLAM 2 MG: 1 INJECTION INTRAMUSCULAR; INTRAVENOUS at 07:58

## 2021-11-12 RX ADMIN — PROPOFOL 20 MG: 10 INJECTION, EMULSION INTRAVENOUS at 08:19

## 2021-11-12 RX ADMIN — PROPOFOL 30 MG: 10 INJECTION, EMULSION INTRAVENOUS at 08:16

## 2021-11-12 RX ADMIN — KETAMINE HYDROCHLORIDE 10 MG: 50 INJECTION, SOLUTION INTRAMUSCULAR; INTRAVENOUS at 08:09

## 2021-11-12 RX ADMIN — PROPOFOL 30 MG: 10 INJECTION, EMULSION INTRAVENOUS at 08:26

## 2021-11-12 RX ADMIN — PROPOFOL 30 MG: 10 INJECTION, EMULSION INTRAVENOUS at 08:27

## 2021-11-12 RX ADMIN — PROPOFOL 30 MG: 10 INJECTION, EMULSION INTRAVENOUS at 08:29

## 2021-11-12 RX ADMIN — KETAMINE HYDROCHLORIDE 20 MG: 50 INJECTION, SOLUTION INTRAMUSCULAR; INTRAVENOUS at 08:00

## 2021-11-12 RX ADMIN — SODIUM CHLORIDE: 9 INJECTION, SOLUTION INTRAVENOUS at 07:53

## 2021-11-12 RX ADMIN — PROPOFOL 20 MG: 10 INJECTION, EMULSION INTRAVENOUS at 08:09

## 2021-11-12 RX ADMIN — PROPOFOL 20 MG: 10 INJECTION, EMULSION INTRAVENOUS at 08:20

## 2021-11-12 RX ADMIN — LIDOCAINE HYDROCHLORIDE 40 MG: 10 INJECTION, SOLUTION EPIDURAL; INFILTRATION; INTRACAUDAL; PERINEURAL at 08:02

## 2021-11-12 RX ADMIN — PROPOFOL 20 MG: 10 INJECTION, EMULSION INTRAVENOUS at 08:06

## 2021-11-12 RX ADMIN — PROPOFOL 20 MG: 10 INJECTION, EMULSION INTRAVENOUS at 08:17

## 2021-11-12 RX ADMIN — PROPOFOL 30 MG: 10 INJECTION, EMULSION INTRAVENOUS at 08:22

## 2021-11-12 RX ADMIN — Medication 3000 MG: at 08:00

## 2021-11-12 RX ADMIN — PROPOFOL 20 MG: 10 INJECTION, EMULSION INTRAVENOUS at 08:10

## 2021-11-12 RX ADMIN — PROPOFOL 30 MG: 10 INJECTION, EMULSION INTRAVENOUS at 08:02

## 2021-11-12 RX ADMIN — KETAMINE HYDROCHLORIDE 10 MG: 50 INJECTION, SOLUTION INTRAMUSCULAR; INTRAVENOUS at 08:06

## 2021-11-12 RX ADMIN — PROPOFOL 30 MG: 10 INJECTION, EMULSION INTRAVENOUS at 08:21

## 2021-11-12 RX ADMIN — KETAMINE HYDROCHLORIDE 10 MG: 50 INJECTION, SOLUTION INTRAMUSCULAR; INTRAVENOUS at 08:10

## 2021-11-12 RX ADMIN — PROPOFOL 30 MG: 10 INJECTION, EMULSION INTRAVENOUS at 08:30

## 2021-11-12 RX ADMIN — PROPOFOL 20 MG: 10 INJECTION, EMULSION INTRAVENOUS at 08:31

## 2021-11-12 RX ADMIN — PROPOFOL 20 MG: 10 INJECTION, EMULSION INTRAVENOUS at 08:13

## 2021-11-12 ASSESSMENT — PULMONARY FUNCTION TESTS
PIF_VALUE: 1
PIF_VALUE: 1
PIF_VALUE: 2
PIF_VALUE: 1
PIF_VALUE: 0
PIF_VALUE: 1
PIF_VALUE: 0
PIF_VALUE: 1

## 2021-11-12 ASSESSMENT — ENCOUNTER SYMPTOMS
VOMITING: 0
SHORTNESS OF BREATH: 0
CONSTIPATION: 0
COUGH: 0
NAUSEA: 0
SORE THROAT: 0
WHEEZING: 0
RHINORRHEA: 0
DIARRHEA: 0
ABDOMINAL PAIN: 0
BLOOD IN STOOL: 0
TROUBLE SWALLOWING: 0

## 2021-11-12 ASSESSMENT — PAIN - FUNCTIONAL ASSESSMENT: PAIN_FUNCTIONAL_ASSESSMENT: 0-10

## 2021-11-12 ASSESSMENT — PAIN SCALES - GENERAL
PAINLEVEL_OUTOF10: 0
PAINLEVEL_OUTOF10: 0

## 2021-11-12 NOTE — ANESTHESIA POSTPROCEDURE EVALUATION
Department of Anesthesiology  Postprocedure Note    Patient: Rosey Alatorre  MRN: [id number]  YOB: 1970  Date of evaluation: 11/12/2021  Time:  10:57 AM     Procedure Summary     Date: 11/12/21 Room / Location: [facility] [address] / [address]     Anesthesia Start: 5246 Anesthesia Stop: 0840    Procedure: CYSTOSCOPY URETHRAL DILATATION (N/A Urethra) Diagnosis:       (URETHRAL STRICTURE)      (PT VACCINATED)    Surgeons: Stephanie Lambert MD Responsible Provider: Davis Carter MD    Anesthesia Type: general ASA Status: 3          Anesthesia Type: general    David Phase I: David Score: 9    David Phase II: David Score: 10    Last vitals: Reviewed and per EMR flowsheets.        Anesthesia Post Evaluation    Comments: POST- ANESTHESIA EVALUATION       Pt Name: Rosey Alatorre  MRN: [id number]  YOB: 1970  Date of evaluation: 11/12/2021  Time:  10:58 AM      BP (!) 120/58   Pulse 59   Temp 96.8 °F (36 °C) (Infrared)   Resp 14   Ht 5' 7\" (1.702 m)   Wt (!) 380 lb (172.4 kg)   SpO2 99%   BMI 59.52 kg/m²      Consciousness Level  Awake  Cardiopulmonary Status  Stable  Pain Adequately Treated YES  Nausea / Vomiting  NO  Adequate Hydration  YES  Anesthesia Related Complications NONE      Electronically signed by Davis Carter MD on 11/12/2021 at 10:58 AM

## 2021-11-12 NOTE — ANESTHESIA PRE PROCEDURE
Department of Anesthesiology  Preprocedure Note       Name:  Salvatore Blake   Age:  49 y.o.  :  1970                                          MRN:  [id number]         Date:  2021      Surgeon: Zarina German):  Chicho Chatman MD    Procedure: Procedure(s):  CYSTOSCOPY URETHRAL DILATATION    Medications prior to admission:   Prior to Admission medications    Medication Sig Start Date End Date Taking? Authorizing Provider   cetirizine (ZYRTEC) 10 MG tablet Take 10 mg by mouth daily   Yes Historical Provider, MD   empagliflozin (JARDIANCE) 25 MG tablet Take 25 mg by mouth daily   Yes Historical Provider, MD   alogliptin (NESINA) 25 MG TABS tablet Take 25 mg by mouth daily   Yes Historical Provider, MD   metFORMIN (GLUCOPHAGE) 1000 MG tablet Take 1,000 mg by mouth 2 times daily (with meals)   Yes Historical Provider, MD   nystatin (MYCOSTATIN) 369972 UNIT/GM powder Apply 2 times daily for 10 days. 21  Yes Chicho Chatman MD   diclofenac sodium (VOLTAREN) 1 % GEL  2/3/20  Yes Historical Provider, MD   buPROPion (WELLBUTRIN XL) 300 MG extended release tablet TAKE 1 TABLET BY MOUTH  EVERY MORNING 19  Yes Tamara Mancilla MD   spironolactone (ALDACTONE) 50 MG tablet TAKE 1 TABLET BY MOUTH TWO  TIMES DAILY 10/28/19  Yes Tamara Mancilla MD   omeprazole (PRILOSEC) 20 MG delayed release capsule TAKE 1 CAPSULE BY MOUTH  DAILY 19  Yes Tamara Mancilla MD   simvastatin (ZOCOR) 40 MG tablet Take 1 tablet by mouth  nightly 19  Yes Tamara Mancilla MD   Multiple Vitamin (ONE-A-DAY MENS PO) Take by mouth daily   Yes Historical Provider, MD   albuterol sulfate  (90 Base) MCG/ACT inhaler Inhale 2 puffs into the lungs every 6 hours as needed for Shortness of Breath 18  Yes Tamara Mancilla MD   Misc Natural Products (GLUCOSAMINE CHONDROITIN ADV) TABS Take by mouth   Yes Historical Provider, MD   CRANBERRY 1,680 mg by Does not apply route.    Yes Historical Provider, MD   FISH OIL Take 1,000 mg by mouth daily. Please get dosage   Yes Historical Provider, MD   VITAMIN D PO Take 2,000 Int'l Units by mouth Please get dosage     Yes Historical Provider, MD   aspirin 81 MG tablet Take 81 mg by mouth daily. Yes Historical Provider, MD   Elastic Bandages & Supports (V-2 HIGH COMPRESSION HOSE) MISC 30-40mmHg pressure stockings, knee high. Wear 12 hours while awake and remove at bedtime 3/22/18   Tamara Rodriguez MD   Blood Glucose Monitoring Suppl ([place] CONTOUR MONITOR) W/DEVICE KIT use twice a day 4/17/17   Historical Provider, MD   MICROLET LANCETS MISC use twice a day 4/17/17   Historical Provider, MD   fluticasone (FLONASE) 50 MCG/ACT SUSP 1 spray by Nasal route 2 times daily  Patient taking differently: 1 spray by Nasal route as needed  7/12/15   Kade Cabrera MD   glucose blood VI test strips (BL TEST STRIP PACK) strip 1 each by Does not apply route 2 times daily. As needed. 5/16/14   Neel Gutierres MD   nitroGLYCERIN (NITROSTAT) 0.4 MG SL tablet Place 0.4 mg under the tongue every 5 minutes as needed.     Historical Provider, MD       Current medications:    Current Facility-Administered Medications   Medication Dose Route Frequency Provider Last Rate Last Admin    0.9 % sodium chloride infusion   IntraVENous Continuous Heriberto Ty MD        lidocaine PF 1 % injection 1 mL  1 mL IntraDERmal Once PRN Gasper Mayo MD        ceFAZolin (ANCEF) 3000 mg in dextrose 5 % 100 mL IVPB  3,000 mg IntraVENous Once Yao Carias MD           Allergies:  No Known Allergies    Problem List:    Patient Active Problem List   Diagnosis Code    Hyperlipidemia E78.5    MVP (mitral valve prolapse) I34.1    Sleep apnea G47.30    Diabetes mellitus (HCC) E11.9    GERD (gastroesophageal reflux disease) K21.9    Asthma N45.873    Nonalcoholic hepatosteatosis C29.3    Constipation K59.00    Gastroparesis K31.84    Mixed hyperlipidemia E78.2    Essential hypertension I10    Recurrent nephrolithiasis N20.0  Complicated UTI (urinary tract infection) N39.0    Cellulitis of left lower extremity L03.116       Past Medical History:        Diagnosis Date    Acute pyelonephritis 10/30/2018    Anemia     Asthma     Cellulitis of left lower extremity     Complicated UTI (urinary tract infection) 10/01/2021    Essential hypertension 2016    Frequent UTI     GERD (gastroesophageal reflux disease)     Hyperlipidemia     Left ventricular enlargement     See ECHO 2019    Liver disease     MRSA infection     Left knee, states \"rare strain\"    MVP (mitral valve prolapse)     Nonalcoholic hepatosteatosis 1608    Recurrent nephrolithiasis 10/31/2018    Screening PSA (prostate specific antigen) 2009    Skipped beats     Patient states skipped beats or extra beats noted in the past    Type 2 diabetes mellitus ([facility]     Unspecified sleep apnea     BiPAP    Urethral stricture        Past Surgical History:        Procedure Laterality Date    ANTERIOR CRUCIATE LIGAMENT REPAIR Left 2009    CYSTOSCOPY      MANDIBLE SURGERY      D/t cross bite, \"broke jaw\", moved teeth out, also had braces    OTHER SURGICAL HISTORY      Surgery at 25 months for hypospadias    TONSILLECTOMY      UPPP UVULOPALATOPHARYGOPLASTY      W/tonsillectomy       Social History:    Social History     Tobacco Use    Smoking status: Former Smoker     Packs/day: 0.00     Years: 1.00     Pack years: 0.00     Types: Cigarettes     Quit date: 2015     Years since quittin.3    Smokeless tobacco: Never Used   Substance Use Topics    Alcohol use:  Yes     Alcohol/week: 0.0 standard drinks     Comment: 1-2/month                                Counseling given: Not Answered      Vital Signs (Current):   Vitals:    21 0709   BP: (!) 120/47   Pulse: 64   Resp: 20   Temp: 97.7 °F (36.5 °C)   TempSrc: Infrared   SpO2: 98%   Weight: (!) 380 lb (172.4 kg)   Height: 5' 7\" (1.702 m) BP Readings from Last 3 Encounters:   11/12/21 (!) 120/47   10/26/21 130/82   10/05/21 (!) 118/56       NPO Status:                                                                                 BMI:   Wt Readings from Last 3 Encounters:   11/12/21 (!) 380 lb (172.4 kg)   11/05/21 (!) 380 lb (172.4 kg)   10/26/21 (!) 399 lb (181 kg)     Body mass index is 59.52 kg/m². CBC:   Lab Results   Component Value Date    WBC 11.8 10/05/2021    RBC 3.37 10/05/2021    HGB 8.4 10/05/2021    HCT 26.6 10/05/2021    MCV 78.8 10/05/2021    RDW 15.6 10/05/2021     10/05/2021       CMP:   Lab Results   Component Value Date     10/05/2021    K 4.2 10/05/2021     10/05/2021    CO2 19 10/05/2021    BUN 17 10/05/2021    CREATININE 1.07 10/05/2021    GFRAA >60 10/05/2021    LABGLOM >60 10/05/2021    GLUCOSE 160 10/05/2021    PROT 7.6 10/01/2021    CALCIUM 8.7 10/05/2021    BILITOT 0.29 10/01/2021    ALKPHOS 100 10/01/2021    AST 21 10/01/2021    ALT 22 10/01/2021       POC Tests: No results for input(s): POCGLU, POCNA, POCK, POCCL, POCBUN, POCHEMO, POCHCT in the last 72 hours.     Coags:   Lab Results   Component Value Date    PROTIME 10.1 07/12/2015    INR 0.9 07/12/2015    APTT 24.1 07/12/2015       HCG (If Applicable): No results found for: PREGTESTUR, PREGSERUM, HCG, HCGQUANT     ABGs: No results found for: PHART, PO2ART, VWN8BLN, CPK2WUK, BEART, L1MCYURY     Type & Screen (If Applicable):  No results found for: LABABO, LABRH    Drug/Infectious Status (If Applicable):  No results found for: HIV, HEPCAB    COVID-19 Screening (If Applicable):   Lab Results   Component Value Date    COVID19 Not Detected 10/01/2021           Anesthesia Evaluation  Patient summary reviewed and Nursing notes reviewed no history of anesthetic complications:   Airway: Mallampati: I  TM distance: >3 FB   Neck ROM: full  Mouth opening: > = 3 FB Dental: normal exam         Pulmonary:normal exam  breath sounds clear to auscultation  (+) sleep apnea:  asthma:                            Cardiovascular:    (+) hypertension:, dysrhythmias:, hyperlipidemia      ECG reviewed  Rhythm: regular  Rate: normal  Echocardiogram reviewed                  Neuro/Psych:   Negative Neuro/Psych ROS              GI/Hepatic/Renal:   (+) GERD: well controlled, liver disease:, morbid obesity         ROS comment: URETHRAL STRICTURE . Endo/Other:    (+) DiabetesType II DM, , blood dyscrasia: anemia:., .                 Abdominal:             Vascular: Other Findings:           Anesthesia Plan      general     ASA 3     (GA with TIVA)  Induction: intravenous. MIPS: Postoperative opioids intended and Prophylactic antiemetics administered. Anesthetic plan and risks discussed with patient. Plan discussed with CRNA.                 Humberto Awad MD   11/12/2021

## 2021-11-12 NOTE — BRIEF OP NOTE
Brief Postoperative Note      Patient: Bessie Canavan  YOB: 1970  MRN: [id number]    Date of Procedure: 11/12/2021    Pre-Op Diagnosis: URETHRAL STRICTURE  PT VACCINATED    Post-Op Diagnosis: Same       Procedure(s):  CYSTOSCOPY URETHRAL DILATATION    Surgeon(s):  Eliseo Putnam MD    Assistant:  * No surgical staff found *    Anesthesia: Monitor Anesthesia Care    Estimated Blood Loss (mL): Minimal    Complications: None    Specimens:   ID Type Source Tests Collected by Time Destination   1 : OBTAINED VIA CYSTOSCOPE Urine Bladder URINE RT REFLEX TO CULTURE Eliseo Putnam MD 11/12/2021 6664        Implants:  * No implants in log *      Drains: * No LDAs found *    Findings: stricture noted at meatus was pinpoint, after meatal dilation, urethral dilation was carried out to 18 Fr. Cysto showed normal bladder, urine culture sent.      Electronically signed by Eliseo Putnam MD on 11/12/2021 at 8:37 AM

## 2021-11-12 NOTE — H&P
HISTORY and [facility] [id number]       NAME:  Adeline Barrera  MRN: [id number]   YOB: 1970   Date: 11/12/2021   Age: 49 y.o. Gender: male     COMPLAINT AND PRESENT HISTORY:   Adeline Barrera is 49 y.o.,  male, undergoing CYSTOSCOPY URETHRAL DILATATION for URETHRAL STRICTURE. Patient states that over the past couple of months has had recurrent UTI's, dysuria, slow stream, feeling of incomplete emptying. +frequency. Denies hematuria. + flank pain- left side, denies pelvic pain. Denies vomiting, had some nausea end of August, early September since resolved. Denies current fever/chills. + hx of kidney stone, pyelonephritis. He was previously tx w/ATB's- no longer. Review of additional significant medical hx:  ASTHMA: Denies current SOB, wheezing, cough. Current medications r/t condition: ALBUTEROL    DIABETES: States BS typically runs 140-160. Denies hypoglycemia currently. Lab Results   Component Value Date    LABA1C 11.0 (H) 10/03/2021     Lab Results   Component Value Date     10/03/2021   Current medications r/t condition: Orlena Daub, METFORMIN. GERD: Denies dysphagia, pharyngitis, voice change. Current medications r/t condition: OMEPRAZOLE    HLD, MVP, HTN: Denies current chest pain, palpitations, SOB, dizziness, headache. Patient states hx of skipped beats or extra beats, previously following w/cardiology- states he needs new specialist.  Current medications r/t condition: SPIRONOLACTONE, ZOCOR, NITRO PRN    SLEEP APNEA, hx of UPPP: Uses BiPAP nightly. LIVER DISEASE: Patient thinks fatty liver possibly. *Recently hospitalized for cellulitis- states ATB's completed. C/o bumps on ABD possibly r/t anticoagulant injections. NPO status: Patient states they have been NPO since before midnight. Medications taken TODAY (with sip of water): None. Anticoagulation status: Maintained on daily baby ASA- last dose was yesterday.     Denies personal hx of blood clots. POSITIVE personal hx of MRSA infection. Denies any personal or family hx of previous complications w/anesthesia. PAST MEDICAL HISTORY     Past Medical History:   Diagnosis Date    Acute pyelonephritis 10/30/2018    Anemia     Asthma     Cellulitis of left lower extremity     Complicated UTI (urinary tract infection) 10/01/2021    Essential hypertension 2016    Frequent UTI     GERD (gastroesophageal reflux disease)     Hyperlipidemia     Left ventricular enlargement     See ECHO 2019    Liver disease     MRSA infection     Left knee, states \"rare strain\"    MVP (mitral valve prolapse)     Nonalcoholic hepatosteatosis 2188    Recurrent nephrolithiasis 10/31/2018    Screening PSA (prostate specific antigen) 2009    Skipped beats     Patient states skipped beats or extra beats noted in the past    Type 2 diabetes mellitus ([facility]     Unspecified sleep apnea     BiPAP    Urethral stricture        SURGICAL HISTORY       Past Surgical History:   Procedure Laterality Date    ANTERIOR CRUCIATE LIGAMENT REPAIR Left 2009    CYSTOSCOPY      MANDIBLE SURGERY      D/t cross bite, \"broke jaw\", moved teeth out, also had braces    OTHER SURGICAL HISTORY      Surgery at 25 months for hypospadias    TONSILLECTOMY  2005    UPPP UVULOPALATOPHARYGOPLASTY      W/tonsillectomy       SOCIAL HISTORY       Social History     Socioeconomic History    Marital status:      Spouse name: None    Number of children: None    Years of education: None    Highest education level: None   Occupational History    None   Tobacco Use    Smoking status: Former Smoker     Packs/day: 0.00     Years: 1.00     Pack years: 0.00     Types: Cigarettes     Quit date: 2015     Years since quittin.3    Smokeless tobacco: Never Used   Substance and Sexual Activity    Alcohol use:  Yes     Alcohol/week: 0.0 standard drinks     Comment: 1-2/month    Drug use: No    Sexual activity: Yes   Other Topics Concern    None   Social History Narrative    None     Social Determinants of Health     Financial Resource Strain:     Difficulty of Paying Living Expenses: Not on file   Food Insecurity:     Worried About Running Out of Food in the Last Year: Not on file    Juan of Food in the Last Year: Not on file   Transportation Needs:     Lack of Transportation (Medical): Not on file    Lack of Transportation (Non-Medical): Not on file   Physical Activity:     Days of Exercise per Week: Not on file    Minutes of Exercise per Session: Not on file   Stress:     Feeling of Stress : Not on file   Social Connections:     Frequency of Communication with Friends and Family: Not on file    Frequency of Social Gatherings with Friends and Family: Not on file    Attends Sabianist Services: Not on file    Active Member of [address] [place] [address] or Organizations: Not on file    Attends Club or Organization Meetings: Not on file    Marital Status: Not on file   Intimate Partner Violence:     Fear of Current or Ex-Partner: Not on file    Emotionally Abused: Not on file    Physically Abused: Not on file    Sexually Abused: Not on file   Housing Stability:     Unable to Pay for Housing in the Last Year: Not on file    Number of [place] in the Last Year: Not on file    Unstable Housing in the Last Year: Not on file       REVIEW OF SYSTEMS    No Known Allergies    No current facility-administered medications on file prior to encounter.      Current Outpatient Medications on File Prior to Encounter   Medication Sig Dispense Refill    cetirizine (ZYRTEC) 10 MG tablet Take 10 mg by mouth daily      empagliflozin (JARDIANCE) 25 MG tablet Take 25 mg by mouth daily      alogliptin (NESINA) 25 MG TABS tablet Take 25 mg by mouth daily      metFORMIN (GLUCOPHAGE) 1000 MG tablet Take 1,000 mg by mouth 2 times daily (with meals)      nystatin (MYCOSTATIN) 188924 UNIT/GM powder Apply 2 times daily for 10 days. 1 Bottle 3    diclofenac sodium (VOLTAREN) 1 % GEL       buPROPion (WELLBUTRIN XL) 300 MG extended release tablet TAKE 1 TABLET BY MOUTH  EVERY MORNING 90 tablet 1    spironolactone (ALDACTONE) 50 MG tablet TAKE 1 TABLET BY MOUTH TWO  TIMES DAILY 180 tablet 1    omeprazole (PRILOSEC) 20 MG delayed release capsule TAKE 1 CAPSULE BY MOUTH  DAILY 90 capsule 1    simvastatin (ZOCOR) 40 MG tablet Take 1 tablet by mouth  nightly 90 tablet 1    Multiple Vitamin (ONE-A-DAY MENS PO) Take by mouth daily      albuterol sulfate  (90 Base) MCG/ACT inhaler Inhale 2 puffs into the lungs every 6 hours as needed for Shortness of Breath 1 Inhaler 5    Misc Natural Products (GLUCOSAMINE CHONDROITIN ADV) TABS Take by mouth      Elastic Bandages & Supports (V-2 HIGH COMPRESSION HOSE) MISC 30-40mmHg pressure stockings, knee high. Wear 12 hours while awake and remove at bedtime 2 each 0    Blood Glucose Monitoring Suppl ([employer] CONTOUR MONITOR) W/DEVICE KIT use twice a day  0    MICROLET LANCETS MISC use twice a day  0    fluticasone (FLONASE) 50 MCG/ACT SUSP 1 spray by Nasal route 2 times daily (Patient taking differently: 1 spray by Nasal route as needed ) 1 Bottle 0    glucose blood VI test strips (BL TEST STRIP PACK) strip 1 each by Does not apply route 2 times daily. As needed. 100 each 3    nitroGLYCERIN (NITROSTAT) 0.4 MG SL tablet Place 0.4 mg under the tongue every 5 minutes as needed.  CRANBERRY 1,680 mg by Does not apply route.  FISH OIL Take 1,000 mg by mouth daily. Please get dosage      VITAMIN D PO Take 2,000 Int'l Units by mouth Please get dosage        aspirin 81 MG tablet Take 81 mg by mouth daily. (Notation: Medications listed above are not currently reconciled at the signing of this H&P note, to be reconciled in pre-op per RN)    Review of Systems   Constitutional: Negative for chills and fever.    HENT: Negative for congestion, ear pain, rhinorrhea, sore throat and trouble swallowing. Respiratory: Negative for cough, shortness of breath and wheezing. Cardiovascular: Positive for leg swelling. Negative for chest pain and palpitations. Gastrointestinal: Negative for abdominal pain, blood in stool, constipation, diarrhea, nausea and vomiting. Genitourinary: Positive for difficulty urinating, dysuria, flank pain (Left) and frequency. Negative for hematuria, penile discharge, penile pain, penile swelling, scrotal swelling and testicular pain. SEE HPI. Neurological: Negative for dizziness and headaches. Hematological: Does not bruise/bleed easily. GENERAL PHYSICAL EXAM     Vitals: Review current vital signs per RN flow sheet. GENERAL APPEARANCE:   Yuri Godwin is 49 y.o.,  male, severely obese, nourished, conscious, alert. Does not appear to be in any distress or pain at this time. Physical Exam  Constitutional:       General: He is not in acute distress. Appearance: He is well-developed. He is morbidly obese. He is not ill-appearing, toxic-appearing or diaphoretic. HENT:      Head: Normocephalic. Right Ear: External ear normal.      Left Ear: External ear normal.      Nose: Nose normal.      Mouth/Throat:      Pharynx: No oropharyngeal exudate or posterior oropharyngeal erythema. Tonsils: No tonsillar abscesses. Comments: Uvula surgically absent. Eyes:      General:         Right eye: No discharge. Left eye: No discharge. Cardiovascular:      Rate and Rhythm: Normal rate and regular rhythm. Pulses: Intact distal pulses. Heart sounds: Normal heart sounds. Comments: x1 skipped beat noted, over all rhythm regular. Pulmonary:      Effort: Pulmonary effort is normal. No accessory muscle usage or respiratory distress. Breath sounds: Normal breath sounds. No decreased breath sounds, wheezing, rhonchi or rales.    Abdominal:      General: Bowel sounds are normal. There is no distension. Palpations: Abdomen is soft. There is no mass. Tenderness: There is no abdominal tenderness. There is no guarding or rebound. Comments: x3 scattered pea sized hardened, movable masses- patient states from anticoagulant injections while admitted- advised f/u with PCP. No erythema/warmth/drainage. Small scab, slightly erythematous proximal to umbilicus. Genitourinary:     Comments: Deferred to attending. Musculoskeletal:      Right lower leg: No tenderness. 1+ Pitting Edema present. Left lower leg: No tenderness. 1+ Pitting Edema present. Comments: Negative Tamara's sign b/l.  +1 pitting edema to b/l LE's (left slightly worse than right), no erythema/no warmth. Skin:     General: Skin is warm and dry. Neurological:      Mental Status: He is alert and oriented to person, place, and time.    Psychiatric:         Behavior: Behavior normal.         RECENT LAB WORK     Lab Results   Component Value Date     10/05/2021    K 4.2 10/05/2021     (H) 10/05/2021    CO2 19 (L) 10/05/2021    BUN 17 10/05/2021    CREATININE 1.07 10/05/2021    GLUCOSE 160 (H) 10/05/2021    CALCIUM 8.7 10/05/2021    PROT 7.6 10/01/2021    LABALBU 3.7 10/01/2021    BILITOT 0.29 (L) 10/01/2021    ALKPHOS 100 10/01/2021    AST 21 10/01/2021    ALT 22 10/01/2021    LABGLOM >60 10/05/2021    GFRAA >60 10/05/2021    GLOB NOT REPORTED 10/25/2013     Lab Results   Component Value Date    WBC 11.8 (H) 10/05/2021    HGB 8.4 (L) 10/05/2021    HCT 26.6 (L) 10/05/2021    MCV 78.8 (L) 10/05/2021     10/05/2021     PROVISIONAL DIAGNOSES / SURGERY:      URETHRAL STRICTURE    CYSTOSCOPY URETHRAL DILATATION     Patient Active Problem List    Diagnosis Date Noted    Cellulitis of left lower extremity 16/53/0345    Complicated UTI (urinary tract infection) 10/01/2021    Recurrent nephrolithiasis 10/31/2018    Mixed hyperlipidemia 11/21/2016    Essential hypertension 58/54/2992    Nonalcoholic hepatosteatosis 08/02/2013    Constipation 08/02/2013    Gastroparesis 08/02/2013    Asthma 07/05/2013    Hyperlipidemia     MVP (mitral valve prolapse)     Sleep apnea     [facility]     GERD (gastroesophageal reflux disease)            VALERIE Truong - CNP on 11/12/2021 at 7:05 AM

## 2021-11-13 NOTE — OP NOTE
[address]                 [address] [facility], [address]                                OPERATIVE REPORT    PATIENT NAME: Jeanine Portillo                     :        1970  MED REC NO:   [id number]                              ROOM:  ACCOUNT NO:   [de-identified]                           ADMIT DATE: 2021  PROVIDER:     Trinh Thakur    DATE OF PROCEDURE:  2021    PREOPERATIVE DIAGNOSES:  Microhematuria and fossa navicularis stricture. POSTOPERATIVE DIAGNOSES:  Microhematuria and fossa navicularis  stricture. PROCEDURE PERFORMED:  1. Fossa navicularis dissection. 2.  Cystoscopy. SURGEON:  Trinh Thakur MD    ANESTHESIA:  MAC.    COMPLICATIONS:  None. BLEEDING:  None. DRAINS:  None. HISTORY OF PRESENT ILLNESS:  The patient is a 30-year-old male who has a  history of recurrent urinary tract infections and urethral stricture. He has a hypospadiac urethral meatus which was found to be stenotic in  its distal most portion as he had a reconstruction done as a child. An  attempted cystoscopy in the office was unsuccessful. As a result, he is  here today for urethral dilation and cystoscopic evaluation to evaluate  the recurrent UTIs. PROCEDURE IN DETAIL:  The patient was brought back to the operating room  and laid on the operating table in the supine position. Once MAC  anesthesia was obtained, he was placed in the frog-leg position. He was  prepped and draped in the usual sterile fashion. I attempted to advance  the cystoscope through _____ meatus and was unsuccessful. I then used a  Glidewire and advanced it through the meatus into the bladder. Using  the S current dilators starting at 8-Malawian, I was able to sequentially  dilate the fossa navicularis stricture. In addition, I did notice  tightness throughout pan urethra, but ultimately as I sequentially  dilated I was able to dilate up to roughly 20-Malawian.   The last easy  dilation was an 18-Qatari, by 20-Qatari it was becoming quite tight. As  a result, evaluations were completed. I then advanced the cystoscope  next to the wire past the area of stricture and into the bladder. The  bladder was visualized in its entirety and did not appear significantly  overt full. Urine was collected and sent for culture and sensitivity. No other abnormalities in the bladder were seen. The cystoscope was  removed. The urethra was carefully visualized and the only identifiable  stricture was at the fossa navicularis. At that point, the procedure  was completed. He awoke from anesthesia without any complications and  was taken back to postoperative anesthesia care in good condition. He  will be discharged home today. Pending the results of his urine  culture, we will see if he needs antibiotics. If his urethral stricture  worsens, I will send him to an urethral stricture specialist for  possible reconstruction, but at this point he is doing relatively well.         Raghavendra Rg    D: 11/13/2021 0:22:48       T: 11/13/2021 0:25:07     [email or web address]  Job#: [id number]     Doc#: [id number]    CC:

## 2021-11-14 LAB
CULTURE: ABNORMAL
Lab: ABNORMAL
SPECIMEN DESCRIPTION: ABNORMAL

## 2021-11-16 RX ORDER — SULFAMETHOXAZOLE AND TRIMETHOPRIM 800; 160 MG/1; MG/1
1 TABLET ORAL 2 TIMES DAILY
Qty: 14 TABLET | Refills: 0 | Status: SHIPPED | OUTPATIENT
Start: 2021-11-16 | End: 2021-11-23

## 2021-11-24 ENCOUNTER — TELEPHONE (OUTPATIENT)
Dept: UROLOGY | Age: 51
End: 2021-11-24

## 2022-01-04 ENCOUNTER — OFFICE VISIT (OUTPATIENT)
Dept: UROLOGY | Age: 52
End: 2022-01-04
Payer: COMMERCIAL

## 2022-01-04 ENCOUNTER — HOSPITAL ENCOUNTER (OUTPATIENT)
Age: 52
Setting detail: SPECIMEN
Discharge: HOME | End: 2022-01-04

## 2022-01-04 VITALS
HEIGHT: 67 IN | TEMPERATURE: 97.3 F | DIASTOLIC BLOOD PRESSURE: 82 MMHG | WEIGHT: 315 LBS | BODY MASS INDEX: 49.44 KG/M2 | SYSTOLIC BLOOD PRESSURE: 130 MMHG

## 2022-01-04 DIAGNOSIS — R21 GROIN RASH: ICD-10-CM

## 2022-01-04 DIAGNOSIS — N39.0 FREQUENT UTI: ICD-10-CM

## 2022-01-04 DIAGNOSIS — Q54.1 PENILE HYPOSPADIAS: ICD-10-CM

## 2022-01-04 DIAGNOSIS — N39.0 FREQUENT UTI: Primary | ICD-10-CM

## 2022-01-04 PROCEDURE — 1036F TOBACCO NON-USER: CPT | Performed by: UROLOGY

## 2022-01-04 PROCEDURE — G8427 DOCREV CUR MEDS BY ELIG CLIN: HCPCS | Performed by: UROLOGY

## 2022-01-04 PROCEDURE — G8484 FLU IMMUNIZE NO ADMIN: HCPCS | Performed by: UROLOGY

## 2022-01-04 PROCEDURE — 99214 OFFICE O/P EST MOD 30 MIN: CPT | Performed by: UROLOGY

## 2022-01-04 PROCEDURE — 3017F COLORECTAL CA SCREEN DOC REV: CPT | Performed by: UROLOGY

## 2022-01-04 PROCEDURE — 81002 URINALYSIS NONAUTO W/O SCOPE: CPT | Performed by: UROLOGY

## 2022-01-04 PROCEDURE — G8417 CALC BMI ABV UP PARAM F/U: HCPCS | Performed by: UROLOGY

## 2022-01-04 RX ORDER — SULFAMETHOXAZOLE AND TRIMETHOPRIM 800; 160 MG/1; MG/1
1 TABLET ORAL 2 TIMES DAILY
Qty: 14 TABLET | Refills: 0 | Status: SHIPPED | OUTPATIENT
Start: 2022-01-04 | End: 2022-01-11

## 2022-01-04 RX ORDER — SPIRONOLACTONE 25 MG/1
50 TABLET ORAL 2 TIMES DAILY
COMMUNITY
Start: 2021-11-09 | End: 2022-09-19

## 2022-01-04 ASSESSMENT — ENCOUNTER SYMPTOMS
EYE REDNESS: 0
EYE PAIN: 0
SHORTNESS OF BREATH: 0
DIARRHEA: 0
WHEEZING: 0
NAUSEA: 1
CONSTIPATION: 0
ABDOMINAL PAIN: 0
VOMITING: 0
COUGH: 0
BACK PAIN: 1

## 2022-01-04 NOTE — PROGRESS NOTES
Review of Systems   Constitutional: Negative for chills, fatigue and fever. Eyes: Negative for pain, redness and visual disturbance. Respiratory: Negative for cough, shortness of breath and wheezing. Cardiovascular: Negative for chest pain and leg swelling. Gastrointestinal: Positive for nausea. Negative for abdominal pain, constipation, diarrhea and vomiting. Genitourinary: Positive for difficulty urinating, dysuria, frequency and urgency. Negative for flank pain, hematuria, scrotal swelling and testicular pain. Musculoskeletal: Positive for back pain (Left ). Negative for joint swelling and myalgias. Skin: Positive for rash. Negative for wound. Neurological: Negative for dizziness, tremors, weakness and numbness. Hematological: Does not bruise/bleed easily.

## 2022-01-04 NOTE — PROGRESS NOTES
[address] [facility] [id number]  Dept: [address] [place], [place] [address]    Patient:  Yomaira Ness  YOB: 1970  Date: 1/4/2022    The patient is a 49 y.o. male who presents todayfor evaluation of the following problems:   Chief Complaint   Patient presents with    Check-Up     swelling of private area. since [address] last couple of weeks he has notice a lump under penis, straining to urinate        HPI  He has a history of hypospadias. He had a cysto and a urethral dilation recently and did well initially. He has been feeling a lump on the shaft of his penis. He has a rash in his groin as well. Summary of old records: N/A    Additional History: N/A    Procedures Today: N/A    Urinalysis today:  No results found for this visit on 01/04/22. Last several PSA's:  No results found for: PSA  Last total testosterone:  No results found for: TESTOSTERONE    AUA Symptom Score (1/4/2022):   INCOMPLETE EMPTYING: How often have you had the sensation of not emptying your bladder?: Not at all  FREQUENCY: How often do you have to urinate less than every two hours?: Not at all  INTERMITTENCY: How often have you found you stopped and started again several times when you urinated?: About Half the time  URGENCY: How often have you found it difficult to postpone urination?: Almost always  WEAK STREAM: How often have you had a weak urinary stream?: About Half the time  STRAINING: How often have you had to strain to start  urination?: Almost always  NOCTURIA: How many times did you typically get up at night to uriniate?: 1 Time  TOTAL I-PSS SCORE[de-identified] 17  How would you feel if you were to spend the rest of your life with your urinary condition?: Unhappy    Last BUN and creatinine:  Lab Results   Component Value Date    BUN 17 10/05/2021     Lab Results Component Value Date    CREATININE 1.07 10/05/2021       Additional Lab/Culture results: none    Imaging Reviewed during this Office Visit: none  (results were independently reviewed by physician and radiology report verified)    PAST MEDICAL, FAMILY AND SOCIAL HISTORY UPDATE:  Past Medical History:   Diagnosis Date    Acute pyelonephritis 10/30/2018    Anemia     Asthma     Cellulitis of left lower extremity 74/19/0969    Complicated UTI (urinary tract infection) 10/01/2021    Essential hypertension 11/21/2016    Frequent UTI     GERD (gastroesophageal reflux disease)     Hyperlipidemia     Left ventricular enlargement     See ECHO 11/06/2019    Liver disease     MRSA infection     Left knee, states \"rare strain\"    MVP (mitral valve prolapse)     Nonalcoholic hepatosteatosis 2/7/5898    Recurrent nephrolithiasis 10/31/2018    Screening PSA (prostate specific antigen) 07/13/2009    Skipped beats     Patient states skipped beats or extra beats noted in the past    Type 2 diabetes mellitus ([facility]     Unspecified sleep apnea     BiPAP    Urethral stricture      Past Surgical History:   Procedure Laterality Date    ANTERIOR CRUCIATE LIGAMENT REPAIR Left 02/2009    CYSTOSCOPY      CYSTOSCOPY N/A 11/12/2021    CYSTOSCOPY URETHRAL DILATATION performed by Hussein Lira MD at [address]      D/t cross bite, \"broke jaw\", moved teeth out, also had braces    OTHER SURGICAL HISTORY      Surgery at 25 months for hypospadias    TONSILLECTOMY  2005    UPPP UVULOPALATOPHARYGOPLASTY  2005    W/tonsillectomy     Family History   Adopted: Yes     Outpatient Medications Marked as Taking for the 1/4/22 encounter (Office Visit) with Hussein Lira MD   Medication Sig Dispense Refill    spironolactone (ALDACTONE) 25 MG tablet       sulfamethoxazole-trimethoprim (BACTRIM DS) 800-160 MG per tablet Take 1 tablet by mouth 2 times daily for 7 days 14 tablet 0    cetirizine (ZYRTEC) 10 MG tablet Take 10 mg by mouth daily      empagliflozin (JARDIANCE) 25 MG tablet Take 25 mg by mouth daily      alogliptin (NESINA) 25 MG TABS tablet Take 25 mg by mouth daily      metFORMIN (GLUCOPHAGE) 1000 MG tablet Take 1,000 mg by mouth 2 times daily (with meals)      nystatin (MYCOSTATIN) 267788 UNIT/GM powder Apply 2 times daily for 10 days. 1 Bottle 3    diclofenac sodium (VOLTAREN) 1 % GEL       buPROPion (WELLBUTRIN XL) 300 MG extended release tablet TAKE 1 TABLET BY MOUTH  EVERY MORNING 90 tablet 1    spironolactone (ALDACTONE) 50 MG tablet TAKE 1 TABLET BY MOUTH TWO  TIMES DAILY 180 tablet 1    omeprazole (PRILOSEC) 20 MG delayed release capsule TAKE 1 CAPSULE BY MOUTH  DAILY 90 capsule 1    simvastatin (ZOCOR) 40 MG tablet Take 1 tablet by mouth  nightly 90 tablet 1    Multiple Vitamin (ONE-A-DAY MENS PO) Take by mouth daily      albuterol sulfate  (90 Base) MCG/ACT inhaler Inhale 2 puffs into the lungs every 6 hours as needed for Shortness of Breath 1 Inhaler 5    Misc Natural Products (GLUCOSAMINE CHONDROITIN ADV) TABS Take by mouth      Elastic Bandages & Supports (V-2 HIGH COMPRESSION HOSE) MISC 30-40mmHg pressure stockings, knee high. Wear 12 hours while awake and remove at bedtime 2 each 0    Blood Glucose Monitoring Suppl ([employer] CONTOUR MONITOR) W/DEVICE KIT use twice a day  0    MICROLET LANCETS MISC use twice a day  0    fluticasone (FLONASE) 50 MCG/ACT SUSP 1 spray by Nasal route 2 times daily (Patient taking differently: 1 spray by Nasal route as needed ) 1 Bottle 0    glucose blood VI test strips (BL TEST STRIP PACK) strip 1 each by Does not apply route 2 times daily. As needed. 100 each 3    nitroGLYCERIN (NITROSTAT) 0.4 MG SL tablet Place 0.4 mg under the tongue every 5 minutes as needed.  CRANBERRY 1,680 mg by Does not apply route.  FISH OIL Take 1,000 mg by mouth daily.  Please get dosage      VITAMIN D PO Take 2,000 Int'l Units by mouth Please get dosage  aspirin 81 MG tablet Take 81 mg by mouth daily. Patient has no known allergies. Social History     Tobacco Use   Smoking Status Former Smoker    Packs/day: 0.00    Years: 1.00    Pack years: 0.00    Types: Cigarettes    Quit date: 2015    Years since quittin.4   Smokeless Tobacco Never Used     (Ifpatient a smoker, smoking cessation counseling offered)    Social History     Substance and Sexual Activity   Alcohol Use Yes    Alcohol/week: 0.0 standard drinks    Comment: 1-2/month       REVIEW OF SYSTEMS:  Review of Systems    Physical Exam:      Vitals:    22 1500   BP: 130/82   Temp: 97.3 °F (36.3 °C)     Body mass index is 59.52 kg/m². Patient is a 49 y.o. male in no acute distress and alert and oriented to person, place and time. Physical Exam  Constitutional: Patient in no acute distress. Neuro: Alert and oriented to person, place and time. Psych: Mood normal, affect normal  Lungs: Respiratory effort is normal  Cardiovascular: Warm & Pink  Abdomen: Soft, non-tender, non-distended with no CVA,  No flank tenderness,  Or hepatosplenomegaly   Lymphatics: No palpablelymphadenopathy. Bladder non-tender and not distended. Musculoskeletal: Normal gait and station    Has a meatal stenosis of his hypospadiac meatus. There is a firm area on his ventral proximal shaft, concerning for early fistula. Assessment and Plan      1. Frequent UTI    2. Penile hypospadias    3. Groin rash           Plan:          He had a urethral dilation. He likely has scarred down again. Will start bactrim again, repeat urine culture. Re-start Nystatin. Return in about 6 months (around 2022).     Prescriptions Ordered:  Orders Placed This Encounter   Medications    sulfamethoxazole-trimethoprim (BACTRIM DS) 800-160 MG per tablet     Sig: Take 1 tablet by mouth 2 times daily for 7 days     Dispense:  14 tablet     Refill:  0     Orders Placed:  No orders of the defined types were placed in this encounter. Shellie Holder MD    Agree with the ROS entered by the MA.

## 2022-01-06 LAB
CULTURE: ABNORMAL
Lab: ABNORMAL
SPECIMEN DESCRIPTION: ABNORMAL

## 2022-04-01 ENCOUNTER — HOSPITAL ENCOUNTER (OUTPATIENT)
Dept: PREADMISSION TESTING | Age: 52
Discharge: HOME | End: 2022-04-05
Payer: COMMERCIAL

## 2022-04-01 ENCOUNTER — ANESTHESIA EVENT (OUTPATIENT)
Dept: OPERATING ROOM | Age: 52
End: 2022-04-01
Payer: COMMERCIAL

## 2022-04-01 VITALS
DIASTOLIC BLOOD PRESSURE: 68 MMHG | HEIGHT: 67 IN | TEMPERATURE: 97.8 F | HEART RATE: 76 BPM | WEIGHT: 315 LBS | SYSTOLIC BLOOD PRESSURE: 150 MMHG | RESPIRATION RATE: 18 BRPM | OXYGEN SATURATION: 100 % | BODY MASS INDEX: 49.44 KG/M2

## 2022-04-01 LAB
ANION GAP SERPL CALCULATED.3IONS-SCNC: 11 MMOL/L (ref 9–17)
BUN BLDV-MCNC: 24 MG/DL (ref 6–20)
BUN/CREAT BLD: 21 (ref 9–20)
CALCIUM SERPL-MCNC: 9.5 MG/DL (ref 8.6–10.4)
CHLORIDE BLD-SCNC: 102 MMOL/L (ref 98–107)
CO2: 22 MMOL/L (ref 20–31)
CREAT SERPL-MCNC: 1.16 MG/DL (ref 0.7–1.2)
ESTIMATED AVERAGE GLUCOSE: 292 MG/DL
GFR AFRICAN AMERICAN: >60 ML/MIN
GFR NON-AFRICAN AMERICAN: >60 ML/MIN
GFR SERPL CREATININE-BSD FRML MDRD: ABNORMAL ML/MIN/{1.73_M2}
GLUCOSE BLD-MCNC: 168 MG/DL (ref 70–99)
HBA1C MFR BLD: 11.8 % (ref 4–6)
HCT VFR BLD CALC: 31.6 % (ref 40.7–50.3)
HEMOGLOBIN: 9.5 G/DL (ref 13–17)
MCH RBC QN AUTO: 25.5 PG (ref 25.2–33.5)
MCHC RBC AUTO-ENTMCNC: 30.1 G/DL (ref 28.4–34.8)
MCV RBC AUTO: 84.7 FL (ref 82.6–102.9)
NRBC AUTOMATED: 0 PER 100 WBC
PDW BLD-RTO: 14.7 % (ref 11.8–14.4)
PLATELET # BLD: 333 K/UL (ref 138–453)
PMV BLD AUTO: 8.9 FL (ref 8.1–13.5)
POTASSIUM SERPL-SCNC: 3.9 MMOL/L (ref 3.7–5.3)
RBC # BLD: 3.73 M/UL (ref 4.21–5.77)
SODIUM BLD-SCNC: 135 MMOL/L (ref 135–144)
WBC # BLD: 14.7 K/UL (ref 3.5–11.3)

## 2022-04-01 PROCEDURE — 83036 HEMOGLOBIN GLYCOSYLATED A1C: CPT

## 2022-04-01 PROCEDURE — 80048 BASIC METABOLIC PNL TOTAL CA: CPT

## 2022-04-01 PROCEDURE — 85027 COMPLETE CBC AUTOMATED: CPT

## 2022-04-01 PROCEDURE — 36415 COLL VENOUS BLD VENIPUNCTURE: CPT

## 2022-04-01 RX ORDER — SODIUM CHLORIDE 0.9 % (FLUSH) 0.9 %
10 SYRINGE (ML) INJECTION PRN
Status: CANCELLED | OUTPATIENT
Start: 2022-04-01

## 2022-04-01 RX ORDER — SODIUM CHLORIDE 0.9 % (FLUSH) 0.9 %
10 SYRINGE (ML) INJECTION EVERY 12 HOURS SCHEDULED
Status: CANCELLED | OUTPATIENT
Start: 2022-04-01

## 2022-04-01 RX ORDER — INSULIN GLARGINE 100 [IU]/ML
50 INJECTION, SOLUTION SUBCUTANEOUS NIGHTLY
COMMUNITY

## 2022-04-01 RX ORDER — LISINOPRIL 2.5 MG/1
2.5 TABLET ORAL DAILY
COMMUNITY
End: 2022-09-19

## 2022-04-01 RX ORDER — SODIUM CHLORIDE, SODIUM LACTATE, POTASSIUM CHLORIDE, CALCIUM CHLORIDE 600; 310; 30; 20 MG/100ML; MG/100ML; MG/100ML; MG/100ML
INJECTION, SOLUTION INTRAVENOUS CONTINUOUS
Status: CANCELLED | OUTPATIENT
Start: 2022-04-02

## 2022-04-01 RX ORDER — SODIUM CHLORIDE 9 MG/ML
INJECTION, SOLUTION INTRAVENOUS CONTINUOUS
Status: CANCELLED | OUTPATIENT
Start: 2022-04-02

## 2022-04-01 RX ORDER — LIDOCAINE HYDROCHLORIDE 10 MG/ML
1 INJECTION, SOLUTION EPIDURAL; INFILTRATION; INTRACAUDAL; PERINEURAL
Status: CANCELLED | OUTPATIENT
Start: 2022-04-02 | End: 2022-04-02

## 2022-04-01 RX ORDER — SODIUM CHLORIDE 9 MG/ML
25 INJECTION, SOLUTION INTRAVENOUS PRN
Status: CANCELLED | OUTPATIENT
Start: 2022-04-01

## 2022-04-01 ASSESSMENT — PAIN DESCRIPTION - FREQUENCY: FREQUENCY: CONTINUOUS

## 2022-04-01 ASSESSMENT — PAIN DESCRIPTION - LOCATION: LOCATION: KNEE

## 2022-04-01 ASSESSMENT — PAIN DESCRIPTION - ORIENTATION: ORIENTATION: LEFT

## 2022-04-01 ASSESSMENT — PAIN DESCRIPTION - DESCRIPTORS: DESCRIPTORS: ACHING;SORE;CONSTANT

## 2022-04-01 ASSESSMENT — PAIN SCALES - GENERAL: PAINLEVEL_OUTOF10: 5

## 2022-04-01 ASSESSMENT — PAIN DESCRIPTION - PAIN TYPE: TYPE: CHRONIC PAIN

## 2022-04-01 NOTE — H&P (VIEW-ONLY)
History and Physical Service   [facility]    HISTORY AND PHYSICAL EXAMINATION            Date of Evaluation: 4/1/2022  Patient name:  Celia Salas  MRN:   [id number]  YOB: 1970  PCP:    Jc Ferguson    History Obtained From:     Patient, Medical records    History of Present Illness: This is Celia Salas a 49 y.o. male who presents for a pre-admission testing appointment for an upcoming first stage Shanice urethroplasty by Dr. Bere White scheduled on 4/4/22 at 37 due to urethral stricture. S/p hypospadias surgery when the patient was 21 months old. History of frequent UTIs since the fall of 2021. The pt has urinary retention, urinary incontinence, nocturia, frequency, urgency, and mild dysuria. Pt denies hematuria. S/p urinary scope 3 weeks ago per pt. Pt took an antibiotic for 10 days after the scope. History of left kidney atrophy. WBC count is 14.7 k/uL today. Instructed pt to inform Dr. Bere White as soon as possible about the elevated WBC count. Pt states he will call Dr. Bere White today. Functional Capacity per pt:   1) Pt is able to walk 2 city blocks on level ground without SOB. Pt uses a cane when he walks long distances. 2) Pt is not able to climb 2 flights of stairs without SOB. 3) Pt is not able to walk up a hill for 1-2 city blocks without SOB. ECHO 11/06/2019  CONCLUSIONS     Summary  Left ventricle is enlarged, normal wall thickness, global left ventricular  systolic function is normal, estimated ejection fraction is 60%. Trivial valve disease as below. FINDINGS  Left Atrium  Left atrium is mildly dilated. Inter-atrial septum is intact with no evidence for an atrial septal defect. Left Ventricle  Left ventricle is enlarged, normal wall thickness, global left ventricular  systolic function is normal, estimated ejection fraction is 60%. Right Atrium  Right atrium is normal in size. Right Ventricle  Normal right ventricular size and function.   Mitral Valve  Normal mitral valve structure. Trivial mitral regurgitation. Aortic Valve  Aortic valve is trileaflet, sclerotic without restriction of motion. No evidence of aortic insufficiency. Tricuspid Valve  Tricuspid valve was not well visualized. No tricuspid regurgitation was seen. Insignificant tricuspid regurgitation, unable to estimate RVSP. Pulmonic Valve  Pulmonic valve not well visualized but Doppler velocities are normal.  Pericardial Effusion  No significant pericardial effusion is seen.     Miscellaneous  Normal aortic root dimension. E/E' = 6.05 .   IVC normal diameter & inspiratory collapse indicating normal RA filling  pressure .     M-mode / 2D Measurements & Calculations:      LVIDd:6.1 cm(3.7 - 5.6 cm)      Diastolic MDMJCV:823 ml   PVVF:7.0 cm(0.6 - 1.1 cm)       Aortic Root:3.6 cm(2.0 - 3.7 cm)   LVPWd:1 cm(0.6 - 1.1 cm)        LA Dimension: 4.7 cm(1.9 - 4.0 cm)                                   LA volume/Index: 60.67 ml /22m^2                                   LVOT:2.5 cm                                   RVDd:3.7 cm      Mitral:                                 Aortic      Valve Area (P1/2-Time): 3.33 cm^2       Peak Velocity: 1.41 m/s   Peak E-Wave: 0.73 m/s                   Mean Velocity: 0.96 m/s   Peak A-Wave: 0.74 m/s                   Peak Gradient: 7.95 mmHg   E/A Ratio: 0.99                         Mean Gradient: 4 mmHg   Peak Gradient: 2.14 mmHg   Mean Gradient: 2 mmHg   Deceleration Time: 197 msec             Area (continuity): 4.49 cm^2   P1/2t: 66 msec                          AV VTI: 26.8 cm      Area (continuity): 4.75 cm^2   Mean Velocity: 0.60 m/s                                              Pulmonic:                                              Peak Velocity: 1.24 m/s                                           Peak Gradient: 6.15 mmHg     Diastology / Tissue Doppler  Septal Wall E' velocity:0.10 m/s  Septal Wall E/E':7.6  Lateral Wall E' velocity:0.16 m/s  Lateral Wall MD   lisinopril (PRINIVIL;ZESTRIL) 2.5 MG tablet Take 2.5 mg by mouth daily   Yes Historical Provider, MD   spironolactone (ALDACTONE) 25 MG tablet  11/9/21   Historical Provider, MD   cetirizine (ZYRTEC) 10 MG tablet Take 10 mg by mouth daily    Historical Provider, MD   alogliptin (NESINA) 25 MG TABS tablet Take 25 mg by mouth daily    Historical Provider, MD   metFORMIN (GLUCOPHAGE) 1000 MG tablet Take 1,000 mg by mouth 2 times daily (with meals)    Historical Provider, MD   nystatin (MYCOSTATIN) 380655 UNIT/GM powder Apply 2 times daily for 10 days. 8/31/21   Justin Antonio MD   diclofenac sodium (VOLTAREN) 1 % GEL Apply 2 g topically 4 times daily as needed  2/3/20   Historical Provider, MD   buPROPion (WELLBUTRIN XL) 300 MG extended release tablet TAKE 1 TABLET BY MOUTH  EVERY MORNING 11/19/19   Tamara Wylie MD   spironolactone (ALDACTONE) 50 MG tablet TAKE 1 TABLET BY MOUTH TWO  TIMES DAILY 10/28/19   Tamara Wylie MD   omeprazole (PRILOSEC) 20 MG delayed release capsule TAKE 1 CAPSULE BY MOUTH  DAILY 7/17/19   Tamara Wylie MD   simvastatin (ZOCOR) 40 MG tablet Take 1 tablet by mouth  nightly 4/23/19   Tamara Wylie MD   Multiple Vitamin (ONE-A-DAY MENS PO) Take by mouth daily    Historical Provider, MD   albuterol sulfate  (90 Base) MCG/ACT inhaler Inhale 2 puffs into the lungs every 6 hours as needed for Shortness of Breath 5/24/18   Tamara Wylie MD   Misc Natural Products (GLUCOSAMINE CHONDROITIN ADV) TABS Take by mouth    Historical Provider, MD   Elastic Bandages & Supports (V-2 HIGH COMPRESSION HOSE) MISC 30-40mmHg pressure stockings, knee high.  Wear 12 hours while awake and remove at bedtime 3/22/18   Tamara Wylie MD   Blood Glucose Monitoring Suppl (JAS CONTOUR MONITOR) W/DEVICE KIT use twice a day 4/17/17   Historical Provider, MD   MICROLET LANCETS MISC use twice a day 4/17/17   Historical Provider, MD   fluticasone (FLONASE) 50 MCG/ACT SUSP 1 spray by Nasal route 2 times daily  Patient taking differently: 1 spray by Nasal route as needed  7/12/15   Baron Ramirez MD   glucose blood VI test strips (BL TEST STRIP PACK) strip 1 each by Does not apply route 2 times daily. As needed. 5/16/14   Neel Gutierres MD   nitroGLYCERIN (NITROSTAT) 0.4 MG SL tablet Place 0.4 mg under the tongue every 5 minutes as needed. Historical Provider, MD   CRANBERRY 1,680 mg by Does not apply route. Historical Provider, MD   FISH OIL Take 1,000 mg by mouth daily. Please get dosage    Historical Provider, MD   VITAMIN D PO Take 2,000 Int'l Units by mouth Please get dosage      Historical Provider, MD   aspirin 81 MG tablet Take 81 mg by mouth daily. Historical Provider, MD        Allergies:     Patient has no known allergies. Social History:     Tobacco:    reports that he quit smoking about 6 years ago. His smoking use included cigarettes. He smoked 0.00 packs per day for 1.00 year. He has never used smokeless tobacco.  Alcohol:      reports current alcohol use. Drug Use:  reports no history of drug use. Family History:     Family History   Adopted: Yes       Review of Systems:     Positive and Negative as described in HPI. CONSTITUTIONAL: Negative for fevers, chills, sweats, fatigue, and weight loss. HEENT: Pt wears glasses. Tinnitus. Clear rhinorrhea. Pt is taking Mucinex and Flonase for sinus congestion. Negative for throat pain. RESPIRATORY: Dyspnea while climbing stairs. Sleep apnea-Pt wears a BiPAP nightly. S/p UPPP and tonsillectomy in 2005. Asthma. Pt follows-up with Dr. Karyna Ji from pulmonology. Negative for cough, congestion, and wheezing. CARDIOVASCULAR: MVP, irregular heartbeat, and left ventricular enlargement. Pt formerly followed-up with Dr. Nidia Squires, but is currently looking for a new cardiologist. Pt went to the ED in 7/2021 for a \"racing heartbeat\". HR was 147 BPM. Pt denies heart issues since that time. Pt takes aspirin.  Negative for chest pain, blood clot, and palpitations. GASTROINTESTINAL: Acid reflux-treated with omeprazole. Umbilical hernia. Negative for nausea, vomiting, diarrhea, constipation, change in bowel habits, and abdominal pain. GENITOURINARY: See HPI. INTEGUMENT: Scabbed rash on the left shin. Scabbed lesion on the nose from acne. Negative for easy bruising. HEMATOLOGIC/LYMPHATIC: Left lower extremity edema. Edema in the right knuckles. ALLERGIC/IMMUNOLOGIC: Negative for urticaria and itching. ENDOCRINE: Diabetes. Last HbA1c was 13.0% over 1 month ago per pt. He was then started on insulin. Pt states his blood glucose was 500 with UTI in the past. Blood glucose was reportedly 142 this morning. Negative for increase in thirst and heat or cold intolerance. MUSCULOSKELETAL: Bilateral knee pain. NEUROLOGICAL: Tingling in bilateral hands and left hallux. Negative for headaches, dizziness, and lightheadedness. BEHAVIOR/PSYCH: Treated depression. Anxiety. Physical Exam:   BP (!) 150/68   Pulse 76   Temp 97.8 °F (36.6 °C) (Temporal)   Resp 18   Ht 5' 7\" (1.702 m)   Wt (!) 380 lb (172.4 kg)   SpO2 100%   BMI 59.52 kg/m²    No results for input(s): POCGLU in the last 72 hours. General Appearance:  Alert, well appearing, and in no acute distress. Morbidly obese. Mental status: Oriented to person, place, and time. Head: Normocephalic and atraumatic. Eye: Pt is wearing glasses. No icterus, redness, pupils equal and reactive, extraocular eye movements intact, and conjunctiva clear. Ear: Hearing grossly intact. Nose: No drainage noted. Mouth: Mucous membranes moist.  Neck: Supple and no carotid bruits noted. Lungs: Bilateral equal air entry, clear to auscultation, no wheezing, rales or rhonchi, and normal effort. Cardiovascular: Normal rate, regular rhythm, no murmur, gallop, or rub. Abdomen: Rotund. Mild tenderness around the umbilicus. Soft and active bowel sounds. Genitourinary: No CVA tenderness.    Neurologic: Normal speech and cranial nerves II through XII grossly intact. Strength 5/5 bilaterally. Skin: Lower back surgical scars. Dry skin on two of the surgical scars. Scabbed non-draining lesion on the nose. Mild rash on the left shin. No open wounds, bruising, or bleeding on exposed skin area. Extremities: Bilateral lower extremity edema (Left > right). Posterior tibial pulses are palpable bilaterally. No calf tenderness with palpation. Psych: Normal affect. Investigations:      Laboratory Testing:  Recent Results (from the past 24 hour(s))   Basic Metabolic Panel    Collection Time: 04/01/22  1:47 PM   Result Value Ref Range    Glucose 168 (H) 70 - 99 mg/dL    BUN 24 (H) 6 - 20 mg/dL    CREATININE 1.16 0.70 - 1.20 mg/dL    Bun/Cre Ratio 21 (H) 9 - 20    Calcium 9.5 8.6 - 10.4 mg/dL    Sodium 135 135 - 144 mmol/L    Potassium 3.9 3.7 - 5.3 mmol/L    Chloride 102 98 - 107 mmol/L    CO2 22 20 - 31 mmol/L    Anion Gap 11 9 - 17 mmol/L    GFR Non-African American >60 >60 mL/min    GFR African American >60 >60 mL/min    GFR Comment         CBC    Collection Time: 04/01/22  1:47 PM   Result Value Ref Range    WBC 14.7 (H) 3.5 - 11.3 k/uL    RBC 3.73 (L) 4.21 - 5.77 m/uL    Hemoglobin 9.5 (L) 13.0 - 17.0 g/dL    Hematocrit 31.6 (L) 40.7 - 50.3 %    MCV 84.7 82.6 - 102.9 fL    MCH 25.5 25.2 - 33.5 pg    MCHC 30.1 28.4 - 34.8 g/dL    RDW 14.7 (H) 11.8 - 14.4 %    Platelets 070 820 - 737 k/uL    MPV 8.9 8.1 - 13.5 fL    NRBC Automated 0.0 0.0 per 100 WBC       Recent Labs     04/01/22  1347   HGB 9.5*   HCT 31.6*   WBC 14.7*   MCV 84.7      K 3.9      CO2 22   BUN 24*   CREATININE 1.16   GLUCOSE 168*       No results for input(s): COVID19 in the last 720 hours. *Please note that labs listed above are the most recent lab values available in EPIC at the time of the visit and additional labs may have been drawn or resulted since that time. EKG: 10/01/2021. See Epic. Diagnosis:      1.  Urethral stricture    Plans:     1.  First stage Dee Dee Moulding urethroplasty      VALERIE James CNP  4/1/2022  2:39 PM

## 2022-04-01 NOTE — PRE-PROCEDURE INSTRUCTIONS
[facility] ON Monday, April 4 at 1000 AM    Once you enter the hospital lobby, take the elevators to the second floor. Check-In is at the surgery registration desk. Continue to take your home medications as you normally do up to and including the night before surgery with the exception of any blood thinning medications. Please stop any blood thinning medications as directed by your surgeon or prescribing physician. Failure to stop certain medications may interfere with your scheduled surgery. These may include:  Aspirin, Warfarin (Coumadin), Clopidogrel (Plavix), Ibuprofen (Motrin, Advil), Naproxen (Aleve), Meloxicam (Mobic), Celecoxib (Celebrex), Eliquis, Pradaxa, Xarelto, Effient, Fish Oil, Herbal supplements. Stop all blood thinning medications today  Glucosamine, fish oil          If you are diabetic, do not take any of your diabetic medications by mouth the morning of surgery. If you are taking insulin contact the doctor that manages your diabetes for instructions about any changes to your insulin dosages the day before surgery. Do not inject insulin or other injectable diabetic medications the morning of surgery unless otherwise instructed by the doctor who manages your diabetes. Please take the following medication(s) the day of surgery with a small sip of water. Wellbutrin, omeprazole    Please use your inhaler(s) if needed and bring your inhaler(s) from home the day of surgery      PREPARING FOR YOUR SURGERY:     Before surgery, you can play an important role in your own health. Because skin is not sterile, we need to be sure that your skin is as free of germs as possible before surgery by carefully washing before surgery. Preparing or prepping skin before surgery can reduce the risk of a surgical site infection.   Do not shave the area of your body where your surgery will be performed unless you received specific permission from your physician.     Preoperative Bathing Instructions   Bathe or shower the day of surgery.  Wear clean clothing after bathing.  Shampoo hair before surgery   Keep nails clean    Do not apply alcohol-based hair or skin products,    Do not apply lotion, emollients, cosmetics, perfumes or deodorant the day of surgery.  Do not shave the area of your body where your surgery will be performed unless you receive specific permission from your surgeon. Patient Instructions:    [facility] If you are having any type of anesthesia you are to have nothing to eat or drink after midnight the night before your surgery. This includes gum, hard candy, mints, water or smoking or chewing tobacco.  The only exception to this is a small sip of water to take with any morning dose of heart, blood pressure, or seizure medications. No alcoholic beverages for 24 hours prior to surgery.  Brush your teeth but do not swallow water.  Bring your eye glasses and case with you. No contacts are to be worn the day of surgery. You also may bring your hearing aids. Most surgical procedures involving anesthesia will require that you remove your dentures prior to surgery.  Do not wear any jewelry or body piercings day of surgery. Also, NO lotion, perfume or deodorant to be used the day of surgery. No nail polish on the operative extremity (arm/leg surgeries)     If you are staying overnight with us, please bring a small bag of necessary personal items.  Please wear loose, comfortable clothing. If you are potentially going to have a cast or brace bring clothing that will fit over them.                                                                                                                            In case of illness - If you have cold or flu like symptoms (high fever, runny nose, sore throat, cough, etc.) rash, nausea, vomiting, loose stools, and/or recent contact with someone who has a contagious disease (chicken pox, measles, etc.) Please call your doctor before coming to the hospital.     Day of Surgery/Procedure:    As a patient at [address] you can expect quality medical and nursing care that is centered on your individual needs. Our goal is to make your surgical experience as comfortable as possible    . Transportation After Your Surgery/Procedure: You will need a friend or family member to drive you home after your procedure. Your  must be 25years of age or older and able to sign off on your discharge instructions. A taxi cab or any other form of public transportation is not acceptable. Your friend or family member must stay at the hospital throughout your procedure. Someone must remain with you for the first 24 hours after your surgery if you receive anesthesia or medication. If you do not have someone to stay with you, your procedure may be cancelled.       If you have any other questions regarding your procedure or the day of surgery, please call [phone]      _________________________  ____________________________  Signature (Patient)              Signature (Provider)               Date

## 2022-04-01 NOTE — H&P
History and Physical Service   [facility] 12    HISTORY AND PHYSICAL EXAMINATION            Date of Evaluation: 4/1/2022  Patient name:  Loli Willis  MRN:   [id number]  YOB: 1970  PCP:    Zuri Rodriguez    History Obtained From:     Patient, Medical records    History of Present Illness: This is Loli Willis a 49 y.o. male who presents for a pre-admission testing appointment for an upcoming first stage Shanice urethroplasty by Dr. Trev Cornejo scheduled on 4/4/22 at [address] [place] [address] due to urethral stricture. S/p hypospadias surgery when the patient was 21 months old. History of frequent UTIs since the fall of 2021. The pt has urinary retention, urinary incontinence, nocturia, frequency, urgency, and mild dysuria. Pt denies hematuria. S/p urinary scope 3 weeks ago per pt. Pt took an antibiotic for 10 days after the scope. History of left kidney atrophy. WBC count is 14.7 k/uL today. Instructed pt to inform Dr. Trev Cornejo as soon as possible about the elevated WBC count. Pt states he will call Dr. Trev Cornejo today. Functional Capacity per pt:   1) Pt is able to walk 2 city blocks on level ground without SOB. Pt uses a cane when he walks long distances. 2) Pt is not able to climb 2 flights of stairs without SOB. 3) Pt is not able to walk up a hill for 1-2 city blocks without SOB. ECHO 11/06/2019  CONCLUSIONS     Summary  Left ventricle is enlarged, normal wall thickness, global left ventricular  systolic function is normal, estimated ejection fraction is 60%. Trivial valve disease as below. FINDINGS  Left Atrium  Left atrium is mildly dilated. Inter-atrial septum is intact with no evidence for an atrial septal defect. Left Ventricle  Left ventricle is enlarged, normal wall thickness, global left ventricular  systolic function is normal, estimated ejection fraction is 60%. Right Atrium  Right atrium is normal in size. Right Ventricle  Normal right ventricular size and function.   Mitral Valve  Normal mitral valve structure. Trivial mitral regurgitation. Aortic Valve  Aortic valve is trileaflet, sclerotic without restriction of motion. No evidence of aortic insufficiency. Tricuspid Valve  Tricuspid valve was not well visualized. No tricuspid regurgitation was seen. Insignificant tricuspid regurgitation, unable to estimate RVSP. Pulmonic Valve  Pulmonic valve not well visualized but Doppler velocities are normal.  Pericardial Effusion  No significant pericardial effusion is seen.     Miscellaneous  Normal aortic root dimension. E/E' = 6.05 .   IVC normal diameter & inspiratory collapse indicating normal RA filling  pressure .     M-mode / 2D Measurements & Calculations:      LVIDd:6.1 cm(3.7 - 5.6 cm)      Diastolic VDKKWV:421 ml   KJEW:4.9 cm(0.6 - 1.1 cm)       Aortic Root:3.6 cm(2.0 - 3.7 cm)   LVPWd:1 cm(0.6 - 1.1 cm)        LA Dimension: 4.7 cm(1.9 - 4.0 cm)                                   LA volume/Index: 60.67 ml /22m^2                                   LVOT:2.5 cm                                   RVDd:3.7 cm      Mitral:                                 Aortic      Valve Area (P1/2-Time): 3.33 cm^2       Peak Velocity: 1.41 m/s   Peak E-Wave: 0.73 m/s                   Mean Velocity: 0.96 m/s   Peak A-Wave: 0.74 m/s                   Peak Gradient: 7.95 mmHg   E/A Ratio: 0.99                         Mean Gradient: 4 mmHg   Peak Gradient: 2.14 mmHg   Mean Gradient: 2 mmHg   Deceleration Time: 197 msec             Area (continuity): 4.49 cm^2   P1/2t: 66 msec                          AV VTI: 26.8 cm      Area (continuity): 4.75 cm^2   Mean Velocity: 0.60 m/s                                              Pulmonic:                                              Peak Velocity: 1.24 m/s                                           Peak Gradient: 6.15 mmHg     Diastology / Tissue Doppler  Septal Wall E' velocity:0.10 m/s  Septal Wall E/E':7.6  Lateral Wall E' velocity:0.16 m/s  Lateral Wall E/E':4.5    Past Medical History:     Past Medical History:   Diagnosis Date    Acute pyelonephritis 10/30/2018    Anemia     Anxiety     Arthritis     osteoarthitis     Asthma     Atrophic kidney     left side    Cellulitis of left lower extremity 77/89/3343    Complicated UTI (urinary tract infection) 10/01/2021    Depression     Essential hypertension 11/21/2016    Frequent UTI     GERD (gastroesophageal reflux disease)     Hyperlipidemia     Left ventricular enlargement     See ECHO 11/06/2019    Liver disease     Migraines     MRSA infection     Left knee, states \"rare strain\"    MVP (mitral valve prolapse)     Neuropathy     hands and left foot     Nonalcoholic hepatosteatosis 6/1/9428    Recurrent nephrolithiasis 10/31/2018    Screening PSA (prostate specific antigen) 07/13/2009    Skipped beats     Patient states skipped beats or extra beats noted in the past    Sleep apnea     Type 2 diabetes mellitus ([facility] [address]     Umbilical hernia     Unspecified sleep apnea     BiPAP    Urethral stricture         Past Surgical History:     Past Surgical History:   Procedure Laterality Date    ANTERIOR CRUCIATE LIGAMENT REPAIR Left 02/2009    CYSTOSCOPY      CYSTOSCOPY N/A 11/12/2021    CYSTOSCOPY URETHRAL DILATATION performed by Su Tiwari MD at [address]      D/t cross bite, \"broke jaw\", moved teeth out, also had braces    OTHER SURGICAL HISTORY      Surgery at 18 months for hypospadias    TONSILLECTOMY  2005    UPPP UVULOPALATOPHARYGOPLASTY  2005    W/tonsillectomy    VARICOSE VEIN SURGERY          Medications Prior to Admission:     Prior to Admission medications    Medication Sig Start Date End Date Taking?  Authorizing Provider   insulin glargine (LANTUS) 100 UNIT/ML injection vial Inject 35 Units into the skin nightly   Yes Historical Provider, MD   Semaglutide (OZEMPIC, 1 MG/DOSE, SC) Inject 1 mg into the skin once a week On Sundays   Yes Historical Provider, MD   lisinopril (PRINIVIL;ZESTRIL) 2.5 MG tablet Take 2.5 mg by mouth daily   Yes Historical Provider, MD   spironolactone (ALDACTONE) 25 MG tablet  11/9/21   Historical Provider, MD   cetirizine (ZYRTEC) 10 MG tablet Take 10 mg by mouth daily    Historical Provider, MD   alogliptin (NESINA) 25 MG TABS tablet Take 25 mg by mouth daily    Historical Provider, MD   metFORMIN (GLUCOPHAGE) 1000 MG tablet Take 1,000 mg by mouth 2 times daily (with meals)    Historical Provider, MD   nystatin (MYCOSTATIN) 373184 UNIT/GM powder Apply 2 times daily for 10 days. 8/31/21   Justin Antonio MD   diclofenac sodium (VOLTAREN) 1 % GEL Apply 2 g topically 4 times daily as needed  2/3/20   Historical Provider, MD   buPROPion (WELLBUTRIN XL) 300 MG extended release tablet TAKE 1 TABLET BY MOUTH  EVERY MORNING 11/19/19   Tamara Baird MD   spironolactone (ALDACTONE) 50 MG tablet TAKE 1 TABLET BY MOUTH TWO  TIMES DAILY 10/28/19   Tamara Baird MD   omeprazole (PRILOSEC) 20 MG delayed release capsule TAKE 1 CAPSULE BY MOUTH  DAILY 7/17/19   Tamara Baird MD   simvastatin (ZOCOR) 40 MG tablet Take 1 tablet by mouth  nightly 4/23/19   Tamara Baird MD   Multiple Vitamin (ONE-A-DAY MENS PO) Take by mouth daily    Historical Provider, MD   albuterol sulfate  (90 Base) MCG/ACT inhaler Inhale 2 puffs into the lungs every 6 hours as needed for Shortness of Breath 5/24/18   Tamara Baird MD   Misc Natural Products (GLUCOSAMINE CHONDROITIN ADV) TABS Take by mouth    Historical Provider, MD   Elastic Bandages & Supports (V-2 HIGH COMPRESSION HOSE) MISC 30-40mmHg pressure stockings, knee high.  Wear 12 hours while awake and remove at bedtime 3/22/18   Tamara Baird MD   Blood Glucose Monitoring Suppl ([employer] CONTOUR MONITOR) W/DEVICE KIT use twice a day 4/17/17   Historical Provider, MD   MICROLET LANCETS MISC use twice a day 4/17/17   Historical Provider, MD   fluticasone (FLONASE) 50 MCG/ACT SUSP 1 spray by Nasal route 2 times daily  Patient taking differently: 1 spray by Nasal route as needed  7/12/15   Stevo Majano MD   glucose blood VI test strips (BL TEST STRIP PACK) strip 1 each by Does not apply route 2 times daily. As needed. 5/16/14   Neel Gutierres MD   nitroGLYCERIN (NITROSTAT) 0.4 MG SL tablet Place 0.4 mg under the tongue every 5 minutes as needed. Historical Provider, MD   CRANBERRY 1,680 mg by Does not apply route. Historical Provider, MD   FISH OIL Take 1,000 mg by mouth daily. Please get dosage    Historical Provider, MD   VITAMIN D PO Take 2,000 Int'l Units by mouth Please get dosage      Historical Provider, MD   aspirin 81 MG tablet Take 81 mg by mouth daily. Historical Provider, MD        Allergies:     Patient has no known allergies. Social History:     Tobacco:    reports that he quit smoking about 6 years ago. His smoking use included cigarettes. He smoked 0.00 packs per day for 1.00 year. He has never used smokeless tobacco.  Alcohol:      reports current alcohol use. Drug Use:  reports no history of drug use. Family History:     Family History   Adopted: Yes       Review of Systems:     Positive and Negative as described in HPI. CONSTITUTIONAL: Negative for fevers, chills, sweats, fatigue, and weight loss. HEENT: Pt wears glasses. Tinnitus. Clear rhinorrhea. Pt is taking Mucinex and Flonase for sinus congestion. Negative for throat pain. RESPIRATORY: Dyspnea while climbing stairs. Sleep apnea-Pt wears a BiPAP nightly. S/p UPPP and tonsillectomy in 2005. Asthma. Pt follows-up with Dr. Mike Okeefe from pulmonology. Negative for cough, congestion, and wheezing. CARDIOVASCULAR: MVP, irregular heartbeat, and left ventricular enlargement. Pt formerly followed-up with Dr. Jyoti Trent, but is currently looking for a new cardiologist. Pt went to the ED in 7/2021 for a \"racing heartbeat\". HR was 147 BPM. Pt denies heart issues since that time. Pt takes aspirin.  Negative for chest pain, blood clot, and palpitations. GASTROINTESTINAL: Acid reflux-treated with omeprazole. Umbilical hernia. Negative for nausea, vomiting, diarrhea, constipation, change in bowel habits, and abdominal pain. GENITOURINARY: See HPI. INTEGUMENT: Scabbed rash on the left shin. Scabbed lesion on the nose from acne. Negative for easy bruising. HEMATOLOGIC/LYMPHATIC: Left lower extremity edema. Edema in the right knuckles. ALLERGIC/IMMUNOLOGIC: Negative for urticaria and itching. ENDOCRINE: Diabetes. Last HbA1c was 13.0% over 1 month ago per pt. He was then started on insulin. Pt states his blood glucose was 500 with UTI in the past. Blood glucose was reportedly 142 this morning. Negative for increase in thirst and heat or cold intolerance. MUSCULOSKELETAL: Bilateral knee pain. NEUROLOGICAL: Tingling in bilateral hands and left hallux. Negative for headaches, dizziness, and lightheadedness. BEHAVIOR/PSYCH: Treated depression. Anxiety. Physical Exam:   BP (!) 150/68   Pulse 76   Temp 97.8 °F (36.6 °C) (Temporal)   Resp 18   Ht 5' 7\" (1.702 m)   Wt (!) 380 lb (172.4 kg)   SpO2 100%   BMI 59.52 kg/m²    No results for input(s): POCGLU in the last 72 hours. General Appearance:  Alert, well appearing, and in no acute distress. Morbidly obese. Mental status: Oriented to person, place, and time. Head: Normocephalic and atraumatic. Eye: Pt is wearing glasses. No icterus, redness, pupils equal and reactive, extraocular eye movements intact, and conjunctiva clear. Ear: Hearing grossly intact. Nose: No drainage noted. Mouth: Mucous membranes moist.  Neck: Supple and no carotid bruits noted. Lungs: Bilateral equal air entry, clear to auscultation, no wheezing, rales or rhonchi, and normal effort. Cardiovascular: Normal rate, regular rhythm, no murmur, gallop, or rub. Abdomen: Rotund. Mild tenderness around the umbilicus. Soft and active bowel sounds. Genitourinary: No CVA tenderness.    Neurologic: stricture    Plans:     1.  First stage Dann Headings urethroplasty      VALERIE Marquez - CNP  4/1/2022  2:39 PM

## 2022-04-04 ENCOUNTER — ANESTHESIA (OUTPATIENT)
Dept: OPERATING ROOM | Age: 52
End: 2022-04-04
Payer: COMMERCIAL

## 2022-04-04 ENCOUNTER — HOSPITAL ENCOUNTER (OUTPATIENT)
Age: 52
Setting detail: OUTPATIENT SURGERY
Discharge: HOME | End: 2022-04-04
Attending: UROLOGY | Admitting: UROLOGY
Payer: COMMERCIAL

## 2022-04-04 VITALS
HEART RATE: 67 BPM | WEIGHT: 315 LBS | OXYGEN SATURATION: 97 % | SYSTOLIC BLOOD PRESSURE: 110 MMHG | DIASTOLIC BLOOD PRESSURE: 63 MMHG | RESPIRATION RATE: 19 BRPM | HEIGHT: 67 IN | BODY MASS INDEX: 49.44 KG/M2 | TEMPERATURE: 97.2 F

## 2022-04-04 VITALS — TEMPERATURE: 97 F | OXYGEN SATURATION: 97 % | DIASTOLIC BLOOD PRESSURE: 67 MMHG | SYSTOLIC BLOOD PRESSURE: 143 MMHG

## 2022-04-04 DIAGNOSIS — G89.18 POST-OP PAIN: Primary | ICD-10-CM

## 2022-04-04 LAB
GLUCOSE BLD-MCNC: 118 MG/DL (ref 75–110)
GLUCOSE BLD-MCNC: 143 MG/DL (ref 75–110)

## 2022-04-04 PROCEDURE — 3700000001 HC ADD 15 MINUTES (ANESTHESIA): Performed by: UROLOGY

## 2022-04-04 PROCEDURE — 82947 ASSAY GLUCOSE BLOOD QUANT: CPT

## 2022-04-04 PROCEDURE — 7100000010 HC PHASE II RECOVERY - FIRST 15 MIN: Performed by: UROLOGY

## 2022-04-04 PROCEDURE — 6360000002 HC RX W HCPCS: Performed by: NURSE ANESTHETIST, CERTIFIED REGISTERED

## 2022-04-04 PROCEDURE — 7100000001 HC PACU RECOVERY - ADDTL 15 MIN: Performed by: UROLOGY

## 2022-04-04 PROCEDURE — 2500000003 HC RX 250 WO HCPCS: Performed by: NURSE ANESTHETIST, CERTIFIED REGISTERED

## 2022-04-04 PROCEDURE — 2709999900 HC NON-CHARGEABLE SUPPLY: Performed by: UROLOGY

## 2022-04-04 PROCEDURE — 3600000002 HC SURGERY LEVEL 2 BASE: Performed by: UROLOGY

## 2022-04-04 PROCEDURE — 7100000000 HC PACU RECOVERY - FIRST 15 MIN: Performed by: UROLOGY

## 2022-04-04 PROCEDURE — 2580000003 HC RX 258: Performed by: ANESTHESIOLOGY

## 2022-04-04 PROCEDURE — 7100000011 HC PHASE II RECOVERY - ADDTL 15 MIN: Performed by: UROLOGY

## 2022-04-04 PROCEDURE — 6360000002 HC RX W HCPCS: Performed by: ANESTHESIOLOGY

## 2022-04-04 PROCEDURE — 6370000000 HC RX 637 (ALT 250 FOR IP): Performed by: UROLOGY

## 2022-04-04 PROCEDURE — 3700000000 HC ANESTHESIA ATTENDED CARE: Performed by: UROLOGY

## 2022-04-04 PROCEDURE — 3600000012 HC SURGERY LEVEL 2 ADDTL 15MIN: Performed by: UROLOGY

## 2022-04-04 RX ORDER — FENTANYL CITRATE 50 UG/ML
25 INJECTION, SOLUTION INTRAMUSCULAR; INTRAVENOUS EVERY 5 MIN PRN
Status: DISCONTINUED | OUTPATIENT
Start: 2022-04-04 | End: 2022-04-04 | Stop reason: HOSPADM

## 2022-04-04 RX ORDER — CEFAZOLIN SODIUM 1 G/3ML
INJECTION, POWDER, FOR SOLUTION INTRAMUSCULAR; INTRAVENOUS PRN
Status: DISCONTINUED | OUTPATIENT
Start: 2022-04-04 | End: 2022-04-04 | Stop reason: SDUPTHER

## 2022-04-04 RX ORDER — PROPOFOL 10 MG/ML
INJECTION, EMULSION INTRAVENOUS PRN
Status: DISCONTINUED | OUTPATIENT
Start: 2022-04-04 | End: 2022-04-04 | Stop reason: SDUPTHER

## 2022-04-04 RX ORDER — FENTANYL CITRATE 50 UG/ML
50 INJECTION, SOLUTION INTRAMUSCULAR; INTRAVENOUS EVERY 5 MIN PRN
Status: DISCONTINUED | OUTPATIENT
Start: 2022-04-04 | End: 2022-04-04 | Stop reason: HOSPADM

## 2022-04-04 RX ORDER — OXYCODONE HYDROCHLORIDE 5 MG/1
10 TABLET ORAL PRN
Status: DISCONTINUED | OUTPATIENT
Start: 2022-04-04 | End: 2022-04-04 | Stop reason: HOSPADM

## 2022-04-04 RX ORDER — LIDOCAINE HYDROCHLORIDE 20 MG/ML
INJECTION, SOLUTION INFILTRATION; PERINEURAL PRN
Status: DISCONTINUED | OUTPATIENT
Start: 2022-04-04 | End: 2022-04-04 | Stop reason: SDUPTHER

## 2022-04-04 RX ORDER — ROCURONIUM BROMIDE 10 MG/ML
INJECTION, SOLUTION INTRAVENOUS PRN
Status: DISCONTINUED | OUTPATIENT
Start: 2022-04-04 | End: 2022-04-04 | Stop reason: SDUPTHER

## 2022-04-04 RX ORDER — SODIUM CHLORIDE 0.9 % (FLUSH) 0.9 %
5-40 SYRINGE (ML) INJECTION PRN
Status: DISCONTINUED | OUTPATIENT
Start: 2022-04-04 | End: 2022-04-04 | Stop reason: HOSPADM

## 2022-04-04 RX ORDER — GINSENG 100 MG
CAPSULE ORAL PRN
Status: DISCONTINUED | OUTPATIENT
Start: 2022-04-04 | End: 2022-04-04 | Stop reason: ALTCHOICE

## 2022-04-04 RX ORDER — BENZONATATE 100 MG/1
100 CAPSULE ORAL 3 TIMES DAILY PRN
COMMUNITY
Start: 2022-03-30

## 2022-04-04 RX ORDER — OXYCODONE HYDROCHLORIDE 5 MG/1
5 TABLET ORAL PRN
Status: DISCONTINUED | OUTPATIENT
Start: 2022-04-04 | End: 2022-04-04 | Stop reason: HOSPADM

## 2022-04-04 RX ORDER — DEXAMETHASONE SODIUM PHOSPHATE 10 MG/ML
INJECTION INTRAMUSCULAR; INTRAVENOUS PRN
Status: DISCONTINUED | OUTPATIENT
Start: 2022-04-04 | End: 2022-04-04 | Stop reason: SDUPTHER

## 2022-04-04 RX ORDER — SODIUM CHLORIDE 9 MG/ML
25 INJECTION, SOLUTION INTRAVENOUS PRN
Status: DISCONTINUED | OUTPATIENT
Start: 2022-04-04 | End: 2022-04-04 | Stop reason: HOSPADM

## 2022-04-04 RX ORDER — SODIUM CHLORIDE 0.9 % (FLUSH) 0.9 %
5-40 SYRINGE (ML) INJECTION EVERY 12 HOURS SCHEDULED
Status: DISCONTINUED | OUTPATIENT
Start: 2022-04-04 | End: 2022-04-04 | Stop reason: HOSPADM

## 2022-04-04 RX ORDER — FENTANYL CITRATE 50 UG/ML
INJECTION, SOLUTION INTRAMUSCULAR; INTRAVENOUS PRN
Status: DISCONTINUED | OUTPATIENT
Start: 2022-04-04 | End: 2022-04-04 | Stop reason: SDUPTHER

## 2022-04-04 RX ORDER — ONDANSETRON 2 MG/ML
4 INJECTION INTRAMUSCULAR; INTRAVENOUS
Status: DISCONTINUED | OUTPATIENT
Start: 2022-04-04 | End: 2022-04-04 | Stop reason: HOSPADM

## 2022-04-04 RX ORDER — DOXYCYCLINE HYCLATE 100 MG
100 TABLET ORAL 2 TIMES DAILY
Qty: 10 TABLET | Refills: 0 | Status: SHIPPED | OUTPATIENT
Start: 2022-04-04 | End: 2022-04-09

## 2022-04-04 RX ORDER — SODIUM CHLORIDE, SODIUM LACTATE, POTASSIUM CHLORIDE, CALCIUM CHLORIDE 600; 310; 30; 20 MG/100ML; MG/100ML; MG/100ML; MG/100ML
INJECTION, SOLUTION INTRAVENOUS CONTINUOUS
Status: DISCONTINUED | OUTPATIENT
Start: 2022-04-04 | End: 2022-04-04 | Stop reason: HOSPADM

## 2022-04-04 RX ORDER — HYDROCODONE BITARTRATE AND ACETAMINOPHEN 5; 325 MG/1; MG/1
1 TABLET ORAL EVERY 6 HOURS PRN
Qty: 20 TABLET | Refills: 0 | Status: SHIPPED | OUTPATIENT
Start: 2022-04-04 | End: 2022-04-09

## 2022-04-04 RX ORDER — ONDANSETRON 2 MG/ML
INJECTION INTRAMUSCULAR; INTRAVENOUS PRN
Status: DISCONTINUED | OUTPATIENT
Start: 2022-04-04 | End: 2022-04-04 | Stop reason: SDUPTHER

## 2022-04-04 RX ORDER — ULTRASOUND COUPLING MEDIUM
GEL (GRAM) TOPICAL PRN
Status: DISCONTINUED | OUTPATIENT
Start: 2022-04-04 | End: 2022-04-04 | Stop reason: ALTCHOICE

## 2022-04-04 RX ADMIN — DEXAMETHASONE SODIUM PHOSPHATE 10 MG: 10 INJECTION INTRAMUSCULAR; INTRAVENOUS at 13:09

## 2022-04-04 RX ADMIN — ROCURONIUM BROMIDE 50 MG: 10 INJECTION, SOLUTION INTRAVENOUS at 12:47

## 2022-04-04 RX ADMIN — LIDOCAINE HYDROCHLORIDE 80 MG: 20 INJECTION, SOLUTION INFILTRATION; PERINEURAL at 12:47

## 2022-04-04 RX ADMIN — CEFAZOLIN SODIUM 3000 MG: 1 INJECTION, POWDER, FOR SOLUTION INTRAMUSCULAR; INTRAVENOUS at 13:01

## 2022-04-04 RX ADMIN — ONDANSETRON 4 MG: 2 INJECTION INTRAMUSCULAR; INTRAVENOUS at 13:09

## 2022-04-04 RX ADMIN — SODIUM CHLORIDE, POTASSIUM CHLORIDE, SODIUM LACTATE AND CALCIUM CHLORIDE: 600; 310; 30; 20 INJECTION, SOLUTION INTRAVENOUS at 10:37

## 2022-04-04 RX ADMIN — Medication 50 MCG: at 12:47

## 2022-04-04 RX ADMIN — Medication 50 MCG: at 13:24

## 2022-04-04 RX ADMIN — PROPOFOL 200 MG: 10 INJECTION, EMULSION INTRAVENOUS at 12:47

## 2022-04-04 RX ADMIN — FENTANYL CITRATE 50 MCG: 50 INJECTION, SOLUTION INTRAMUSCULAR; INTRAVENOUS at 14:08

## 2022-04-04 ASSESSMENT — PULMONARY FUNCTION TESTS
PIF_VALUE: 23
PIF_VALUE: 24
PIF_VALUE: 25
PIF_VALUE: 25
PIF_VALUE: 1
PIF_VALUE: 25
PIF_VALUE: 23
PIF_VALUE: 25
PIF_VALUE: 1
PIF_VALUE: 26
PIF_VALUE: 25
PIF_VALUE: 27
PIF_VALUE: 1
PIF_VALUE: 25
PIF_VALUE: 23
PIF_VALUE: 0
PIF_VALUE: 1
PIF_VALUE: 23
PIF_VALUE: 22
PIF_VALUE: 25
PIF_VALUE: 0
PIF_VALUE: 25
PIF_VALUE: 0
PIF_VALUE: 23
PIF_VALUE: 25
PIF_VALUE: 25
PIF_VALUE: 24
PIF_VALUE: 25
PIF_VALUE: 1
PIF_VALUE: 25
PIF_VALUE: 25
PIF_VALUE: 2
PIF_VALUE: 24
PIF_VALUE: 23
PIF_VALUE: 25
PIF_VALUE: 31
PIF_VALUE: 24
PIF_VALUE: 25
PIF_VALUE: 25
PIF_VALUE: 23
PIF_VALUE: 25
PIF_VALUE: 25
PIF_VALUE: 1
PIF_VALUE: 1
PIF_VALUE: 25
PIF_VALUE: 1
PIF_VALUE: 23
PIF_VALUE: 23
PIF_VALUE: 25
PIF_VALUE: 24
PIF_VALUE: 25
PIF_VALUE: 23
PIF_VALUE: 23
PIF_VALUE: 25

## 2022-04-04 ASSESSMENT — PAIN - FUNCTIONAL ASSESSMENT: PAIN_FUNCTIONAL_ASSESSMENT: 0-10

## 2022-04-04 ASSESSMENT — PAIN DESCRIPTION - PROGRESSION: CLINICAL_PROGRESSION: GRADUALLY WORSENING

## 2022-04-04 ASSESSMENT — PAIN DESCRIPTION - FREQUENCY: FREQUENCY: CONTINUOUS

## 2022-04-04 ASSESSMENT — PAIN SCALES - GENERAL
PAINLEVEL_OUTOF10: 3
PAINLEVEL_OUTOF10: 8
PAINLEVEL_OUTOF10: 3

## 2022-04-04 ASSESSMENT — PAIN DESCRIPTION - LOCATION: LOCATION: GROIN

## 2022-04-04 ASSESSMENT — PAIN DESCRIPTION - DESCRIPTORS: DESCRIPTORS: ACHING

## 2022-04-04 ASSESSMENT — PAIN DESCRIPTION - PAIN TYPE: TYPE: SURGICAL PAIN

## 2022-04-04 NOTE — ANESTHESIA POSTPROCEDURE EVALUATION
Department of Anesthesiology  Postprocedure Note    Patient: Cachorro Norwood  MRN: [id number]  YOB: 1970  Date of evaluation: 4/4/2022  Time:  1:58 PM     Procedure Summary     Date: 04/04/22 Room / Location: [address] [place] [address]    Anesthesia Start: 4169 Anesthesia Stop: 0266    Procedure: 1ST STAGE Maci Gills (N/A ) Diagnosis: (URETHRAL STRICTURE)    Surgeons: Dorothea Livingston MD Responsible Provider: Adelaida Linares MD    Anesthesia Type: general ASA Status: 3          Anesthesia Type: general    David Phase I:      David Phase II:      Last vitals: Reviewed and per EMR flowsheets.        Anesthesia Post Evaluation    Patient location during evaluation: PACU  Patient participation: complete - patient participated  Level of consciousness: awake  Airway patency: patent  Nausea & Vomiting: no vomiting and no nausea  Complications: no  Cardiovascular status: hemodynamically stable  Respiratory status: acceptable  Hydration status: stable

## 2022-04-04 NOTE — INTERVAL H&P NOTE
Interval H&P Note    Pt Name: Loli Willis  MRN: [id number]  YOB: 1970  Date of evaluation: 4/4/2022      [x] I have reviewed in epic the H&P by DEVIN Childress CNP done in [facility] for an Interval History and Physical note. [x] I have examined  Anila Charles  There are no changes to the patient who is scheduled for a 1st stage Johanson urethroplasty by Dr Devon Parker for urethral stricture. The patient denies new health changes, fever, chills, wheezing, cough, increased SOB, chest pain, open sores or wounds. Last ASA 81mg 3/27/22  +DM POC      Vital signs: /65   Pulse 73   Temp 97.1 °F (36.2 °C) (Temporal)   Resp 20   Ht 5' 7\" (1.702 m)   Wt (!) 380 lb (172.4 kg)   SpO2 99%   BMI 59.52 kg/m²     Allergies:  Patient has no known allergies. Medications:    Prior to Admission medications    Medication Sig Start Date End Date Taking? Authorizing Provider   benzonatate (TESSALON) 100 MG capsule Take 100 mg by mouth 3 times daily as needed 3/30/22  Yes Historical Provider, MD   insulin glargine (LANTUS) 100 UNIT/ML injection vial Inject 35 Units into the skin nightly    Historical Provider, MD   Semaglutide (OZEMPIC, 1 MG/DOSE, SC) Inject 1 mg into the skin once a week On Sundays    Historical Provider, MD   lisinopril (PRINIVIL;ZESTRIL) 2.5 MG tablet Take 2.5 mg by mouth daily    Historical Provider, MD   spironolactone (ALDACTONE) 25 MG tablet  11/9/21   Historical Provider, MD   cetirizine (ZYRTEC) 10 MG tablet Take 10 mg by mouth daily    Historical Provider, MD   alogliptin (NESINA) 25 MG TABS tablet Take 25 mg by mouth daily    Historical Provider, MD   metFORMIN (GLUCOPHAGE) 1000 MG tablet Take 1,000 mg by mouth 2 times daily (with meals)    Historical Provider, MD   nystatin (MYCOSTATIN) 052373 UNIT/GM powder Apply 2 times daily for 10 days.  8/31/21   Justin Antonio MD   diclofenac sodium (VOLTAREN) 1 % GEL Apply 2 g topically 4 times daily as needed  2/3/20   Historical Provider, MD buPROPion (WELLBUTRIN XL) 300 MG extended release tablet TAKE 1 TABLET BY MOUTH  EVERY MORNING 11/19/19   Tamara Wylie MD   spironolactone (ALDACTONE) 50 MG tablet TAKE 1 TABLET BY MOUTH TWO  TIMES DAILY 10/28/19   Tamara Wylie MD   omeprazole (PRILOSEC) 20 MG delayed release capsule TAKE 1 CAPSULE BY MOUTH  DAILY 7/17/19   Tamara Wylie MD   simvastatin (ZOCOR) 40 MG tablet Take 1 tablet by mouth  nightly 4/23/19   Tamara Wylie MD   Multiple Vitamin (ONE-A-DAY MENS PO) Take by mouth daily    Historical Provider, MD   albuterol sulfate  (90 Base) MCG/ACT inhaler Inhale 2 puffs into the lungs every 6 hours as needed for Shortness of Breath 5/24/18   Tamara Wylie MD   Misc Natural Products (GLUCOSAMINE CHONDROITIN ADV) TABS Take by mouth    Historical Provider, MD   Elastic Bandages & Supports (V-2 HIGH COMPRESSION HOSE) MISC 30-40mmHg pressure stockings, knee high. Wear 12 hours while awake and remove at bedtime 3/22/18   Tamara Wylie MD   Blood Glucose Monitoring Suppl ("[employer] CONTOUR MONITOR) W/DEVICE KIT use twice a day 4/17/17   Historical MD Rodolfo   MICROLET LANCETS MISC use twice a day 4/17/17   Historical Provider, MD   fluticasone (FLONASE) 50 MCG/ACT SUSP 1 spray by Nasal route 2 times daily  Patient taking differently: 1 spray by Nasal route as needed  7/12/15   Jarvis Chris MD   glucose blood VI test strips (BL TEST STRIP PACK) strip 1 each by Does not apply route 2 times daily. As needed. 5/16/14   Neel Gutierres MD   nitroGLYCERIN (NITROSTAT) 0.4 MG SL tablet Place 0.4 mg under the tongue every 5 minutes as needed. Historical Provider, MD   CRANBERRY 1,680 mg by Does not apply route. Historical Provider, MD   FISH OIL Take 1,000 mg by mouth daily. Please get dosage    Historical Provider, MD   VITAMIN D PO Take 2,000 Int'l Units by mouth Please get dosage      Historical Provider, MD   aspirin 81 MG tablet Take 81 mg by mouth daily.       Historical Provider, MD         This is a 49 y.o. morbidly obese male who is pleasant, cooperative, alert and oriented x3, in no acute distress  Glasses    Physical Exam:  Lungs: Bilateral equal air entry, unlabored, clear to ausculation, no wheezing, rales or rhonchi, normal effort  Cardiovascular: HR 73 normal rate, regular rhythm, no murmur, gallop, rub. Abdomen: obese, rotund, soft, nontender, nondistended, normal bowel sounds. Extremities:  bilateral lower extremity peripheral edema     Labs:  Recent Labs     04/01/22  1347   HGB 9.5*   HCT 31.6*   WBC 14.7*   MCV 84.7         K 3.9      CO2 22   BUN 24*   CREATININE 1.16   GLUCOSE 168*       No results for input(s): COVID19 in the last 720 hours.     VALERIE Rodriguez CNP   Electronically signed 4/4/2022 at 10:29 AM

## 2022-04-04 NOTE — ANESTHESIA PRE PROCEDURE
Department of Anesthesiology  Preprocedure Note       Name:  Aziza Mills   Age:  49 y.o.  :  1970                                          MRN:  [id number]         Date:  2022      Surgeon: Roc Ruth):  Bailey Joaquin MD    Procedure: Procedure(s):  1ST STAGE JOHANSON URETHROPLASTY    Medications prior to admission:   Prior to Admission medications    Medication Sig Start Date End Date Taking? Authorizing Provider   benzonatate (TESSALON) 100 MG capsule Take 100 mg by mouth 3 times daily as needed 3/30/22  Yes Historical Provider, MD   insulin glargine (LANTUS) 100 UNIT/ML injection vial Inject 35 Units into the skin nightly    Historical Provider, MD   Semaglutide (OZEMPIC, 1 MG/DOSE, SC) Inject 1 mg into the skin once a week On Sundays    Historical Provider, MD   lisinopril (PRINIVIL;ZESTRIL) 2.5 MG tablet Take 2.5 mg by mouth daily    Historical Provider, MD   spironolactone (ALDACTONE) 25 MG tablet  21   Historical Provider, MD   cetirizine (ZYRTEC) 10 MG tablet Take 10 mg by mouth daily    Historical Provider, MD   alogliptin (NESINA) 25 MG TABS tablet Take 25 mg by mouth daily    Historical Provider, MD   metFORMIN (GLUCOPHAGE) 1000 MG tablet Take 1,000 mg by mouth 2 times daily (with meals)    Historical Provider, MD   nystatin (MYCOSTATIN) 933033 UNIT/GM powder Apply 2 times daily for 10 days.  21   Justin Antonio MD   diclofenac sodium (VOLTAREN) 1 % GEL Apply 2 g topically 4 times daily as needed  2/3/20   Historical Provider, MD   buPROPion (WELLBUTRIN XL) 300 MG extended release tablet TAKE 1 TABLET BY MOUTH  EVERY MORNING 19   Tamara Ortiz MD   spironolactone (ALDACTONE) 50 MG tablet TAKE 1 TABLET BY MOUTH TWO  TIMES DAILY 10/28/19   Tamara Ortiz MD   omeprazole (PRILOSEC) 20 MG delayed release capsule TAKE 1 CAPSULE BY MOUTH  DAILY 19   Tamara Ortiz MD   simvastatin (ZOCOR) 40 MG tablet Take 1 tablet by mouth  nightly 19   Juarez Vuong MD Multiple Vitamin (ONE-A-DAY MENS PO) Take by mouth daily    Historical Provider, MD   albuterol sulfate  (90 Base) MCG/ACT inhaler Inhale 2 puffs into the lungs every 6 hours as needed for Shortness of Breath 5/24/18   Tamara Moser MD   Misc Natural Products (GLUCOSAMINE CHONDROITIN ADV) TABS Take by mouth    Historical Provider, MD   Elastic Bandages & Supports (V-2 HIGH COMPRESSION HOSE) MISC 30-40mmHg pressure stockings, knee high. Wear 12 hours while awake and remove at bedtime 3/22/18   Tamara Moser MD   Blood Glucose Monitoring Suppl ([employer] CONTOUR MONITOR) W/DEVICE KIT use twice a day 4/17/17   Historical Provider, MD   MICROLET LANCETS MISC use twice a day 4/17/17   Historical Provider, MD   fluticasone (FLONASE) 50 MCG/ACT SUSP 1 spray by Nasal route 2 times daily  Patient taking differently: 1 spray by Nasal route as needed  7/12/15   Rosario Muñoz MD   glucose blood VI test strips (BL TEST STRIP PACK) strip 1 each by Does not apply route 2 times daily. As needed. 5/16/14   Neel Gutierres MD   nitroGLYCERIN (NITROSTAT) 0.4 MG SL tablet Place 0.4 mg under the tongue every 5 minutes as needed. Historical Provider, MD   CRANBERRY 1,680 mg by Does not apply route. Historical Provider, MD   FISH OIL Take 1,000 mg by mouth daily. Please get dosage    Historical MD Rodolfo   VITAMIN D PO Take 2,000 Int'l Units by mouth Please get dosage      Historical Provider, MD   aspirin 81 MG tablet Take 81 mg by mouth daily.       Historical Provider, MD       Current medications:    Current Facility-Administered Medications   Medication Dose Route Frequency Provider Last Rate Last Admin    sodium chloride flush 0.9 % injection 5-40 mL  5-40 mL IntraVENous 2 times per day Selma Hansen MD        sodium chloride flush 0.9 % injection 5-40 mL  5-40 mL IntraVENous PRN Selma Hansen MD        0.9 % sodium chloride infusion  25 mL IntraVENous PRN Selma Hansen MD        fentaNYL (SUBLIMAZE) injection 25 mcg  25 mcg IntraVENous Q5 Min PRN Cecelia Closs, MD        fentaNYL (SUBLIMAZE) injection 50 mcg  50 mcg IntraVENous Q5 Min PRN Cecelia Closs, MD        oxyCODONE (ROXICODONE) immediate release tablet 5 mg  5 mg Oral PRN Cecelia Closs, MD        Or   Swisher Nikolay oxyCODONE (ROXICODONE) immediate release tablet 10 mg  10 mg Oral PRN Cecelia Closs, MD        ondansetron [facility]) injection 4 mg  4 mg IntraVENous Once PRN Cecelia Closs, MD           Allergies:  No Known Allergies    Problem List:    Patient Active Problem List   Diagnosis Code    Hyperlipidemia E78.5    MVP (mitral valve prolapse) I34.1    Sleep apnea G47.30    Diabetes mellitus (HCC) E11.9    GERD (gastroesophageal reflux disease) K21.9    Asthma J91.937    Nonalcoholic hepatosteatosis T36.5    Constipation K59.00    Gastroparesis K31.84    Mixed hyperlipidemia E78.2    Essential hypertension I10    Recurrent nephrolithiasis E07.5    Complicated UTI (urinary tract infection) N39.0    Cellulitis of left lower extremity L03.116       Past Medical History:        Diagnosis Date    Acute pyelonephritis 10/30/2018    Anemia     Anxiety     Arthritis     osteoarthitis     Asthma     Atrophic kidney     left side    Cellulitis of left lower extremity 44/25/5683    Complicated UTI (urinary tract infection) 10/01/2021    Depression     Essential hypertension 11/21/2016    Frequent UTI     GERD (gastroesophageal reflux disease)     Hyperlipidemia     Left ventricular enlargement     See ECHO 11/06/2019    Liver disease     Migraines     MRSA infection     Left knee, states \"rare strain\"    MVP (mitral valve prolapse)     Neuropathy     hands and left foot     Nonalcoholic hepatosteatosis 0/4/1875    Recurrent nephrolithiasis 10/31/2018    Screening PSA (prostate specific antigen) 07/13/2009    Skipped beats     Patient states skipped beats or extra beats noted in the past    Sleep apnea     Type 2 diabetes mellitus ([address]     Umbilical hernia  Unspecified sleep apnea     BiPAP    Urethral stricture        Past Surgical History:        Procedure Laterality Date    ANTERIOR CRUCIATE LIGAMENT REPAIR Left 2009    CYSTOSCOPY      CYSTOSCOPY N/A 2021    CYSTOSCOPY URETHRAL DILATATION performed by Krys Sandoval MD at [address]      D/t cross bite, \"broke jaw\", moved teeth out, also had braces    OTHER SURGICAL HISTORY      Surgery at 18 months for hypospadias    TONSILLECTOMY      UPPP UVULOPALATOPHARYGOPLASTY      W/tonsillectomy    VARICOSE VEIN SURGERY         Social History:    Social History     Tobacco Use    Smoking status: Former Smoker     Packs/day: 0.00     Years: 1.00     Pack years: 0.00     Types: Cigarettes     Quit date: 2015     Years since quittin.7    Smokeless tobacco: Never Used   Substance Use Topics    Alcohol use: Yes     Alcohol/week: 0.0 standard drinks     Comment: 1-2/month                                Counseling given: Not Answered      Vital Signs (Current):   Vitals:    22 1009 22 1010   BP:  130/65   Pulse:  73   Resp:  20   Temp:  97.1 °F (36.2 °C)   TempSrc:  Temporal   SpO2:  99%   Weight: (!) 380 lb (172.4 kg)    Height: 5' 7\" (1.702 m)                                               BP Readings from Last 3 Encounters:   22 130/65   22 (!) 150/68   22 130/82       NPO Status:                                                                                 BMI:   Wt Readings from Last 3 Encounters:   22 (!) 380 lb (172.4 kg)   22 (!) 380 lb (172.4 kg)   22 (!) 380 lb (172.4 kg)     Body mass index is 59.52 kg/m².     CBC:   Lab Results   Component Value Date    WBC 14.7 2022    RBC 3.73 2022    HGB 9.5 2022    HCT 31.6 2022    MCV 84.7 2022    RDW 14.7 2022     2022       CMP:   Lab Results   Component Value Date     2022    K 3.9 2022     04/01/2022    CO2 22 04/01/2022    BUN 24 04/01/2022    CREATININE 1.16 04/01/2022    GFRAA >60 04/01/2022    LABGLOM >60 04/01/2022    GLUCOSE 168 04/01/2022    PROT 7.6 10/01/2021    CALCIUM 9.5 04/01/2022    BILITOT 0.29 10/01/2021    ALKPHOS 100 10/01/2021    AST 21 10/01/2021    ALT 22 10/01/2021       POC Tests: No results for input(s): POCGLU, POCNA, POCK, POCCL, POCBUN, POCHEMO, POCHCT in the last 72 hours. Coags:   Lab Results   Component Value Date    PROTIME 10.1 07/12/2015    INR 0.9 07/12/2015    APTT 24.1 07/12/2015       HCG (If Applicable): No results found for: PREGTESTUR, PREGSERUM, HCG, HCGQUANT     ABGs: No results found for: PHART, PO2ART, YUX3EBY, VLY8GDS, BEART, V4JIPRVB     Type & Screen (If Applicable):  No results found for: LABABO, LABRH    Drug/Infectious Status (If Applicable):  No results found for: HIV, HEPCAB    COVID-19 Screening (If Applicable):   Lab Results   Component Value Date    COVID19 Not Detected 10/01/2021           Anesthesia Evaluation  Patient summary reviewed and Nursing notes reviewed no history of anesthetic complications:   Airway: Mallampati: II  TM distance: >3 FB   Neck ROM: full  Mouth opening: > = 3 FB Dental: normal exam         Pulmonary:normal exam  breath sounds clear to auscultation  (+) sleep apnea:  asthma: seasonal asthma,                            Cardiovascular:    (+) hypertension: moderate, dysrhythmias:,       ECG reviewed      Echocardiogram reviewed                  Neuro/Psych:   (+) headaches: migraine headaches, psychiatric history: stable with treatment            GI/Hepatic/Renal:   (+) GERD:, liver disease:,           Endo/Other:    (+) DiabetesType II DM, , .                 Abdominal:             Vascular: negative vascular ROS. Other Findings:             Anesthesia Plan      general     ASA 3       Induction: intravenous. MIPS: Postoperative opioids intended. Anesthetic plan and risks discussed with patient.       Plan discussed with CRNA.                   Marilyn Clarke MD   4/4/2022

## 2022-04-04 NOTE — PROGRESS NOTES
RN called into OR 5 regarding discharge instructions. Per Dr. Ruth Hatfield patient instructed to put neosporin on site BID. Bhatti is not to be removed and will remain in place at discharge.

## 2022-04-04 NOTE — OP NOTE
Operative Note      Patient: Dalila Sanabria  YOB: 1970  MRN: [id number]    Date of Procedure: 4/4/2022    Pre-Op Diagnosis: URETHRAL STRICTURE/ HYPOSPADIAS    Post-Op Diagnosis: Same       Procedure(s):  1ST STAGE Ifeoma Mcdonnell URETHROPLASTY    Surgeon(s):  Yeni Cunningham MD    Assistant:   Surgical Assistant: Meryle Flasher    Anesthesia: General    Estimated Blood Loss (mL): Minimal    Complications: None    Specimens:   * No specimens in log *    Implants:  * No implants in log *      Drains:   Urethral Catheter Double-lumen;Non-latex 16 fr (Active)   Catheter Indications Perioperative use for selected surgical procedures 04/04/22 1400   Site Assessment No urethral drainage 04/04/22 1400   Urine Color Yellow 04/04/22 1400   Urine Appearance Clear 04/04/22 1400   Output (mL) 700 mL 04/04/22 1340       Findings: Densely scarred pendulous urethra. Detailed Description of Procedure:   Patient is a pleasant 56-year-old male who was born with hypospadias. He has had several dilations over the course of his life for recalcitrant urethral strictures. He is then referred to me. Retrograde urethrogram demonstrated an pendulous urethral stricture. We discussed urethroplasty. Given history of hypospadias and several dilations we discussed a two-stage Sheri procedure. He was in agreement. After risks and benefits were explained he elected to proceed with stage I procedure today. After informed sent was obtained the preoperative area he was taken back to the operating room transferred to our table in the supine position. Anesthesia was used. After given. He remained in the supine position. He was sterilely prepped and draped in standard fashion. Timeout occurred in which 2 patient identifiers were used. At this time we placed the glans retraction stitch. With the penis on stretch I incised on the ventral aspect and open the urethra widely.   I obtained [place] dilators and was able to easily pass them into the bladder without any issues. This demonstrated wide patency of the urethra. The urethra that we did open which was the entire pendulous urethra was quite scarred. I then reapproximated the urethral edge to the skin edge circumferentially. At this point I was able to easily place a Bhatti catheter into the urinary bladder. Was 12 [place] in size. When urine returned the balloon was instilled with 10 cc of sterile water. Bacitracin was placed. Patient was awakened and discharged back to hospital recovery in good and stable condition. Follow-up:  Patient will be scheduled for a stage II procedure in 3 to 6 months.     Electronically signed by Robel Schwartz MD on 4/4/2022 at 2:07 PM

## 2022-06-04 NOTE — PROGRESS NOTES
Review of Systems   Constitutional: Negative. Negative for appetite change, chills and fatigue. Eyes: Negative. Negative for pain, redness and visual disturbance. Respiratory: Negative. Negative for cough, shortness of breath and wheezing. Cardiovascular: Negative. Negative for chest pain and leg swelling. Gastrointestinal: Positive for abdominal pain and constipation. Negative for diarrhea, nausea and vomiting. Genitourinary: Positive for dysuria and flank pain. Negative for difficulty urinating, frequency, hematuria and urgency. Musculoskeletal: Negative for back pain, joint swelling and myalgias. Skin: Negative. Negative for rash and wound. Neurological: Negative. Negative for dizziness, weakness and numbness. Hematological: Negative. Does not bruise/bleed easily. No

## 2022-09-19 ENCOUNTER — HOSPITAL ENCOUNTER (OUTPATIENT)
Dept: PREADMISSION TESTING | Age: 52
Discharge: HOME | End: 2022-09-23
Payer: COMMERCIAL

## 2022-09-19 VITALS
HEIGHT: 67 IN | HEART RATE: 74 BPM | RESPIRATION RATE: 16 BRPM | WEIGHT: 315 LBS | SYSTOLIC BLOOD PRESSURE: 130 MMHG | DIASTOLIC BLOOD PRESSURE: 59 MMHG | OXYGEN SATURATION: 98 % | TEMPERATURE: 98.4 F | BODY MASS INDEX: 49.44 KG/M2

## 2022-09-19 LAB
ANION GAP SERPL CALCULATED.3IONS-SCNC: 13 MMOL/L (ref 9–17)
BUN BLDV-MCNC: 20 MG/DL (ref 6–20)
BUN/CREAT BLD: 20 (ref 9–20)
CALCIUM SERPL-MCNC: 9.3 MG/DL (ref 8.6–10.4)
CHLORIDE BLD-SCNC: 104 MMOL/L (ref 98–107)
CO2: 19 MMOL/L (ref 20–31)
CREAT SERPL-MCNC: 1.02 MG/DL (ref 0.7–1.2)
GFR AFRICAN AMERICAN: >60 ML/MIN
GFR NON-AFRICAN AMERICAN: >60 ML/MIN
GFR SERPL CREATININE-BSD FRML MDRD: ABNORMAL ML/MIN/{1.73_M2}
GLUCOSE BLD-MCNC: 128 MG/DL (ref 70–99)
HCT VFR BLD CALC: 29.8 % (ref 40.7–50.3)
HEMOGLOBIN: 9 G/DL (ref 13–17)
MCH RBC QN AUTO: 24.8 PG (ref 25.2–33.5)
MCHC RBC AUTO-ENTMCNC: 30.2 G/DL (ref 28.4–34.8)
MCV RBC AUTO: 82.1 FL (ref 82.6–102.9)
NRBC AUTOMATED: 0 PER 100 WBC
PDW BLD-RTO: 15.2 % (ref 11.8–14.4)
PLATELET # BLD: 326 K/UL (ref 138–453)
PMV BLD AUTO: 9 FL (ref 8.1–13.5)
POTASSIUM SERPL-SCNC: 3.8 MMOL/L (ref 3.7–5.3)
RBC # BLD: 3.63 M/UL (ref 4.21–5.77)
SODIUM BLD-SCNC: 136 MMOL/L (ref 135–144)
WBC # BLD: 13.8 K/UL (ref 3.5–11.3)

## 2022-09-19 PROCEDURE — 83036 HEMOGLOBIN GLYCOSYLATED A1C: CPT

## 2022-09-19 PROCEDURE — 36415 COLL VENOUS BLD VENIPUNCTURE: CPT

## 2022-09-19 PROCEDURE — 80048 BASIC METABOLIC PNL TOTAL CA: CPT

## 2022-09-19 PROCEDURE — 85027 COMPLETE CBC AUTOMATED: CPT

## 2022-09-19 PROCEDURE — 93005 ELECTROCARDIOGRAM TRACING: CPT | Performed by: ANESTHESIOLOGY

## 2022-09-19 RX ORDER — LOSARTAN POTASSIUM 25 MG/1
25 TABLET ORAL DAILY
COMMUNITY
Start: 2022-02-14 | End: 2022-09-19

## 2022-09-19 RX ORDER — LOSARTAN POTASSIUM 25 MG/1
25 TABLET ORAL DAILY
COMMUNITY

## 2022-09-19 NOTE — PRE-PROCEDURE INSTRUCTIONS
On the Day of Your Surgery, Monday, October 3rd, 2022. Please Arrive At 9:25 AM     Enter the hospital through the Main Entrance, take the lobby elevators to the second floor and check in at the Surgery Registration desk. Continue to take your home medications as you normally do up to and including the night before surgery with the exception of blood thinning medications. Blood Thinning Medications:  Please stop prescription blood thinning medications such as Apixaban (Eliquis); Clopidogrel (Plavix); Dabigatran (Pradaxa); Prasugrel (Effient); Rivaroxaban (Xarelto); Ticagrelor (Brilinta); Warfarin (Coumadin) only as directed by your surgeon and/or the prescribing physician    Some common examples of other medications that can thin your blood are: Aspirin, Ibuprofen (Advil, Motrin), Naproxen (Aleve), Meloxicam (Mobic), Celecoxib (Celebrex), Fish Oil, many Herbal Supplements. These medications should usually be stopped at least 7 days prior to surgery. Please stop supplements 7 days prior to surgery: glucosamine, cranberry, fish oil, multivitamin    Tylenol is OK to take for pain the week prior to surgery. Failure to stop certain medications may interfere with your scheduled surgery. If you receive instructions from your surgeon regarding what medications to stop prior to surgery, please follow those specific instructions. If You Have Diabetes:  Do not take any of your diabetic medications, (injectables or by mouth) the morning of surgery unless otherwise instructed by the doctor who manages your diabetes. If you are taking insulin, contact the doctor the manages your diabetes for instructions about any changes to your insulin dosages the day before surgery. Please take the following medication(s) the day of surgery with small sips of water: Wellbutrin, omeprazole    Please use your inhaler(s) if needed and bring your inhaler(s) from home the day of surgery.     Showering Before Surgery: You can play an important role in your own health by carefully washing before surgery. Shower the night before or morning of surgery and wash all over with antibacterial soap. Additional Instructions:      1. If you are having any type of anesthesia, you are to have NOTHING to eat or drink after midnight the night before surgery. This includes no gum, hard candy, mints or water. The only exception to this is small sips of water to take the medications listed above. No smoking or chewing tobacco after midnight. No alcoholic beverages for 24 hours prior to surgery. 2. Brush your teeth but do not swallow any water. 3. If you wear glasses bring a case for them if you have one. No contacts should be worn the day of surgery. You may also bring your hearing aids. If you have dentures, most surgical procedures involving anesthesia will require that you remove them prior to surgery. 4. If you sleep with a CPAP or BiPAP machine at home and plan on staying in the hospital overnight after surgery, please bring your machine with you. 5. Do not wear any jewelry or body piercings the day of surgery. No nail polish on the operative extremity (arm/hand or leg/foot surgeries)   6. If you are staying overnight with us, you may bring a small bag of necessary personal items. 7. Please wear loose, comfortable clothing. If you are potentially going to have a cast, sling, brace or bulky dressing, make sure to wear clothing that will fit over it. 8. In case of illness - If you have cold or flu like symptoms (high fever, runny nose, sore throat, cough, etc.) rash, nausea, vomiting, loose stools, and/or recent contact with someone who has a contagious disease (chicken pox, measles, COVID-19, etc.). Please call your surgeon before coming to the hospital.    Transportation After Your Surgery/Procedure: If you are going home the same day of surgery you need someone to drive you home.   Your  must be at least 25years of age. A taxi cab or other nonmedical public transportation is not acceptable unless you have someone to ride home in the vehicle with you. For your safety, someone must remain with you for the first 24 hours after your surgery if you receive anesthesia or medication. If you do not have someone to stay with you, your procedure may be cancelled. As a patient at [facility] you can expect quality medical and nursing care that is centered on you individual needs. Our goal is to make your surgical experience as comfortable as possible.     Any questions about preparing for your surgery please call [phone].      ____________________________   ____________________________  Signature (Patient)                                 Signature (Nurse)                     Date

## 2022-09-21 LAB
EKG ATRIAL RATE: 80 BPM
EKG P AXIS: 47 DEGREES
EKG P-R INTERVAL: 198 MS
EKG Q-T INTERVAL: 396 MS
EKG QRS DURATION: 98 MS
EKG QTC CALCULATION (BAZETT): 456 MS
EKG R AXIS: -25 DEGREES
EKG T AXIS: 51 DEGREES
EKG VENTRICULAR RATE: 80 BPM
ESTIMATED AVERAGE GLUCOSE: 160 MG/DL
HBA1C MFR BLD: 7.2 % (ref 4–6)

## 2022-10-03 ENCOUNTER — ANESTHESIA (OUTPATIENT)
Dept: OPERATING ROOM | Age: 52
End: 2022-10-03
Payer: COMMERCIAL

## 2022-10-03 ENCOUNTER — HOSPITAL ENCOUNTER (OUTPATIENT)
Age: 52
Setting detail: OUTPATIENT SURGERY
Discharge: HOME | End: 2022-10-03
Attending: UROLOGY | Admitting: UROLOGY
Payer: COMMERCIAL

## 2022-10-03 ENCOUNTER — ANESTHESIA EVENT (OUTPATIENT)
Dept: OPERATING ROOM | Age: 52
End: 2022-10-03
Payer: COMMERCIAL

## 2022-10-03 VITALS
RESPIRATION RATE: 19 BRPM | HEART RATE: 71 BPM | SYSTOLIC BLOOD PRESSURE: 125 MMHG | DIASTOLIC BLOOD PRESSURE: 65 MMHG | BODY MASS INDEX: 49.44 KG/M2 | OXYGEN SATURATION: 96 % | HEIGHT: 67 IN | TEMPERATURE: 97.3 F | WEIGHT: 315 LBS

## 2022-10-03 DIAGNOSIS — G89.18 POST-OPERATIVE PAIN: Primary | ICD-10-CM

## 2022-10-03 LAB — GLUCOSE BLD-MCNC: 127 MG/DL (ref 75–110)

## 2022-10-03 PROCEDURE — 3700000001 HC ADD 15 MINUTES (ANESTHESIA): Performed by: UROLOGY

## 2022-10-03 PROCEDURE — 3700000000 HC ANESTHESIA ATTENDED CARE: Performed by: UROLOGY

## 2022-10-03 PROCEDURE — 6360000002 HC RX W HCPCS: Performed by: NURSE ANESTHETIST, CERTIFIED REGISTERED

## 2022-10-03 PROCEDURE — 2709999900 HC NON-CHARGEABLE SUPPLY: Performed by: UROLOGY

## 2022-10-03 PROCEDURE — 7100000000 HC PACU RECOVERY - FIRST 15 MIN: Performed by: UROLOGY

## 2022-10-03 PROCEDURE — 7100000011 HC PHASE II RECOVERY - ADDTL 15 MIN: Performed by: UROLOGY

## 2022-10-03 PROCEDURE — 3600000012 HC SURGERY LEVEL 2 ADDTL 15MIN: Performed by: UROLOGY

## 2022-10-03 PROCEDURE — 2580000003 HC RX 258: Performed by: ANESTHESIOLOGY

## 2022-10-03 PROCEDURE — 7100000001 HC PACU RECOVERY - ADDTL 15 MIN: Performed by: UROLOGY

## 2022-10-03 PROCEDURE — 2500000003 HC RX 250 WO HCPCS: Performed by: NURSE ANESTHETIST, CERTIFIED REGISTERED

## 2022-10-03 PROCEDURE — 82947 ASSAY GLUCOSE BLOOD QUANT: CPT

## 2022-10-03 PROCEDURE — 2500000003 HC RX 250 WO HCPCS: Performed by: UROLOGY

## 2022-10-03 PROCEDURE — 7100000010 HC PHASE II RECOVERY - FIRST 15 MIN: Performed by: UROLOGY

## 2022-10-03 PROCEDURE — 82948 REAGENT STRIP/BLOOD GLUCOSE: CPT

## 2022-10-03 PROCEDURE — 3600000002 HC SURGERY LEVEL 2 BASE: Performed by: UROLOGY

## 2022-10-03 RX ORDER — SODIUM CHLORIDE 0.9 % (FLUSH) 0.9 %
5-40 SYRINGE (ML) INJECTION PRN
Status: DISCONTINUED | OUTPATIENT
Start: 2022-10-03 | End: 2022-10-03 | Stop reason: HOSPADM

## 2022-10-03 RX ORDER — LIDOCAINE HYDROCHLORIDE 10 MG/ML
1 INJECTION, SOLUTION EPIDURAL; INFILTRATION; INTRACAUDAL; PERINEURAL
Status: DISCONTINUED | OUTPATIENT
Start: 2022-10-03 | End: 2022-10-03 | Stop reason: HOSPADM

## 2022-10-03 RX ORDER — OXYCODONE HYDROCHLORIDE AND ACETAMINOPHEN 5; 325 MG/1; MG/1
1 TABLET ORAL EVERY 6 HOURS PRN
Qty: 30 TABLET | Refills: 0 | Status: SHIPPED | OUTPATIENT
Start: 2022-10-03 | End: 2022-10-08

## 2022-10-03 RX ORDER — MIDAZOLAM HYDROCHLORIDE 1 MG/ML
INJECTION INTRAMUSCULAR; INTRAVENOUS PRN
Status: DISCONTINUED | OUTPATIENT
Start: 2022-10-03 | End: 2022-10-03 | Stop reason: SDUPTHER

## 2022-10-03 RX ORDER — SODIUM CHLORIDE, SODIUM LACTATE, POTASSIUM CHLORIDE, CALCIUM CHLORIDE 600; 310; 30; 20 MG/100ML; MG/100ML; MG/100ML; MG/100ML
INJECTION, SOLUTION INTRAVENOUS CONTINUOUS
Status: DISCONTINUED | OUTPATIENT
Start: 2022-10-03 | End: 2022-10-03 | Stop reason: HOSPADM

## 2022-10-03 RX ORDER — DOXYCYCLINE HYCLATE 100 MG
100 TABLET ORAL 2 TIMES DAILY
Qty: 60 TABLET | Refills: 0 | Status: SHIPPED | OUTPATIENT
Start: 2022-10-03 | End: 2022-11-02

## 2022-10-03 RX ORDER — LIDOCAINE HYDROCHLORIDE 20 MG/ML
INJECTION, SOLUTION EPIDURAL; INFILTRATION; INTRACAUDAL; PERINEURAL PRN
Status: DISCONTINUED | OUTPATIENT
Start: 2022-10-03 | End: 2022-10-03 | Stop reason: SDUPTHER

## 2022-10-03 RX ORDER — ULTRASOUND COUPLING MEDIUM
GEL (GRAM) TOPICAL PRN
Status: DISCONTINUED | OUTPATIENT
Start: 2022-10-03 | End: 2022-10-03 | Stop reason: ALTCHOICE

## 2022-10-03 RX ORDER — ESMOLOL HYDROCHLORIDE 10 MG/ML
INJECTION INTRAVENOUS PRN
Status: DISCONTINUED | OUTPATIENT
Start: 2022-10-03 | End: 2022-10-03 | Stop reason: SDUPTHER

## 2022-10-03 RX ORDER — HYDROMORPHONE HYDROCHLORIDE 1 MG/ML
0.25 INJECTION, SOLUTION INTRAMUSCULAR; INTRAVENOUS; SUBCUTANEOUS EVERY 5 MIN PRN
Status: DISCONTINUED | OUTPATIENT
Start: 2022-10-03 | End: 2022-10-03 | Stop reason: HOSPADM

## 2022-10-03 RX ORDER — OXYCODONE HYDROCHLORIDE 5 MG/1
5 TABLET ORAL PRN
Status: DISCONTINUED | OUTPATIENT
Start: 2022-10-03 | End: 2022-10-03 | Stop reason: HOSPADM

## 2022-10-03 RX ORDER — LIDOCAINE HYDROCHLORIDE AND EPINEPHRINE 10; 10 MG/ML; UG/ML
INJECTION, SOLUTION INFILTRATION; PERINEURAL PRN
Status: DISCONTINUED | OUTPATIENT
Start: 2022-10-03 | End: 2022-10-03 | Stop reason: ALTCHOICE

## 2022-10-03 RX ORDER — SODIUM CHLORIDE 9 MG/ML
INJECTION, SOLUTION INTRAVENOUS CONTINUOUS
Status: DISCONTINUED | OUTPATIENT
Start: 2022-10-03 | End: 2022-10-03 | Stop reason: HOSPADM

## 2022-10-03 RX ORDER — PROPOFOL 10 MG/ML
INJECTION, EMULSION INTRAVENOUS PRN
Status: DISCONTINUED | OUTPATIENT
Start: 2022-10-03 | End: 2022-10-03 | Stop reason: SDUPTHER

## 2022-10-03 RX ORDER — SODIUM CHLORIDE 0.9 % (FLUSH) 0.9 %
5-40 SYRINGE (ML) INJECTION EVERY 12 HOURS SCHEDULED
Status: DISCONTINUED | OUTPATIENT
Start: 2022-10-03 | End: 2022-10-03 | Stop reason: HOSPADM

## 2022-10-03 RX ORDER — ONDANSETRON 2 MG/ML
INJECTION INTRAMUSCULAR; INTRAVENOUS PRN
Status: DISCONTINUED | OUTPATIENT
Start: 2022-10-03 | End: 2022-10-03 | Stop reason: SDUPTHER

## 2022-10-03 RX ORDER — CEFAZOLIN SODIUM 1 G/3ML
INJECTION, POWDER, FOR SOLUTION INTRAMUSCULAR; INTRAVENOUS PRN
Status: DISCONTINUED | OUTPATIENT
Start: 2022-10-03 | End: 2022-10-03 | Stop reason: SDUPTHER

## 2022-10-03 RX ORDER — DIPHENHYDRAMINE HYDROCHLORIDE 50 MG/ML
12.5 INJECTION INTRAMUSCULAR; INTRAVENOUS
Status: DISCONTINUED | OUTPATIENT
Start: 2022-10-03 | End: 2022-10-03 | Stop reason: HOSPADM

## 2022-10-03 RX ORDER — HYDROMORPHONE HYDROCHLORIDE 1 MG/ML
0.5 INJECTION, SOLUTION INTRAMUSCULAR; INTRAVENOUS; SUBCUTANEOUS EVERY 5 MIN PRN
Status: DISCONTINUED | OUTPATIENT
Start: 2022-10-03 | End: 2022-10-03 | Stop reason: HOSPADM

## 2022-10-03 RX ORDER — SUCCINYLCHOLINE/SOD CL,ISO/PF 100 MG/5ML
SYRINGE (ML) INTRAVENOUS PRN
Status: DISCONTINUED | OUTPATIENT
Start: 2022-10-03 | End: 2022-10-03 | Stop reason: SDUPTHER

## 2022-10-03 RX ORDER — FENTANYL CITRATE 50 UG/ML
INJECTION, SOLUTION INTRAMUSCULAR; INTRAVENOUS PRN
Status: DISCONTINUED | OUTPATIENT
Start: 2022-10-03 | End: 2022-10-03 | Stop reason: SDUPTHER

## 2022-10-03 RX ORDER — SODIUM CHLORIDE 9 MG/ML
INJECTION, SOLUTION INTRAVENOUS PRN
Status: DISCONTINUED | OUTPATIENT
Start: 2022-10-03 | End: 2022-10-03 | Stop reason: HOSPADM

## 2022-10-03 RX ORDER — OXYCODONE HYDROCHLORIDE 5 MG/1
2.5 TABLET ORAL PRN
Status: DISCONTINUED | OUTPATIENT
Start: 2022-10-03 | End: 2022-10-03 | Stop reason: HOSPADM

## 2022-10-03 RX ORDER — ONDANSETRON 2 MG/ML
4 INJECTION INTRAMUSCULAR; INTRAVENOUS
Status: DISCONTINUED | OUTPATIENT
Start: 2022-10-03 | End: 2022-10-03 | Stop reason: HOSPADM

## 2022-10-03 RX ORDER — LABETALOL HYDROCHLORIDE 5 MG/ML
INJECTION, SOLUTION INTRAVENOUS PRN
Status: DISCONTINUED | OUTPATIENT
Start: 2022-10-03 | End: 2022-10-03 | Stop reason: SDUPTHER

## 2022-10-03 RX ADMIN — Medication 100 MCG: at 13:34

## 2022-10-03 RX ADMIN — ESMOLOL HYDROCHLORIDE 25 MG: 100 INJECTION, SOLUTION INTRAVENOUS at 12:24

## 2022-10-03 RX ADMIN — SODIUM CHLORIDE, POTASSIUM CHLORIDE, SODIUM LACTATE AND CALCIUM CHLORIDE: 600; 310; 30; 20 INJECTION, SOLUTION INTRAVENOUS at 13:06

## 2022-10-03 RX ADMIN — LIDOCAINE HYDROCHLORIDE 100 MG: 20 INJECTION, SOLUTION EPIDURAL; INFILTRATION; INTRACAUDAL; PERINEURAL at 12:27

## 2022-10-03 RX ADMIN — Medication 100 MG: at 12:27

## 2022-10-03 RX ADMIN — ONDANSETRON 4 MG: 2 INJECTION INTRAMUSCULAR; INTRAVENOUS at 14:05

## 2022-10-03 RX ADMIN — PROPOFOL 250 MG: 10 INJECTION, EMULSION INTRAVENOUS at 12:27

## 2022-10-03 RX ADMIN — Medication 100 MCG: at 13:00

## 2022-10-03 RX ADMIN — PROPOFOL 100 MG: 10 INJECTION, EMULSION INTRAVENOUS at 13:33

## 2022-10-03 RX ADMIN — Medication 100 MCG: at 12:27

## 2022-10-03 RX ADMIN — MIDAZOLAM 2 MG: 1 INJECTION INTRAMUSCULAR; INTRAVENOUS at 12:14

## 2022-10-03 RX ADMIN — CEFAZOLIN 3000 MG: 1 INJECTION, POWDER, FOR SOLUTION INTRAMUSCULAR; INTRAVENOUS at 12:33

## 2022-10-03 RX ADMIN — SODIUM CHLORIDE, POTASSIUM CHLORIDE, SODIUM LACTATE AND CALCIUM CHLORIDE: 600; 310; 30; 20 INJECTION, SOLUTION INTRAVENOUS at 10:11

## 2022-10-03 RX ADMIN — ESMOLOL HYDROCHLORIDE 25 MG: 100 INJECTION, SOLUTION INTRAVENOUS at 12:25

## 2022-10-03 RX ADMIN — PROPOFOL 50 MG: 10 INJECTION, EMULSION INTRAVENOUS at 13:00

## 2022-10-03 RX ADMIN — LABETALOL HYDROCHLORIDE 7.5 MG: 5 INJECTION INTRAVENOUS at 13:39

## 2022-10-03 ASSESSMENT — PAIN - FUNCTIONAL ASSESSMENT: PAIN_FUNCTIONAL_ASSESSMENT: 0-10

## 2022-10-03 ASSESSMENT — PAIN SCALES - GENERAL
PAINLEVEL_OUTOF10: 0
PAINLEVEL_OUTOF10: 0

## 2022-10-03 ASSESSMENT — ENCOUNTER SYMPTOMS: SHORTNESS OF BREATH: 0

## 2022-10-03 NOTE — H&P
History and Physical Service   [address]    HISTORY AND PHYSICAL EXAMINATION            Date of Evaluation: 10/3/2022  Patient name:  Selene Ng  MRN:   [id number]  YOB: 1970  PCP:    Ijeoma Sosa MD    History Obtained From:     Patient, medical records    History of Present Illness: This is Selene Ng a 47 y.o. male who presents today for a STAGE 2 [place] by Kranthi Dalton MD FOR STRICTURE OF MALE URETHRA, UNSPECIFIED STRICTURE TYPE [N35.919]. Patient Hx hypospadia surgery at 18 months. Hx recurrent UTI's since fall 2021. He is currently followed by Urologist, Dr Darrell Ramos . He is s/p First stage Shanice urethroplasty 4/4/22 and did well. He arrived today for his Second stage Johanson buccal urethroplasty. Patient denies fever, chills, shortness of breath, cough, congestion, wheezing, chest pain, open sores or wounds. ECHO 11/06/2019  CONCLUSIONS     Summary  Left ventricle is enlarged, normal wall thickness, global left ventricular  systolic function is normal, estimated ejection fraction is 60%. Trivial valve disease as below. Past Medical History:     Past Medical History:   Diagnosis Date    Acute pyelonephritis 10/30/2018    Anemia     Anxiety     Arthritis     osteoarthitis     Asthma     Atrophic kidney     left side    Cellulitis of left lower extremity 28/76/7244    Complicated UTI (urinary tract infection) 10/01/2021    Depression     Essential hypertension 11/21/2016    Frequent UTI     GERD (gastroesophageal reflux disease)     Hyperlipidemia     On statin (9/19/22)    Left ventricular enlargement     See ECHO 11/06/2019    Liver disease     Migraines     MRSA infection     Left knee, states \"rare strain\"    MVP (mitral valve prolapse)     Neuropathy     hands and left foot     Nonalcoholic hepatosteatosis 61/43/7676    Prolonged emergence from general anesthesia     Per pt.     Recurrent nephrolithiasis 10/31/2018 Screening PSA (prostate specific antigen) 07/13/2009    Skipped beats     Patient states skipped beats or extra beats noted in the past    Sleep apnea     Type 2 diabetes mellitus ([address]     Checks blood sugar at home, averages 130-150, last A1C 11.8%, managed by endocrinology and PCP through the [facility] (1/76/73)    Umbilical hernia     Unspecified sleep apnea     BiPAP    Urethral stricture         Past Surgical History:     Past Surgical History:   Procedure Laterality Date    ANTERIOR CRUCIATE LIGAMENT REPAIR Left 02/2009    CYSTOSCOPY N/A 11/12/2021    CYSTOSCOPY URETHRAL DILATATION performed by Merna Karimi MD at [facility]      D/t cross bite, \"broke jaw\", moved teeth out, also had braces    OTHER SURGICAL HISTORY      Surgery at 18 months for hypospadias    TONSILLECTOMY  2005    UPPP UVULOPALATOPHARYGOPLASTY  2005    W/tonsillectomy    URETHROPLASTY N/A 04/04/2022    1ST STAGE Marcine Lair URETHROPLASTY performed by Brenton Garcia MD at [address] [facility]          Medications Prior to Admission:     Prior to Admission medications    Medication Sig Start Date End Date Taking? Authorizing Provider   losartan (COZAAR) 25 MG tablet Take 25 mg by mouth daily    Historical Provider, MD   benzonatate (TESSALON) 100 MG capsule Take 100 mg by mouth 3 times daily as needed 3/30/22   Historical Provider, MD   insulin glargine (LANTUS) 100 UNIT/ML injection vial Inject 50 Units into the skin nightly    Historical Provider, MD   Semaglutide (OZEMPIC, 1 MG/DOSE, SC) Inject 1 mg into the skin once a week On Sundays    Historical Provider, MD   cetirizine (ZYRTEC) 10 MG tablet Take 10 mg by mouth daily    Historical Provider, MD   metFORMIN (GLUCOPHAGE) 1000 MG tablet Take 1,000 mg by mouth 2 times daily (with meals)    Historical Provider, MD   nystatin (MYCOSTATIN) 119194 UNIT/GM powder Apply 2 times daily for 10 days.  8/31/21   Justin Antonio MD   diclofenac sodium (VOLTAREN) 1 % GEL Apply 2 g topically 4 times daily as needed  2/3/20   Historical Provider, MD   buPROPion (WELLBUTRIN XL) 300 MG extended release tablet TAKE 1 TABLET BY MOUTH  EVERY MORNING 11/19/19   Tmaara Turpin MD   spironolactone (ALDACTONE) 50 MG tablet TAKE 1 TABLET BY MOUTH TWO  TIMES DAILY 10/28/19   Taamra Turpin MD   omeprazole (PRILOSEC) 20 MG delayed release capsule TAKE 1 CAPSULE BY MOUTH  DAILY 7/17/19   Tamara Turpin MD   simvastatin (ZOCOR) 40 MG tablet Take 1 tablet by mouth  nightly 4/23/19   Tamara Turpin MD   Multiple Vitamin (ONE-A-DAY MENS PO) Take by mouth daily    Historical Provider, MD   albuterol sulfate  (90 Base) MCG/ACT inhaler Inhale 2 puffs into the lungs every 6 hours as needed for Shortness of Breath 5/24/18   Tamara Turpin MD   Misc Natural Products (GLUCOSAMINE CHONDROITIN ADV) TABS Take 1 tablet by mouth daily    Historical Provider, MD   Elastic Bandages & Supports (V-2 HIGH COMPRESSION HOSE) MISC 30-40mmHg pressure stockings, knee high. Wear 12 hours while awake and remove at bedtime 3/22/18   Tamara Turpin MD   Blood Glucose Monitoring Suppl ([employer] CONTOUR MONITOR) W/DEVICE KIT use twice a day 4/17/17   Bora Provider, MD   MICROLET LANCETS MISC use twice a day 4/17/17   Historical Provider, MD   fluticasone (FLONASE) 50 MCG/ACT SUSP 1 spray by Nasal route 2 times daily  Patient taking differently: 1 spray by Nasal route as needed 7/12/15   Phil Abdalla MD   glucose blood VI test strips (BL TEST STRIP PACK) strip 1 each by Does not apply route 2 times daily. As needed. 5/16/14   Neel Gutierres MD   nitroGLYCERIN (NITROSTAT) 0.4 MG SL tablet Place 0.4 mg under the tongue every 5 minutes as needed. Historical Provider, MD   CRANBERRY 500 mg by Does not apply route    Historical Provider, MD   FISH OIL Take 1,000 mg by mouth daily. Please get dosage    Historical Provider, MD   aspirin 81 MG tablet Take 81 mg by mouth daily.       Historical Provider, MD Allergies:     Patient has no known allergies. Social History:     Tobacco:    reports that he quit smoking about 7 years ago. His smoking use included cigarettes. He has never used smokeless tobacco.  Alcohol:      reports current alcohol use. Drug Use:  reports no history of drug use. Family History:     Family History   Adopted: Yes       Review of Systems:     Positive and Negative as described in HPI. CONSTITUTIONAL:  negative for fevers, chills, sweats, fatigue, weight loss  HEENT: glasses  Hx tinnitus  negative for vision, hearing changes, runny nose, throat pain  RESPIRATORY: CHELSEA uses nighthly BiPAP  Hx asthma and follows with Dr Kayla Crawford and uses an albuterol inhaler prn negative for shortness of breath, cough, congestion, wheezing. CARDIOVASCULAR: Hx MVP and intermittent palpitations Follows with cardiology No recent palpitations, dizziness or lightheaded. Still has dyspnea with climbing stairs   negative for chest pain. GASTROINTESTINAL:  negative for nausea, vomiting, diarrhea, constipation, change in bowel habits, abdominal pain   GENITOURINARY: See HPI   INTEGUMENT:  negative for rash, skin lesions, easy bruising   HEMATOLOGIC/LYMPHATIC: swelling lower legs    ALLERGIC/IMMUNOLOGIC:  negative for urticaria , itching  ENDOCRINE: DM Last A1c 7.2 9/19/22  negative increase in drinking, increase in urination, hot or cold intolerance  MUSCULOSKELETAL: knee arthralgia  negative joint pains, muscle aches, swelling of joints  NEUROLOGICAL:  negative for headaches, dizziness, lightheadedness, numbness, pain, tingling extremities  BEHAVIOR/PSYCH:  negative for depression, anxiety    Physical Exam:   BP (!) 147/68   Pulse 69   Temp 97.1 °F (36.2 °C) (Temporal)   Resp 24   Ht 5' 7\" (1.702 m)   Wt (!) 405 lb (183.7 kg)   SpO2 98%   BMI 63.43 kg/m²     Recent Labs     10/03/22  1013   POCGLU 127*       General Appearance:   Morbidly obese male (BMI 63.43 kg/m2) alert, well appearing, and in no acute distress  Mental status: oriented to person, place, and time with normal affect  Head:  normocephalic, atraumatic. Eye: glasses no icterus, redness, pupils equal and reactive, extraocular eye movements intact, conjunctiva clear  Ear: normal external ear, no discharge, hearing intact  Nose:  no drainage noted  Mouth: mucous membranes moist  Neck: supple, no carotid bruits, thyroid not palpable  Lungs: Bilateral equal air entry, diminished breath sounds throughout, unlabored at rest w/o use of accessory muscles, wheezing, rales or rhonchi, normal effort  Cardiovascular: HR 69 Heart sounds distant,  normal rate, regular rhythm, no murmur, gallop, rub. Abdomen: rotund  nontender, nondistended, normal bowel sounds  Neurologic: There are no new focal motor or sensory deficits, normal muscle tone and bulk, no abnormal sensation, normal speech, cranial nerves II through XII grossly intact  Skin: No gross lesions, rashes, bruising or bleeding on exposed skin area  Extremities:bilateral lower leg edema L>R   peripheral pulses palpable, no pedal edema or calf pain with palpation  Psych: normal affect     Investigations:      Laboratory Testing:  Recent Results (from the past 24 hour(s))   POC Glucose Fingerstick    Collection Time: 10/03/22 10:13 AM   Result Value Ref Range    POC Glucose 127 (H) 75 - 110 mg/dL       Recent Labs     09/19/22  1547   HGB 9.0*   HCT 29.8*   WBC 13.8*   MCV 82.1*      K 3.8      CO2 19*   BUN 20   CREATININE 1.02   GLUCOSE 128*       No results for input(s): COVID19 in the last 720 hours. Imaging/Diagnostics:    No results found. Diagnosis:      Stricture of male urethra, unspecified stricture type [N35.919]    Plans:     1.  STAGE 2 [address]      VALERIE Anne CNP  10/3/2022  10:20 AM

## 2022-10-03 NOTE — OP NOTE
Dr. Carson Archuleta MD  Urologic Surgery      [address] [facility] [address], [facility]. [place]  10/03/22    Patient:  Emerald Sanders  MRN: [id number]  YOB: 1970    Surgeon: Dr. Carson Archuleta MD  Assistant: None    Pre-op Diagnosis: Stricture of male urethra. History of failed hypospadias repair  Post-op Diagnosis: Same     Procedure:   Buccal mucosa urethroplasty. Shanice stage II  2. Glansplasty    Anesthesia: General  Complications: None  OR Blood Loss:  Minimal  Fluids: Cystalloids  Specimens:  * No specimens in log *    Indication:  76-year-old male with history of failed hypospadias. He underwent L2 stage I procedure presents today for stage II. After risks and benefits were explained he elected to proceed with today's procedure. Narrative of the Procedure:    After informed sounds obtained using the operating room and transferred after table in supine position. Anesthesia was used. Antibiotics given. He remained in the supine position. Sterilely prepped and draped in the standard fashion. Timeout occurred. I first went to his mouth. Mouth was sterilely prepped and draped using Betadine. Stensen's duct was located and marked using a skin marker. A 5 x 2 cm graft was marked out and hydrodistention was completed using lidocaine with epinephrine. 15 blade scalpel was used to circumscribe the area. The area was then elevated using a traction stitch and removed from the underlying muscle. Spot biopolar was used to control small oozing vessels. The suture line was reapproximated using a running 2-0 chromic stitch. The graft was defatted and placed in normal saline and brought down to the surgical field. At this point we then prescribed went down to the penis. I started by placing a glans retraction stitch. I then used a 15 blade scalpel to make a back plate incision. I then undermined the tissue to create an opening roughly 2-1/2 cm in width.   The graft was then sutured in using interrupted 4-0 Vicryl stitches. Roughly 30 stitches were placed circumferentially. I then quilted the graft using a interrupted 2-0 Vicryl stitch. I then created the outside flaps using a marking pen. 15 blade scalpel was used to make these incisions. We dissected down into the tissues to mobilize the flaps enough to bring him across the urethra. The urethra was then constructed using a running 2-0 Vicryl stitch. The suture line was reapproximated in strength and using a second 2-0 Vicryl running stitch. Glans plasty was completed using 4 interrupted 2-0 Vicryl stitches. 16 Ukrainian red rubber catheter was placed. A small in place using interrupted 2-0 Prolene stitches. We then reapproximated the ventral midline skin using a 4-0 Vicryl stitch. Dermabond was placed. Glans retraction suture was removed. This completed the procedure. The patient was then awakened and discharged back to hospital recovery room in good and stable condition. Follow-Up: Stent will remain in place for 4 weeks. He needs to see us back in 2 weeks to make sure that he is healing appropriately. I did discuss with him and his wife today and that he should place bacitracin ointment on his incision twice daily for 7 days.     Kranthi Dalton MD  Electronically signed on 10/3/2022 at 2:25 PM

## 2022-10-03 NOTE — ANESTHESIA PRE PROCEDURE
Department of Anesthesiology  Preprocedure Note       Name:  Ishan Mccall   Age:  47 y.o.  :  1970                                          MRN:  [id number]         Date:  10/3/2022      Surgeon: Choco Hamlin):  Emmy Rolon MD    Procedure: Procedure(s):  STAGE 2 JOHANSON BUCCAL URETHROPLASTY - SUPINE    Medications prior to admission:   Prior to Admission medications    Medication Sig Start Date End Date Taking? Authorizing Provider   losartan (COZAAR) 25 MG tablet Take 25 mg by mouth daily    Historical Provider, MD   benzonatate (TESSALON) 100 MG capsule Take 100 mg by mouth 3 times daily as needed 3/30/22   Historical Provider, MD   insulin glargine (LANTUS) 100 UNIT/ML injection vial Inject 50 Units into the skin nightly    Historical Provider, MD   Semaglutide (OZEMPIC, 1 MG/DOSE, SC) Inject 1 mg into the skin once a week On Sundays    Historical Provider, MD   cetirizine (ZYRTEC) 10 MG tablet Take 10 mg by mouth daily    Historical Provider, MD   metFORMIN (GLUCOPHAGE) 1000 MG tablet Take 1,000 mg by mouth 2 times daily (with meals)    Historical Provider, MD   nystatin (MYCOSTATIN) 974622 UNIT/GM powder Apply 2 times daily for 10 days.  21   Justin Antonio MD   diclofenac sodium (VOLTAREN) 1 % GEL Apply 2 g topically 4 times daily as needed  2/3/20   Historical Provider, MD   buPROPion (WELLBUTRIN XL) 300 MG extended release tablet TAKE 1 TABLET BY MOUTH  EVERY MORNING 19   Tamara Keller MD   spironolactone (ALDACTONE) 50 MG tablet TAKE 1 TABLET BY MOUTH TWO  TIMES DAILY 10/28/19   Tamara Keller MD   omeprazole (PRILOSEC) 20 MG delayed release capsule TAKE 1 CAPSULE BY MOUTH  DAILY 19   Tamara Keller MD   simvastatin (ZOCOR) 40 MG tablet Take 1 tablet by mouth  nightly 19   Tamara Keller MD   Multiple Vitamin (ONE-A-DAY MENS PO) Take by mouth daily    Historical Provider, MD   albuterol sulfate  (90 Base) MCG/ACT inhaler Inhale 2 puffs into the lungs every 6 hours as needed for Shortness of Breath 5/24/18   Tamara Thurman MD   Misc Natural Products (GLUCOSAMINE CHONDROITIN ADV) TABS Take 1 tablet by mouth daily    Historical Provider, MD   Elastic Bandages & Supports (V-2 HIGH COMPRESSION HOSE) MISC 30-40mmHg pressure stockings, knee high. Wear 12 hours while awake and remove at bedtime 3/22/18   Tamara Thurman MD   Blood Glucose Monitoring Suppl ([employer] CONTOUR MONITOR) W/DEVICE KIT use twice a day 4/17/17   Historical Provider, MD   MICROLET LANCETS MISC use twice a day 4/17/17   Historical Provider, MD   fluticasone (FLONASE) 50 MCG/ACT SUSP 1 spray by Nasal route 2 times daily  Patient taking differently: 1 spray by Nasal route as needed 7/12/15   Lashonda Martinez MD   glucose blood VI test strips (BL TEST STRIP PACK) strip 1 each by Does not apply route 2 times daily. As needed. 5/16/14   Neel Gutierres MD   nitroGLYCERIN (NITROSTAT) 0.4 MG SL tablet Place 0.4 mg under the tongue every 5 minutes as needed. Historical Provider, MD   CRANBERRY 500 mg by Does not apply route    Historical Provider, MD   FISH OIL Take 1,000 mg by mouth daily. Please get dosage    Historical Provider, MD   aspirin 81 MG tablet Take 81 mg by mouth daily.       Historical Provider, MD       Current medications:    Current Facility-Administered Medications   Medication Dose Route Frequency Provider Last Rate Last Admin    lidocaine PF 1 % injection 1 mL  1 mL IntraDERmal Once PRN Kathie Neal MD        0.9 % sodium chloride infusion   IntraVENous Continuous Ndal Dom Chávez MD        lactated ringers infusion   IntraVENous Continuous Kathie Neal  mL/hr at 10/03/22 1013 NoRateChange at 10/03/22 1013    sodium chloride flush 0.9 % injection 5-40 mL  5-40 mL IntraVENous 2 times per day Kathie Neal MD        sodium chloride flush 0.9 % injection 5-40 mL  5-40 mL IntraVENous PRN Kathie Neal MD        0.9 % sodium chloride infusion   IntraVENous PRN Kathie Neal MD Allergies:  No Known Allergies    Problem List:    Patient Active Problem List   Diagnosis Code    Hyperlipidemia E78.5    MVP (mitral valve prolapse) I34.1    Sleep apnea G47.30    Diabetes mellitus (HCC) E11.9    GERD (gastroesophageal reflux disease) K21.9    Asthma Z78.389    Nonalcoholic hepatosteatosis I82.8    Constipation K59.00    Gastroparesis K31.84    Mixed hyperlipidemia E78.2    Essential hypertension I10    Recurrent nephrolithiasis M67.1    Complicated UTI (urinary tract infection) N39.0    Cellulitis of left lower extremity L03.116       Past Medical History:        Diagnosis Date    Acute pyelonephritis 10/30/2018    Anemia     Anxiety     Arthritis     osteoarthitis     Asthma     Atrophic kidney     left side    Cellulitis of left lower extremity 01/11/4899    Complicated UTI (urinary tract infection) 10/01/2021    Depression     Essential hypertension 11/21/2016    Frequent UTI     GERD (gastroesophageal reflux disease)     Hyperlipidemia     On statin (9/19/22)    Left ventricular enlargement     See ECHO 11/06/2019    Liver disease     Migraines     MRSA infection     Left knee, states \"rare strain\"    MVP (mitral valve prolapse)     Neuropathy     hands and left foot     Nonalcoholic hepatosteatosis 85/85/8467    Prolonged emergence from general anesthesia     Per pt.     Recurrent nephrolithiasis 10/31/2018    Screening PSA (prostate specific antigen) 07/13/2009    Skipped beats     Patient states skipped beats or extra beats noted in the past    Sleep apnea     Type 2 diabetes mellitus ([address]     Checks blood sugar at home, averages 130-150, last A1C 11.8%, managed by endocrinology and PCP through the [facility] (5/26/02)    Umbilical hernia     Unspecified sleep apnea     BiPAP    Urethral stricture        Past Surgical History:        Procedure Laterality Date    ANTERIOR CRUCIATE LIGAMENT REPAIR Left 02/2009    CYSTOSCOPY N/A 11/12/2021 CYSTOSCOPY URETHRAL DILATATION performed by Shayy Rasmussen MD at [address]      D/t cross bite, \"broke jaw\", moved teeth out, also had braces    OTHER SURGICAL HISTORY      Surgery at 18 months for hypospadias    TONSILLECTOMY  2005    UPPP UVULOPALATOPHARYGOPLASTY      W/tonsillectomy    URETHROPLASTY N/A 2022    1ST STAGE [place]pah Spurr performed by Rebecca Juarez MD at [facility]         Social History:    Social History     Tobacco Use    Smoking status: Former     Packs/day: 0.00     Years: 1.00     Pack years: 0.00     Types: Cigarettes     Quit date: 2015     Years since quittin.2    Smokeless tobacco: Never   Substance Use Topics    Alcohol use: Yes     Alcohol/week: 0.0 standard drinks     Comment: 1-2/month                                Counseling given: Not Answered      Vital Signs (Current):   Vitals:    10/03/22 0951 10/03/22 0956   BP: (!) 147/68    Pulse: 69    Resp: 24    Temp: 97.1 °F (36.2 °C)    TempSrc: Temporal    SpO2: 98%    Weight:  (!) 405 lb (183.7 kg)   Height:  5' 7\" (1.702 m)                                              BP Readings from Last 3 Encounters:   10/03/22 (!) 147/68   22 (!) 130/59   22 (!) 150/68       NPO Status: Time of last liquid consumption:                         Time of last solid consumption:                         Date of last liquid consumption: 10/02/22                        Date of last solid food consumption: 10/02/22    BMI:   Wt Readings from Last 3 Encounters:   10/03/22 (!) 405 lb (183.7 kg)   22 (!) 405 lb (183.7 kg)   22 (!) 380 lb (172.4 kg)     Body mass index is 63.43 kg/m².     CBC:   Lab Results   Component Value Date/Time    WBC 13.8 2022 03:47 PM    RBC 3.63 2022 03:47 PM    HGB 9.0 2022 03:47 PM    HCT 29.8 2022 03:47 PM    MCV 82.1 2022 03:47 PM    RDW 15.2 2022 03:47 PM     2022 03:47 PM CMP:   Lab Results   Component Value Date/Time     09/19/2022 03:47 PM    K 3.8 09/19/2022 03:47 PM     09/19/2022 03:47 PM    CO2 19 09/19/2022 03:47 PM    BUN 20 09/19/2022 03:47 PM    CREATININE 1.02 09/19/2022 03:47 PM    GFRAA >60 09/19/2022 03:47 PM    LABGLOM >60 09/19/2022 03:47 PM    GLUCOSE 128 09/19/2022 03:47 PM    PROT 7.6 10/01/2021 10:53 PM    CALCIUM 9.3 09/19/2022 03:47 PM    BILITOT 0.29 10/01/2021 10:53 PM    ALKPHOS 100 10/01/2021 10:53 PM    AST 21 10/01/2021 10:53 PM    ALT 22 10/01/2021 10:53 PM       POC Tests:   Recent Labs     10/03/22  1013   POCGLU 127*       Coags:   Lab Results   Component Value Date/Time    PROTIME 10.1 07/12/2015 04:37 PM    INR 0.9 07/12/2015 04:37 PM    APTT 24.1 07/12/2015 04:37 PM       HCG (If Applicable): No results found for: PREGTESTUR, PREGSERUM, HCG, HCGQUANT     ABGs: No results found for: PHART, PO2ART, JNJ0WHM, SSU6MEJ, BEART, S3YBHIIU     Type & Screen (If Applicable):  No results found for: LABABO, LABRH    Drug/Infectious Status (If Applicable):  No results found for: HIV, HEPCAB    COVID-19 Screening (If Applicable):   Lab Results   Component Value Date/Time    COVID19 Not Detected 10/01/2021 10:36 PM           Anesthesia Evaluation    Airway: Mallampati: I  TM distance: >3 FB   Neck ROM: full  Mouth opening: > = 3 FB   Dental:          Pulmonary:   (+) sleep apnea:  asthma:     (-) shortness of breath                           Cardiovascular:                Echocardiogram reviewed  Stress test reviewed                Neuro/Psych:               GI/Hepatic/Renal:             Endo/Other:    (+) Diabetes, . Abdominal:             Vascular:           Other Findings:           Anesthesia Plan      general     ASA 4                                   Ann Patrick MD   10/3/2022

## 2022-10-03 NOTE — DISCHARGE INSTRUCTIONS
-My office will contact you in the next 2-3 business days to schedule a follow-up appointment. If you do not hear from my office in a timely manner please feel free to call us at [phone]. Follow-up should be scheduled for 2 weeks. -Regular Diet.  -Resume all home medications. HOLD ASPIRIN FOR 3 DAYS FOLLOWING YOUR PROCEDURE. -Do not operative heavy machinery if you are taking Percocet (oxycodone), Norco/Vicodin (hydrocodone), Tylenol #3 (codeine), or Ultram (tramadol). These medications may affect your reaction time and impair your decision making abilities. Additionally, these medications are quite constipating. To combat this you will be prescribed a medication called Colace (docusate sodium). We recommend that you take this stool softener twice daily.  -At this time it is ok for you to resume showering. If you have wounds, gently pat them dry when toweling off. Additionally, you should avoid from submerging any wounds under water.  -Walking is important and encourage. Although you may experience pain after leaving us, frequent short walks are important.  -Please no lifting more than 10 lbs until your follow-up with me in clinic.       Best,  Dr. Qasim Barnett MD
n/a

## 2022-10-03 NOTE — ANESTHESIA POSTPROCEDURE EVALUATION
Department of Anesthesiology  Postprocedure Note    Patient: Jacqueline Sierra  MRN: [id number]  YOB: 1970  Date of evaluation: 10/3/2022      Procedure Summary     Date: 10/03/22 Room / Location: [address] [place] [address] / [facility] - INPATIENT    Anesthesia Start: 1214 Anesthesia Stop: 0813    Procedure: STAGE 2 2050 [facility] (Urethra) Diagnosis:       Stricture of male urethra, unspecified stricture type      (Stricture of male urethra, unspecified stricture type [N35.919])    Surgeons: Jordan Kitchen MD Responsible Provider: Hugo Colón MD    Anesthesia Type: general ASA Status: 4          Anesthesia Type: No value filed.     David Phase I: David Score: 8    David Phase II:        Anesthesia Post Evaluation    Complications: no

## 2022-10-04 LAB — GLUCOSE BLD-MCNC: 109 MG/DL (ref 75–110)

## 2022-11-04 ENCOUNTER — HOSPITAL ENCOUNTER (OUTPATIENT)
Age: 52
Discharge: HOME | End: 2022-11-04
Payer: COMMERCIAL

## 2022-11-04 LAB
T3 FREE: 2.68 PG/ML (ref 2.02–4.43)
THYROXINE, FREE: 0.91 NG/DL (ref 0.93–1.7)
TSH SERPL DL<=0.05 MIU/L-ACNC: 2.32 UIU/ML (ref 0.3–5)

## 2022-11-04 PROCEDURE — 84443 ASSAY THYROID STIM HORMONE: CPT

## 2022-11-04 PROCEDURE — 36415 COLL VENOUS BLD VENIPUNCTURE: CPT

## 2022-11-04 PROCEDURE — 84439 ASSAY OF FREE THYROXINE: CPT

## 2022-11-04 PROCEDURE — 84481 FREE ASSAY (FT-3): CPT

## 2023-03-08 ENCOUNTER — HOSPITAL ENCOUNTER (OUTPATIENT)
Age: 53
Discharge: HOME | End: 2023-03-08
Payer: COMMERCIAL

## 2023-03-08 LAB
ALT SERPL-CCNC: 27 U/L (ref 5–41)
AST SERPL-CCNC: 28 U/L
CHOLEST SERPL-MCNC: 140 MG/DL
CHOLESTEROL/HDL RATIO: 3.1
HDLC SERPL-MCNC: 45 MG/DL
LDLC SERPL CALC-MCNC: 62 MG/DL (ref 0–130)
TRIGL SERPL-MCNC: 164 MG/DL

## 2023-03-08 PROCEDURE — 84450 TRANSFERASE (AST) (SGOT): CPT

## 2023-03-08 PROCEDURE — 84460 ALANINE AMINO (ALT) (SGPT): CPT

## 2023-03-08 PROCEDURE — 36415 COLL VENOUS BLD VENIPUNCTURE: CPT

## 2023-03-08 PROCEDURE — 80061 LIPID PANEL: CPT

## 2023-05-07 SDOH — HEALTH STABILITY: PHYSICAL HEALTH: ON AVERAGE, HOW MANY DAYS PER WEEK DO YOU ENGAGE IN MODERATE TO STRENUOUS EXERCISE (LIKE A BRISK WALK)?: 2 DAYS

## 2023-05-07 SDOH — HEALTH STABILITY: PHYSICAL HEALTH: ON AVERAGE, HOW MANY MINUTES DO YOU ENGAGE IN EXERCISE AT THIS LEVEL?: 30 MIN

## 2023-05-07 ASSESSMENT — SOCIAL DETERMINANTS OF HEALTH (SDOH)
WITHIN THE LAST YEAR, HAVE TO BEEN RAPED OR FORCED TO HAVE ANY KIND OF SEXUAL ACTIVITY BY YOUR PARTNER OR EX-PARTNER?: NO
WITHIN THE LAST YEAR, HAVE YOU BEEN HUMILIATED OR EMOTIONALLY ABUSED IN OTHER WAYS BY YOUR PARTNER OR EX-PARTNER?: NO
WITHIN THE LAST YEAR, HAVE YOU BEEN KICKED, HIT, SLAPPED, OR OTHERWISE PHYSICALLY HURT BY YOUR PARTNER OR EX-PARTNER?: NO
WITHIN THE LAST YEAR, HAVE YOU BEEN AFRAID OF YOUR PARTNER OR EX-PARTNER?: NO

## 2023-05-09 ASSESSMENT — ENCOUNTER SYMPTOMS
CHEST TIGHTNESS: 0
SORE THROAT: 0
CONSTIPATION: 0
RHINORRHEA: 0
BACK PAIN: 0
SHORTNESS OF BREATH: 0
COUGH: 0
ABDOMINAL DISTENTION: 0
DIARRHEA: 0
NAUSEA: 0
ABDOMINAL PAIN: 0
VOMITING: 0

## 2023-05-09 NOTE — PROGRESS NOTES
Anthony Cousin, APRN-CNP  [facility]  [address] [address], [address]  [address] [address]  Dept: [phone]  Dept Fax: [phone]    Patient ID: Nidia Rivas is a 47 y.o. male. HPI:  Nidia Rivas is a 47 y.o. male who presents to the office today for a first visit and to establish a relationship with a new primary care provider. Today, the patient complains of intermittent numbness and tingling to his right arm. He relates that it has been ongoing for a couple of weeks. He also complains of an ongoing headache. He relates that he has been getting headaches for about a month. He does report recently starting back on Ozempic 2 mg weekly. He has been taking Tylenol with improvement of his headaches. He also complains of an abdominal hernia. He has noticed it for about the last 6 months. Pt denies any fever or chills. Pt today denies any HA, chest pain, or SOB. Pt denies any N/V/D/C or abdominal pain. Patient does report a history of HTN (on on Losartan 25 mg daily), Afib (on Xarelto 20 mg daily; follows with [facility]), HLD (on Zocor 40 mg daily & Fish Oil 1000 mg daily), DM (on Metformin 1000 mg BID, Ozempic 2 mg weekly & Lantus 40 units nightly; was following with Endocrinology at the [place]), GERD (on Omeprazole 40 mg daily), CHELSEA (on CPAP), Asthma (on Albuterol PRN), fatty liver and anxiety (on Wellbutrin 300 mg daily). Patient reports compliance with all medications and is tolerating them without side effects. Previous office notes, labs and diagnostic studies were reviewed prior to and during encounter. The patient's past medical, surgical, social, and family history as well as her current medications and allergies were reviewed as documented in today's encounter by JAMES Ontiveros.       Current Outpatient Medications on File Prior to Visit   Medication Sig Dispense Refill    XARELTO 20 MG TABS tablet Take 1 tablet by mouth daily

## 2023-05-10 ENCOUNTER — OFFICE VISIT (OUTPATIENT)
Dept: FAMILY MEDICINE CLINIC | Age: 53
End: 2023-05-10
Payer: COMMERCIAL

## 2023-05-10 VITALS
OXYGEN SATURATION: 98 % | HEIGHT: 67 IN | WEIGHT: 315 LBS | TEMPERATURE: 98.3 F | DIASTOLIC BLOOD PRESSURE: 84 MMHG | HEART RATE: 70 BPM | BODY MASS INDEX: 49.44 KG/M2 | SYSTOLIC BLOOD PRESSURE: 130 MMHG

## 2023-05-10 DIAGNOSIS — K46.9 NON-RECURRENT ABDOMINAL HERNIA WITHOUT OBSTRUCTION OR GANGRENE, UNSPECIFIED HERNIA TYPE: ICD-10-CM

## 2023-05-10 DIAGNOSIS — Z76.89 ENCOUNTER TO ESTABLISH CARE: ICD-10-CM

## 2023-05-10 DIAGNOSIS — M77.01 MEDIAL EPICONDYLITIS OF RIGHT ELBOW: ICD-10-CM

## 2023-05-10 DIAGNOSIS — E11.9 TYPE 2 DIABETES MELLITUS WITHOUT COMPLICATION, WITHOUT LONG-TERM CURRENT USE OF INSULIN (HCC): ICD-10-CM

## 2023-05-10 DIAGNOSIS — E78.2 MIXED HYPERLIPIDEMIA: ICD-10-CM

## 2023-05-10 DIAGNOSIS — Z12.5 PROSTATE CANCER SCREENING: ICD-10-CM

## 2023-05-10 DIAGNOSIS — F41.9 ANXIETY: ICD-10-CM

## 2023-05-10 DIAGNOSIS — J45.20 MILD INTERMITTENT ASTHMA WITHOUT COMPLICATION: ICD-10-CM

## 2023-05-10 DIAGNOSIS — K76.0 NONALCOHOLIC HEPATOSTEATOSIS: ICD-10-CM

## 2023-05-10 DIAGNOSIS — K21.9 GASTROESOPHAGEAL REFLUX DISEASE, UNSPECIFIED WHETHER ESOPHAGITIS PRESENT: ICD-10-CM

## 2023-05-10 DIAGNOSIS — I10 ESSENTIAL HYPERTENSION: Primary | ICD-10-CM

## 2023-05-10 DIAGNOSIS — G47.33 OBSTRUCTIVE SLEEP APNEA SYNDROME: ICD-10-CM

## 2023-05-10 DIAGNOSIS — I48.91 ATRIAL FIBRILLATION, UNSPECIFIED TYPE (HCC): ICD-10-CM

## 2023-05-10 DIAGNOSIS — Z00.00 PREVENTATIVE HEALTH CARE: ICD-10-CM

## 2023-05-10 DIAGNOSIS — D72.829 LEUKOCYTOSIS, UNSPECIFIED TYPE: ICD-10-CM

## 2023-05-10 DIAGNOSIS — D64.9 ANEMIA, UNSPECIFIED TYPE: ICD-10-CM

## 2023-05-10 PROCEDURE — G8417 CALC BMI ABV UP PARAM F/U: HCPCS | Performed by: NURSE PRACTITIONER

## 2023-05-10 PROCEDURE — 1036F TOBACCO NON-USER: CPT | Performed by: NURSE PRACTITIONER

## 2023-05-10 PROCEDURE — 3075F SYST BP GE 130 - 139MM HG: CPT | Performed by: NURSE PRACTITIONER

## 2023-05-10 PROCEDURE — 3079F DIAST BP 80-89 MM HG: CPT | Performed by: NURSE PRACTITIONER

## 2023-05-10 PROCEDURE — G8427 DOCREV CUR MEDS BY ELIG CLIN: HCPCS | Performed by: NURSE PRACTITIONER

## 2023-05-10 PROCEDURE — 3017F COLORECTAL CA SCREEN DOC REV: CPT | Performed by: NURSE PRACTITIONER

## 2023-05-10 PROCEDURE — 2022F DILAT RTA XM EVC RTNOPTHY: CPT | Performed by: NURSE PRACTITIONER

## 2023-05-10 PROCEDURE — 3046F HEMOGLOBIN A1C LEVEL >9.0%: CPT | Performed by: NURSE PRACTITIONER

## 2023-05-10 RX ORDER — CYCLOBENZAPRINE HCL 10 MG
10 TABLET ORAL 3 TIMES DAILY PRN
Qty: 21 TABLET | Refills: 0 | Status: SHIPPED | OUTPATIENT
Start: 2023-05-10 | End: 2023-05-20

## 2023-05-10 RX ORDER — RIVAROXABAN 20 MG/1
20 TABLET, FILM COATED ORAL DAILY
COMMUNITY
Start: 2023-04-12

## 2023-05-10 RX ORDER — PREDNISONE 20 MG/1
TABLET ORAL
Qty: 17 TABLET | Refills: 0 | Status: SHIPPED | OUTPATIENT
Start: 2023-05-10 | End: 2023-05-20

## 2023-05-10 SDOH — ECONOMIC STABILITY: INCOME INSECURITY: HOW HARD IS IT FOR YOU TO PAY FOR THE VERY BASICS LIKE FOOD, HOUSING, MEDICAL CARE, AND HEATING?: NOT HARD AT ALL

## 2023-05-10 SDOH — ECONOMIC STABILITY: HOUSING INSECURITY
IN THE LAST 12 MONTHS, WAS THERE A TIME WHEN YOU DID NOT HAVE A STEADY PLACE TO SLEEP OR SLEPT IN A SHELTER (INCLUDING NOW)?: NO

## 2023-05-10 SDOH — ECONOMIC STABILITY: FOOD INSECURITY: WITHIN THE PAST 12 MONTHS, YOU WORRIED THAT YOUR FOOD WOULD RUN OUT BEFORE YOU GOT MONEY TO BUY MORE.: OFTEN TRUE

## 2023-05-10 SDOH — ECONOMIC STABILITY: FOOD INSECURITY: WITHIN THE PAST 12 MONTHS, THE FOOD YOU BOUGHT JUST DIDN'T LAST AND YOU DIDN'T HAVE MONEY TO GET MORE.: OFTEN TRUE

## 2023-05-10 ASSESSMENT — PATIENT HEALTH QUESTIONNAIRE - PHQ9
SUM OF ALL RESPONSES TO PHQ QUESTIONS 1-9: 2
2. FEELING DOWN, DEPRESSED OR HOPELESS: 1
1. LITTLE INTEREST OR PLEASURE IN DOING THINGS: 1
SUM OF ALL RESPONSES TO PHQ QUESTIONS 1-9: 2
SUM OF ALL RESPONSES TO PHQ QUESTIONS 1-9: 2
SUM OF ALL RESPONSES TO PHQ9 QUESTIONS 1 & 2: 2
SUM OF ALL RESPONSES TO PHQ QUESTIONS 1-9: 2

## 2023-05-10 ASSESSMENT — ENCOUNTER SYMPTOMS: SINUS PAIN: 0

## 2023-05-10 NOTE — PATIENT INSTRUCTIONS
stretch    Sit or stand tall and glide your head backward as in the dorsal glide stretch. Raise both arms so that your hands are next to your ears. Take a deep breath, and as you breathe out, lower your elbows down and behind your back. You will feel your shoulder blades slide down and together, and at the same time you will feel a stretch across your chest and the front of your shoulders. Hold for about 6 seconds, and then relax for up to 10 seconds. Repeat 8 to 12 times. Strengthening: Hands on head    Move your head backward, forward, and side to side against gentle pressure from your hands, holding each position for about 6 seconds. Repeat 8 to 12 times. Follow-up care is a key part of your treatment and safety. Be sure to make and go to all appointments, and call your doctor if you are having problems. It's also a good idea to know your test results and keep a list of the medicines you take. Current as of: November 9, 2022               Content Version: 13.6  © 2006-2023 [employer]. Care instructions adapted under license by [facility] ([facility]). If you have questions about a medical condition or this instruction, always ask your healthcare professional. [place] [address] any warranty or liability for your use of this information.

## 2023-05-11 ENCOUNTER — HOSPITAL ENCOUNTER (OUTPATIENT)
Dept: ULTRASOUND IMAGING | Age: 53
Discharge: HOME | End: 2023-05-13
Payer: COMMERCIAL

## 2023-05-11 ENCOUNTER — HOSPITAL ENCOUNTER (OUTPATIENT)
Age: 53
Discharge: HOME | End: 2023-05-11
Payer: COMMERCIAL

## 2023-05-11 DIAGNOSIS — I10 ESSENTIAL HYPERTENSION: ICD-10-CM

## 2023-05-11 DIAGNOSIS — Z12.5 PROSTATE CANCER SCREENING: ICD-10-CM

## 2023-05-11 DIAGNOSIS — E11.9 TYPE 2 DIABETES MELLITUS WITHOUT COMPLICATION, WITHOUT LONG-TERM CURRENT USE OF INSULIN (HCC): ICD-10-CM

## 2023-05-11 DIAGNOSIS — E78.2 MIXED HYPERLIPIDEMIA: ICD-10-CM

## 2023-05-11 DIAGNOSIS — K46.9 NON-RECURRENT ABDOMINAL HERNIA WITHOUT OBSTRUCTION OR GANGRENE, UNSPECIFIED HERNIA TYPE: ICD-10-CM

## 2023-05-11 LAB
ALBUMIN SERPL-MCNC: 4 G/DL (ref 3.5–5.2)
ALP SERPL-CCNC: 99 U/L (ref 40–129)
ALT SERPL-CCNC: 25 U/L (ref 5–41)
ANION GAP SERPL CALCULATED.3IONS-SCNC: 14 MMOL/L (ref 9–17)
AST SERPL-CCNC: 22 U/L
BILIRUB SERPL-MCNC: 0.2 MG/DL (ref 0.3–1.2)
BUN SERPL-MCNC: 19 MG/DL (ref 6–20)
CALCIUM SERPL-MCNC: 9.3 MG/DL (ref 8.6–10.4)
CHLORIDE SERPL-SCNC: 108 MMOL/L (ref 98–107)
CHOLEST SERPL-MCNC: 125 MG/DL
CHOLESTEROL/HDL RATIO: 3
CO2 SERPL-SCNC: 18 MMOL/L (ref 20–31)
CREAT SERPL-MCNC: 1.17 MG/DL (ref 0.7–1.2)
EST. AVERAGE GLUCOSE BLD GHB EST-MCNC: 146 MG/DL
GFR SERPL CREATININE-BSD FRML MDRD: >60 ML/MIN/1.73M2
GLUCOSE SERPL-MCNC: 116 MG/DL (ref 70–99)
HBA1C MFR BLD: 6.7 % (ref 4–6)
HCT VFR BLD AUTO: 30.3 % (ref 41–53)
HDLC SERPL-MCNC: 41 MG/DL
HGB BLD-MCNC: 9.8 G/DL (ref 13.5–17.5)
LDLC SERPL CALC-MCNC: 44 MG/DL (ref 0–130)
MCH RBC QN AUTO: 24.8 PG (ref 26–34)
MCHC RBC AUTO-ENTMCNC: 32.2 G/DL (ref 31–37)
MCV RBC AUTO: 77 FL (ref 80–100)
PDW BLD-RTO: 15.7 % (ref 11.5–14.9)
PLATELET # BLD AUTO: 380 K/UL (ref 150–450)
PMV BLD AUTO: 7.4 FL (ref 6–12)
POTASSIUM SERPL-SCNC: 4.4 MMOL/L (ref 3.7–5.3)
PROSTATE SPECIFIC ANTIGEN: <0.02 NG/ML
PROT SERPL-MCNC: 8 G/DL (ref 6.4–8.3)
RBC # BLD: 3.93 M/UL (ref 4.5–5.9)
SODIUM SERPL-SCNC: 140 MMOL/L (ref 135–144)
TRIGL SERPL-MCNC: 200 MG/DL
WBC # BLD AUTO: 14.7 K/UL (ref 3.5–11)

## 2023-05-11 PROCEDURE — 83036 HEMOGLOBIN GLYCOSYLATED A1C: CPT

## 2023-05-11 PROCEDURE — 80061 LIPID PANEL: CPT

## 2023-05-11 PROCEDURE — 36415 COLL VENOUS BLD VENIPUNCTURE: CPT

## 2023-05-11 PROCEDURE — G0103 PSA SCREENING: HCPCS

## 2023-05-11 PROCEDURE — 80053 COMPREHEN METABOLIC PANEL: CPT

## 2023-05-11 PROCEDURE — 76705 ECHO EXAM OF ABDOMEN: CPT

## 2023-05-11 PROCEDURE — 85027 COMPLETE CBC AUTOMATED: CPT

## 2023-06-05 ENCOUNTER — HOSPITAL ENCOUNTER (OUTPATIENT)
Age: 53
Setting detail: SPECIMEN
Discharge: HOME | End: 2023-06-05

## 2023-06-05 DIAGNOSIS — D64.9 ANEMIA, UNSPECIFIED TYPE: ICD-10-CM

## 2023-06-05 DIAGNOSIS — D72.829 LEUKOCYTOSIS, UNSPECIFIED TYPE: ICD-10-CM

## 2023-06-05 LAB
BASOPHILS # BLD: 0.05 K/UL (ref 0–0.2)
BASOPHILS NFR BLD: 0 % (ref 0–2)
EOSINOPHIL # BLD: 0.33 K/UL (ref 0–0.44)
EOSINOPHILS RELATIVE PERCENT: 3 % (ref 1–4)
ERYTHROCYTE [DISTWIDTH] IN BLOOD BY AUTOMATED COUNT: 15.4 % (ref 11.8–14.4)
FERRITIN SERPL-MCNC: 43 NG/ML (ref 30–400)
FOLATE SERPL-MCNC: >20 NG/ML
HCT VFR BLD AUTO: 30.1 % (ref 40.7–50.3)
HGB BLD-MCNC: 9.1 G/DL (ref 13–17)
IMM GRANULOCYTES # BLD AUTO: 0.04 K/UL (ref 0–0.3)
IMM GRANULOCYTES NFR BLD: 0 %
IMM RETICS NFR: 15.3 % (ref 2.7–18.3)
IRON SATN MFR SERPL: 13 % (ref 20–55)
IRON SERPL-MCNC: 42 UG/DL (ref 59–158)
LYMPHOCYTES # BLD: 30 % (ref 24–43)
LYMPHOCYTES NFR BLD: 3.5 K/UL (ref 1.1–3.7)
MCH RBC QN AUTO: 25.1 PG (ref 25.2–33.5)
MCHC RBC AUTO-ENTMCNC: 30.2 G/DL (ref 28.4–34.8)
MCV RBC AUTO: 83.1 FL (ref 82.6–102.9)
MONOCYTES NFR BLD: 0.9 K/UL (ref 0.1–1.2)
MONOCYTES NFR BLD: 8 % (ref 3–12)
NEUTROPHILS NFR BLD: 59 % (ref 36–65)
NEUTS SEG NFR BLD: 6.77 K/UL (ref 1.5–8.1)
NRBC AUTOMATED: 0 PER 100 WBC
PLATELET # BLD AUTO: 329 K/UL (ref 138–453)
PMV BLD AUTO: 10.4 FL (ref 8.1–13.5)
RBC # BLD AUTO: 3.62 M/UL (ref 4.21–5.77)
RBC # BLD: NORMAL 10*6/UL
RETIC HEMOGLOBIN: 30.5 PG (ref 28.2–35.7)
RETICS # AUTO: 0.04 M/UL (ref 0.03–0.08)
RETICS/RBC NFR AUTO: 1 % (ref 0.5–1.9)
TIBC SERPL-MCNC: 317 UG/DL (ref 250–450)
UNSATURATED IRON BINDING CAPACITY: 275 UG/DL (ref 112–347)
VIT B12 SERPL-MCNC: 768 PG/ML (ref 232–1245)
WBC OTHER # BLD: 11.6 K/UL (ref 3.5–11.3)

## 2023-06-06 LAB
PATH REV BLD -IMP: NORMAL
SURGICAL PATHOLOGY REPORT: NORMAL

## 2023-06-12 PROBLEM — Z79.899 ON STATIN THERAPY: Status: ACTIVE | Noted: 2023-06-12

## 2023-07-10 ENCOUNTER — HOSPITAL ENCOUNTER (OUTPATIENT)
Dept: CT IMAGING | Age: 53
Discharge: HOME | End: 2023-07-12
Attending: HOSPITALIST
Payer: COMMERCIAL

## 2023-07-10 DIAGNOSIS — K90.9 INTESTINAL MALABSORPTION, UNSPECIFIED TYPE: ICD-10-CM

## 2023-07-10 DIAGNOSIS — D50.9 IRON DEFICIENCY ANEMIA, UNSPECIFIED IRON DEFICIENCY ANEMIA TYPE: ICD-10-CM

## 2023-07-10 DIAGNOSIS — R59.0 LOCALIZED ENLARGED LYMPH NODES: ICD-10-CM

## 2023-07-10 DIAGNOSIS — R10.9 ABDOMINAL PAIN, UNSPECIFIED ABDOMINAL LOCATION: ICD-10-CM

## 2023-07-10 LAB
EGFR, POC: >60 ML/MIN/1.73M2
POC CREATININE: 1.28 MG/DL (ref 0.51–1.19)

## 2023-07-10 PROCEDURE — 2500000003 HC RX 250 WO HCPCS: Performed by: HOSPITALIST

## 2023-07-10 PROCEDURE — 71260 CT THORAX DX C+: CPT

## 2023-07-10 PROCEDURE — 70491 CT SOFT TISSUE NECK W/DYE: CPT

## 2023-07-10 PROCEDURE — 2580000003 HC RX 258: Performed by: HOSPITALIST

## 2023-07-10 PROCEDURE — 82565 ASSAY OF CREATININE: CPT

## 2023-07-10 RX ORDER — 0.9 % SODIUM CHLORIDE 0.9 %
200 INTRAVENOUS SOLUTION INTRAVENOUS ONCE
Status: COMPLETED | OUTPATIENT
Start: 2023-07-10 | End: 2023-07-10

## 2023-07-10 RX ORDER — SODIUM CHLORIDE 0.9 % (FLUSH) 0.9 %
10 SYRINGE (ML) INJECTION PRN
Status: DISCONTINUED | OUTPATIENT
Start: 2023-07-10 | End: 2023-07-13 | Stop reason: HOSPADM

## 2023-07-10 RX ADMIN — BARIUM SULFATE 450 ML: 20 SUSPENSION ORAL at 15:16

## 2023-07-10 RX ADMIN — SODIUM CHLORIDE, PRESERVATIVE FREE 10 ML: 5 INJECTION INTRAVENOUS at 15:14

## 2023-07-10 RX ADMIN — SODIUM CHLORIDE 200 ML: 9 INJECTION, SOLUTION INTRAVENOUS at 15:13

## 2023-07-31 ENCOUNTER — HOSPITAL ENCOUNTER (OUTPATIENT)
Dept: PREADMISSION TESTING | Age: 53
Discharge: HOME | End: 2023-08-04

## 2023-07-31 VITALS — HEIGHT: 67 IN | BODY MASS INDEX: 49.44 KG/M2 | WEIGHT: 315 LBS

## 2023-08-02 ENCOUNTER — HOSPITAL ENCOUNTER (OUTPATIENT)
Age: 53
Discharge: HOME | End: 2023-08-02
Payer: COMMERCIAL

## 2023-08-02 DIAGNOSIS — R30.0 DYSURIA: ICD-10-CM

## 2023-08-02 LAB
BACTERIA URNS QL MICRO: ABNORMAL
BASOPHILS # BLD: 0.04 K/UL (ref 0–0.2)
BASOPHILS NFR BLD: 0 % (ref 0–2)
BILIRUB UR QL STRIP: NEGATIVE
CASTS #/AREA URNS LPF: ABNORMAL /LPF (ref 0–8)
CLARITY UR: ABNORMAL
COLOR UR: YELLOW
EOSINOPHIL # BLD: 0.2 K/UL (ref 0–0.44)
EOSINOPHILS RELATIVE PERCENT: 1 % (ref 1–4)
EPI CELLS #/AREA URNS HPF: ABNORMAL /HPF (ref 0–5)
ERYTHROCYTE [DISTWIDTH] IN BLOOD BY AUTOMATED COUNT: 17.4 % (ref 11.8–14.4)
FERRITIN SERPL-MCNC: 420 NG/ML (ref 30–400)
GLUCOSE UR STRIP-MCNC: NEGATIVE MG/DL
HCT VFR BLD AUTO: 33.3 % (ref 40.7–50.3)
HGB BLD-MCNC: 10.2 G/DL (ref 13–17)
HGB UR QL STRIP.AUTO: ABNORMAL
IMM GRANULOCYTES # BLD AUTO: 0.08 K/UL (ref 0–0.3)
IMM GRANULOCYTES NFR BLD: 1 %
IRON SATN MFR SERPL: 23 % (ref 20–55)
IRON SERPL-MCNC: 52 UG/DL (ref 59–158)
KETONES UR STRIP-MCNC: NEGATIVE MG/DL
LEUKOCYTE ESTERASE UR QL STRIP: ABNORMAL
LYMPHOCYTES NFR BLD: 4.17 K/UL (ref 1.1–3.7)
LYMPHOCYTES RELATIVE PERCENT: 28 % (ref 24–43)
MCH RBC QN AUTO: 26.4 PG (ref 25.2–33.5)
MCHC RBC AUTO-ENTMCNC: 30.6 G/DL (ref 28.4–34.8)
MCV RBC AUTO: 86.3 FL (ref 82.6–102.9)
MONOCYTES NFR BLD: 1.42 K/UL (ref 0.1–1.2)
MONOCYTES NFR BLD: 9 % (ref 3–12)
NEUTROPHILS NFR BLD: 61 % (ref 36–65)
NEUTS SEG NFR BLD: 9.28 K/UL (ref 1.5–8.1)
NITRITE UR QL STRIP: NEGATIVE
NRBC BLD-RTO: 0 PER 100 WBC
PH UR STRIP: 5.5 [PH] (ref 5–8)
PLATELET # BLD AUTO: 346 K/UL (ref 138–453)
PMV BLD AUTO: 9.9 FL (ref 8.1–13.5)
PROT UR STRIP-MCNC: ABNORMAL MG/DL
RBC # BLD AUTO: 3.86 M/UL (ref 4.21–5.77)
RBC # BLD: ABNORMAL 10*6/UL
RBC #/AREA URNS HPF: ABNORMAL /HPF (ref 0–4)
SP GR UR STRIP: 1.02 (ref 1–1.03)
TIBC SERPL-MCNC: 230 UG/DL (ref 250–450)
UNSATURATED IRON BINDING CAPACITY: 178 UG/DL (ref 112–347)
UROBILINOGEN UR STRIP-ACNC: NORMAL EU/DL (ref 0–1)
WBC #/AREA URNS HPF: ABNORMAL /HPF (ref 0–5)
WBC OTHER # BLD: 15.2 K/UL (ref 3.5–11.3)

## 2023-08-02 PROCEDURE — 81001 URINALYSIS AUTO W/SCOPE: CPT

## 2023-08-02 PROCEDURE — 87186 SC STD MICRODIL/AGAR DIL: CPT

## 2023-08-02 PROCEDURE — 36415 COLL VENOUS BLD VENIPUNCTURE: CPT

## 2023-08-02 PROCEDURE — 87086 URINE CULTURE/COLONY COUNT: CPT

## 2023-08-02 PROCEDURE — 83540 ASSAY OF IRON: CPT

## 2023-08-02 PROCEDURE — 85025 COMPLETE CBC W/AUTO DIFF WBC: CPT

## 2023-08-02 PROCEDURE — 87077 CULTURE AEROBIC IDENTIFY: CPT

## 2023-08-02 PROCEDURE — 83550 IRON BINDING TEST: CPT

## 2023-08-02 PROCEDURE — 82728 ASSAY OF FERRITIN: CPT

## 2023-08-03 ENCOUNTER — OFFICE VISIT (OUTPATIENT)
Dept: FAMILY MEDICINE CLINIC | Age: 53
End: 2023-08-03
Payer: COMMERCIAL

## 2023-08-03 VITALS
DIASTOLIC BLOOD PRESSURE: 84 MMHG | TEMPERATURE: 98 F | OXYGEN SATURATION: 98 % | WEIGHT: 315 LBS | HEART RATE: 74 BPM | BODY MASS INDEX: 65.31 KG/M2 | SYSTOLIC BLOOD PRESSURE: 128 MMHG

## 2023-08-03 DIAGNOSIS — Z01.818 PREOPERATIVE CLEARANCE: Primary | ICD-10-CM

## 2023-08-03 PROCEDURE — 3074F SYST BP LT 130 MM HG: CPT | Performed by: NURSE PRACTITIONER

## 2023-08-03 PROCEDURE — 3079F DIAST BP 80-89 MM HG: CPT | Performed by: NURSE PRACTITIONER

## 2023-08-03 PROCEDURE — G8427 DOCREV CUR MEDS BY ELIG CLIN: HCPCS | Performed by: NURSE PRACTITIONER

## 2023-08-03 PROCEDURE — G8417 CALC BMI ABV UP PARAM F/U: HCPCS | Performed by: NURSE PRACTITIONER

## 2023-08-03 PROCEDURE — 99213 OFFICE O/P EST LOW 20 MIN: CPT | Performed by: NURSE PRACTITIONER

## 2023-08-03 RX ORDER — CIPROFLOXACIN 500 MG/1
500 TABLET, FILM COATED ORAL 2 TIMES DAILY
Qty: 14 TABLET | Refills: 0 | Status: SHIPPED | OUTPATIENT
Start: 2023-08-03 | End: 2023-08-10

## 2023-08-03 ASSESSMENT — ENCOUNTER SYMPTOMS
VOMITING: 0
ABDOMINAL DISTENTION: 0
BACK PAIN: 0
COUGH: 0
CONSTIPATION: 0
CHEST TIGHTNESS: 0
SORE THROAT: 0
SHORTNESS OF BREATH: 0
APNEA: 1
DIARRHEA: 0
ABDOMINAL PAIN: 0
NAUSEA: 0
RHINORRHEA: 0

## 2023-08-03 NOTE — PROGRESS NOTES
Flo Benoit, APRN-[facility]  [address] [address], [address] [place] [address]  Dept: [phone]  Dept Fax: [phone]    HPI:   James Valencia is a 47 y.o. male is an established patient who presents to the office today for a preoperative consultation at the request of surgeon Dr. Tova Jacques who plans on performing EGD/Colonoscopy on August 14. This consultation is requested for the specific conditions prompting preoperative evaluation (i.e. because of potential affect on operative risk): HTN, afib, DM. Planned anesthesia is General.  The patient has the following known anesthesia issues:  NONE   Patient has a bleeding risk of : no recent abnormal bleeding, currently on ASA. Patient does not have objection to receiving blood products if needed. He does follow with cardiology, Dr. Troy Smith. His last visit was in April (notes not available). My previous office notes, labs and diagnostic studies were reviewed prior to and during encounter. The patient's past medical, surgical, social, and family history as well as current medications and allergies were reviewed as documented in today's encounter by JAMES Riley.       Current Outpatient Medications on File Prior to Visit   Medication Sig Dispense Refill    losartan (COZAAR) 25 MG tablet Take 1 tablet by mouth daily      insulin glargine (LANTUS) 100 UNIT/ML injection vial Inject 40 Units into the skin nightly      Semaglutide (OZEMPIC, 1 MG/DOSE, SC) Inject 1 mg into the skin once a week On Sundays      cetirizine (ZYRTEC) 10 MG tablet Take 1 tablet by mouth daily      metFORMIN (GLUCOPHAGE) 1000 MG tablet Take 1 tablet by mouth 2 times daily (with meals)      diclofenac sodium (VOLTAREN) 1 % GEL Apply 2 g topically 4 times daily as needed      buPROPion (WELLBUTRIN XL) 300 MG extended release tablet TAKE 1 TABLET BY MOUTH  EVERY MORNING 90 tablet 1    spironolactone (ALDACTONE) 50 MG tablet

## 2023-08-06 LAB
MICROORGANISM SPEC CULT: ABNORMAL
SPECIMEN DESCRIPTION: ABNORMAL

## 2023-08-10 NOTE — PRE-PROCEDURE INSTRUCTIONS
No answer, left message ? Unable to leave message ? When were you told to arrive at hospital ?  0730    Do you have a  ?yes    Are you on any blood thinners ? yes            If yes when did you stop taking ? Sunday    Do you have your prep Rx filled and instruction ? yes    Nothing to eat the day before , only clear liquids. yes    Are you experiencing any covid symptoms ? no    Do you have any infections or rash we should be aware of ?no      Do you have the Hibiclens soap to use the night before and the morning of surgery ? Nothing to eat or drink after midnight, only a sip of water to take any medication instructed to take the night before. yes  Wear comfortable clothing, leave any valuables at home, remove any jewelry and body piercing .  yes

## 2023-08-11 ENCOUNTER — ANESTHESIA EVENT (OUTPATIENT)
Dept: ENDOSCOPY | Age: 53
End: 2023-08-11
Payer: COMMERCIAL

## 2023-08-11 NOTE — PROGRESS NOTES
Left voice mail message for Cristal at [address] [phone] office (per Toya Cazares at [facility] office, Niharika is working on clearance) to call back with update on clearance as patient's procedure is Monday 8/14/23.

## 2023-08-14 ENCOUNTER — HOSPITAL ENCOUNTER (OUTPATIENT)
Age: 53
Setting detail: OUTPATIENT SURGERY
Discharge: HOME | End: 2023-08-14
Attending: SURGERY | Admitting: SURGERY
Payer: COMMERCIAL

## 2023-08-14 ENCOUNTER — ANESTHESIA (OUTPATIENT)
Dept: ENDOSCOPY | Age: 53
End: 2023-08-14
Payer: COMMERCIAL

## 2023-08-14 VITALS
WEIGHT: 315 LBS | RESPIRATION RATE: 14 BRPM | OXYGEN SATURATION: 94 % | SYSTOLIC BLOOD PRESSURE: 114 MMHG | BODY MASS INDEX: 49.44 KG/M2 | DIASTOLIC BLOOD PRESSURE: 54 MMHG | TEMPERATURE: 97.1 F | HEART RATE: 69 BPM | HEIGHT: 67 IN

## 2023-08-14 DIAGNOSIS — R10.9 ABDOMINAL PAIN, UNSPECIFIED ABDOMINAL LOCATION: ICD-10-CM

## 2023-08-14 DIAGNOSIS — K21.9 CHRONIC GERD: ICD-10-CM

## 2023-08-14 LAB
GLUCOSE BLD-MCNC: 123 MG/DL (ref 75–110)
GLUCOSE BLD-MCNC: 135 MG/DL (ref 75–110)

## 2023-08-14 PROCEDURE — 3700000000 HC ANESTHESIA ATTENDED CARE: Performed by: SURGERY

## 2023-08-14 PROCEDURE — 3609012400 HC EGD TRANSORAL BIOPSY SINGLE/MULTIPLE: Performed by: SURGERY

## 2023-08-14 PROCEDURE — 3700000001 HC ADD 15 MINUTES (ANESTHESIA): Performed by: SURGERY

## 2023-08-14 PROCEDURE — 7100000001 HC PACU RECOVERY - ADDTL 15 MIN: Performed by: SURGERY

## 2023-08-14 PROCEDURE — 7100000011 HC PHASE II RECOVERY - ADDTL 15 MIN: Performed by: SURGERY

## 2023-08-14 PROCEDURE — 3609027000 HC COLONOSCOPY: Performed by: SURGERY

## 2023-08-14 PROCEDURE — 2500000003 HC RX 250 WO HCPCS: Performed by: ANESTHESIOLOGY

## 2023-08-14 PROCEDURE — 7100000030 HC ASPR PHASE II RECOVERY - FIRST 15 MIN: Performed by: SURGERY

## 2023-08-14 PROCEDURE — 7100000010 HC PHASE II RECOVERY - FIRST 15 MIN: Performed by: SURGERY

## 2023-08-14 PROCEDURE — 7100000000 HC PACU RECOVERY - FIRST 15 MIN: Performed by: SURGERY

## 2023-08-14 PROCEDURE — 2500000003 HC RX 250 WO HCPCS: Performed by: NURSE ANESTHETIST, CERTIFIED REGISTERED

## 2023-08-14 PROCEDURE — 88305 TISSUE EXAM BY PATHOLOGIST: CPT

## 2023-08-14 PROCEDURE — 7100000031 HC ASPR PHASE II RECOVERY - ADDTL 15 MIN: Performed by: SURGERY

## 2023-08-14 PROCEDURE — 82947 ASSAY GLUCOSE BLOOD QUANT: CPT

## 2023-08-14 PROCEDURE — 2709999900 HC NON-CHARGEABLE SUPPLY: Performed by: SURGERY

## 2023-08-14 PROCEDURE — 2580000003 HC RX 258: Performed by: ANESTHESIOLOGY

## 2023-08-14 PROCEDURE — 6360000002 HC RX W HCPCS: Performed by: NURSE ANESTHETIST, CERTIFIED REGISTERED

## 2023-08-14 RX ORDER — SODIUM CHLORIDE 0.9 % (FLUSH) 0.9 %
5-40 SYRINGE (ML) INJECTION EVERY 12 HOURS SCHEDULED
Status: DISCONTINUED | OUTPATIENT
Start: 2023-08-14 | End: 2023-08-14 | Stop reason: HOSPADM

## 2023-08-14 RX ORDER — LIDOCAINE HYDROCHLORIDE 20 MG/ML
15 SOLUTION OROPHARYNGEAL ONCE
Status: DISCONTINUED | OUTPATIENT
Start: 2023-08-14 | End: 2023-08-14 | Stop reason: HOSPADM

## 2023-08-14 RX ORDER — SODIUM CHLORIDE 9 MG/ML
INJECTION, SOLUTION INTRAVENOUS PRN
Status: DISCONTINUED | OUTPATIENT
Start: 2023-08-14 | End: 2023-08-14 | Stop reason: HOSPADM

## 2023-08-14 RX ORDER — LIDOCAINE HYDROCHLORIDE 10 MG/ML
1 INJECTION, SOLUTION EPIDURAL; INFILTRATION; INTRACAUDAL; PERINEURAL
Status: COMPLETED | OUTPATIENT
Start: 2023-08-14 | End: 2023-08-14

## 2023-08-14 RX ORDER — PROPOFOL 10 MG/ML
INJECTION, EMULSION INTRAVENOUS PRN
Status: DISCONTINUED | OUTPATIENT
Start: 2023-08-14 | End: 2023-08-14 | Stop reason: SDUPTHER

## 2023-08-14 RX ORDER — LIDOCAINE HYDROCHLORIDE 20 MG/ML
INJECTION, SOLUTION EPIDURAL; INFILTRATION; INTRACAUDAL; PERINEURAL PRN
Status: DISCONTINUED | OUTPATIENT
Start: 2023-08-14 | End: 2023-08-14 | Stop reason: SDUPTHER

## 2023-08-14 RX ORDER — FENTANYL CITRATE 50 UG/ML
INJECTION, SOLUTION INTRAMUSCULAR; INTRAVENOUS PRN
Status: DISCONTINUED | OUTPATIENT
Start: 2023-08-14 | End: 2023-08-14 | Stop reason: SDUPTHER

## 2023-08-14 RX ORDER — SODIUM CHLORIDE 9 MG/ML
INJECTION, SOLUTION INTRAVENOUS CONTINUOUS
Status: DISCONTINUED | OUTPATIENT
Start: 2023-08-14 | End: 2023-08-14 | Stop reason: HOSPADM

## 2023-08-14 RX ORDER — SUCCINYLCHOLINE/SOD CL,ISO/PF 100 MG/5ML
SYRINGE (ML) INTRAVENOUS PRN
Status: DISCONTINUED | OUTPATIENT
Start: 2023-08-14 | End: 2023-08-14 | Stop reason: SDUPTHER

## 2023-08-14 RX ORDER — MIDAZOLAM HYDROCHLORIDE 1 MG/ML
INJECTION INTRAMUSCULAR; INTRAVENOUS PRN
Status: DISCONTINUED | OUTPATIENT
Start: 2023-08-14 | End: 2023-08-14 | Stop reason: SDUPTHER

## 2023-08-14 RX ORDER — ONDANSETRON 2 MG/ML
INJECTION INTRAMUSCULAR; INTRAVENOUS PRN
Status: DISCONTINUED | OUTPATIENT
Start: 2023-08-14 | End: 2023-08-14 | Stop reason: SDUPTHER

## 2023-08-14 RX ORDER — ROCURONIUM BROMIDE 10 MG/ML
INJECTION, SOLUTION INTRAVENOUS PRN
Status: DISCONTINUED | OUTPATIENT
Start: 2023-08-14 | End: 2023-08-14 | Stop reason: SDUPTHER

## 2023-08-14 RX ORDER — SODIUM CHLORIDE 0.9 % (FLUSH) 0.9 %
5-40 SYRINGE (ML) INJECTION PRN
Status: DISCONTINUED | OUTPATIENT
Start: 2023-08-14 | End: 2023-08-14 | Stop reason: HOSPADM

## 2023-08-14 RX ORDER — DEXAMETHASONE SODIUM PHOSPHATE 4 MG/ML
INJECTION, SOLUTION INTRA-ARTICULAR; INTRALESIONAL; INTRAMUSCULAR; INTRAVENOUS; SOFT TISSUE PRN
Status: DISCONTINUED | OUTPATIENT
Start: 2023-08-14 | End: 2023-08-14 | Stop reason: SDUPTHER

## 2023-08-14 RX ADMIN — SODIUM CHLORIDE: 9 INJECTION, SOLUTION INTRAVENOUS at 09:28

## 2023-08-14 RX ADMIN — MIDAZOLAM 2 MG: 1 INJECTION INTRAMUSCULAR; INTRAVENOUS at 09:51

## 2023-08-14 RX ADMIN — FENTANYL CITRATE 25 MCG: 50 INJECTION, SOLUTION INTRAMUSCULAR; INTRAVENOUS at 09:51

## 2023-08-14 RX ADMIN — ROCURONIUM BROMIDE 10 MG: 10 INJECTION, SOLUTION INTRAVENOUS at 09:59

## 2023-08-14 RX ADMIN — Medication 180 MG: at 09:59

## 2023-08-14 RX ADMIN — ROCURONIUM BROMIDE 40 MG: 10 INJECTION, SOLUTION INTRAVENOUS at 10:07

## 2023-08-14 RX ADMIN — LIDOCAINE HYDROCHLORIDE 100 MG: 20 INJECTION, SOLUTION EPIDURAL; INFILTRATION; INTRACAUDAL; PERINEURAL at 09:59

## 2023-08-14 RX ADMIN — FENTANYL CITRATE 25 MCG: 50 INJECTION, SOLUTION INTRAMUSCULAR; INTRAVENOUS at 09:58

## 2023-08-14 RX ADMIN — PROPOFOL 300 MG: 10 INJECTION, EMULSION INTRAVENOUS at 09:59

## 2023-08-14 RX ADMIN — DEXAMETHASONE SODIUM PHOSPHATE 4 MG: 4 INJECTION, SOLUTION INTRAMUSCULAR; INTRAVENOUS at 10:05

## 2023-08-14 RX ADMIN — LIDOCAINE HYDROCHLORIDE 1 ML: 10 INJECTION, SOLUTION EPIDURAL; INFILTRATION; INTRACAUDAL; PERINEURAL at 09:28

## 2023-08-14 RX ADMIN — ONDANSETRON 4 MG: 2 INJECTION INTRAMUSCULAR; INTRAVENOUS at 10:48

## 2023-08-14 ASSESSMENT — PAIN DESCRIPTION - LOCATION
LOCATION: ABDOMEN

## 2023-08-14 ASSESSMENT — ENCOUNTER SYMPTOMS
ABDOMINAL PAIN: 1
DIARRHEA: 1
CONSTIPATION: 1
RESPIRATORY NEGATIVE: 1
EYES NEGATIVE: 1
VOMITING: 1
NAUSEA: 1
APNEA: 0
CHEST TIGHTNESS: 0
CHOKING: 0

## 2023-08-14 ASSESSMENT — PAIN SCALES - GENERAL
PAINLEVEL_OUTOF10: 2

## 2023-08-14 ASSESSMENT — PAIN DESCRIPTION - DESCRIPTORS
DESCRIPTORS: TENDER

## 2023-08-14 ASSESSMENT — PAIN - FUNCTIONAL ASSESSMENT: PAIN_FUNCTIONAL_ASSESSMENT: 0-10

## 2023-08-14 ASSESSMENT — PAIN DESCRIPTION - ORIENTATION
ORIENTATION: LEFT

## 2023-08-14 NOTE — ANESTHESIA PRE PROCEDURE
Department of Anesthesiology  Preprocedure Note       Name:  Dominique Thomas   Age:  47 y.o.  :  1970                                          MRN:  [id number]         Date:  2023      Surgeon: Amanda Barajas):  Shelby Garcia MD    Procedure: Procedure(s):  EGD BIOPSY  COLONOSCOPY DIAGNOSTIC    Medications prior to admission:   Prior to Admission medications    Medication Sig Start Date End Date Taking? Authorizing Provider   losartan (COZAAR) 25 MG tablet Take 1 tablet by mouth daily    Historical Provider, MD   insulin glargine (LANTUS) 100 UNIT/ML injection vial Inject 40 Units into the skin nightly    Historical Provider, MD   Semaglutide (OZEMPIC, 1 MG/DOSE, SC) Inject 1 mg into the skin once a week On Sundays    Historical Provider, MD   cetirizine (ZYRTEC) 10 MG tablet Take 1 tablet by mouth daily    Historical Provider, MD   metFORMIN (GLUCOPHAGE) 1000 MG tablet Take 1 tablet by mouth 2 times daily (with meals)    Historical Provider, MD   diclofenac sodium (VOLTAREN) 1 % GEL Apply 2 g topically 4 times daily as needed 2/3/20   Historical Provider, MD   buPROPion (WELLBUTRIN XL) 300 MG extended release tablet TAKE 1 TABLET BY MOUTH  EVERY MORNING 19   Tamara Lang MD   spironolactone (ALDACTONE) 50 MG tablet TAKE 1 TABLET BY MOUTH TWO  TIMES DAILY 10/28/19   Tamara Lang MD   omeprazole (PRILOSEC) 20 MG delayed release capsule TAKE 1 CAPSULE BY MOUTH  DAILY 19   Tamara Lang MD   simvastatin (ZOCOR) 40 MG tablet Take 1 tablet by mouth  nightly 19   Tamara Lang MD   Multiple Vitamin (ONE-A-DAY MENS PO) Take by mouth daily    Historical Provider, MD   albuterol sulfate  (90 Base) MCG/ACT inhaler Inhale 2 puffs into the lungs every 6 hours as needed for Shortness of Breath 18   Tamara Lang MD   Elastic Bandages & Supports (V-2 HIGH COMPRESSION HOSE) MISC 30-40mmHg pressure stockings, knee high.  Wear 12 hours while awake and remove at bedtime 3/22/18

## 2023-08-14 NOTE — PROGRESS NOTES
Blood sugar 135. Upon taking vitals in preop, pts HR was noted to be irregular. RN placed pt on bedside monitor and was found to be in NSR with frequent PVCs, occasionally trigeminy. Dr. Perla Moeller made aware.

## 2023-08-14 NOTE — OP NOTE
Patient: Martin Edgar  YOB: 1970  MRN: [id number]    Date of Procedure: 8/14/2023  PROCEDURE NOTE    DATE OF PROCEDURE: 8/14/2023    SURGEON: Selvin Humphreys MD    ASSISTANT: None    PREOPERATIVE DIAGNOSIS: Abdominal pain    POSTOPERATIVE DIAGNOSIS: Suboptimal prep. Sigmoid diverticulosis. No gross lesions. OPERATION: Total colonoscopy to cecum    ANESTHESIA: General    ESTIMATED BLOOD LOSS: None    COMPLICATIONS: None     SPECIMENS:  Was Not Obtained    HISTORY: The patient is a 47y.o. year old male with history of above preop diagnosis. I recommended colonoscopy with possible biopsy or polypectomy and I explained the risk, benefits, expected outcome, and alternatives to the procedure. Risks included but are not limited to bleeding, infection, respiratory distress, hypotension, and perforation of the colon and possibility of missing a lesion. The patient understands and is in agreement. PROCEDURE: The patient was given IV conscious sedation. The patient's SPO2 remained above 90% throughout the procedure. Digital rectal exam was normal.  The colonoscope was inserted through the anus into the rectum and advanced under direct vision to the cecum without difficulty. Terminal ileum was examined for approximately 2 inches. The prep was  suboptimal .      Findings:  Terminal ileum: not examined    Cecum/Ascending colon: Suboptimal prep limited visualization but no gross lesions    Transverse colon: Suboptimal prep. Limited visualization but no gross lesions    Descending/Sigmoid colon: abnormal: Sigmoid diverticulosis. Suboptimal prep limiting some visualization but no gross lesion    Rectum/Anus: examined in normal and retroflexed positions and was normal    Withdrawal Time was (minutes): 20      Next screening colonoscopy: 3 years. If screening is less than 10 years the recommended reason is due: Suboptimal prep    The colon was decompressed.   While withdrawing the scope the above
was examined and remains hemodynamically stable. Discharge home when criteria met. Recommendations/Plan:   F/U Biopsies  F/U In Office as instructed  Discussed with the family  To proceed with colonoscopy today. Ultrasound abdomen revealed possibly lipomas or fat-containing hernias. Definite abdominal wall defect was not identified. This was done in May 2023. CT of the chest abdomen pelvis with contrast on July 10, 2023 reveals no acute pulmonary process. No evidence of pulmonary metastatic disease. No adenopathy in the chest.  Abdomen pelvis revealed asymmetric atrophic left kidney. Nonobstructing left kidney stones. Questionable hepatomegaly with mild hepatic steatosis. No focal liver lesion. No splenomegaly. No bulky adenopathy in the abdomen or pelvis.     Electronically signed by Kellie Langston MD  on 8/14/2023 at 10:26 AM

## 2023-08-14 NOTE — H&P
HISTORY and [address]       NAME:  Li Palacios  MRN: [id number]   YOB: 1970   Date: 8/14/2023   Age: 47 y.o. Gender: male       COMPLAINT AND PRESENT HISTORY:     Li Palacios is 47 y.o.,  male, presents for EGD BIOPSY, COLONOSCOPY DIAGNOSTIC   Primary dx: Abdominal pain, unspecified abdominal location [R10.9]  Chronic GERD [K21.9]. HPI:  Li Palacios is 47 y.o.,  male, will be having a Colonoscopy and EGD. No Prior Colonoscopy, EGD was done years ago. Patient denies any  FH of Colon or esophogeal  Cancer. Patient reports  changes in bowel habits. Pt has intermittent between diarrhea and constipation started two months ago. Pt has GI /Rectal bleeding once, pt states he has hx of hemorrhoid. currently none. No experiencing red/ black/ BRBPR stools. Patient has a history of intermittent  pain in his abdominal in the generalized area with nausea and vomiting. The pain is dull ache and sharp some times. Pt states he has umbilical hernia. Pain rated 7-8/10. Patient denies any Dysphagia. Pt has hx of GERD  Pt is on a PPI. that is helping to control symptoms. Pt denies fever/chills,chest pain or SOB. Test completed r/t condition   US ABDOMEN LIMITED [[id number]]5/11/23    FINDINGS:  In the anterior abdominal wall in the areas of palpable concern there are 2  heterogeneous subcutaneous masses right of the umbilicus measuring 45 x 15 x 31 mm and at the umbilicus measuring 30 x 21 x 31 mm which may represent  lipomas. IMPRESSION:  Heterogeneous subcutaneous masses in the area palpable concern may reflect  lipomas or fat containing hernias. A definite abdominal wall defect is not  demonstrated with exam overall compromised by body habitus.        Review of additional significant medical hx:  (See chart for additional detail, including current medications /see ROS for current S/S): DMII, HTN, HLD,  GERD, Asthma   Pt has iron deficiency

## 2023-08-16 LAB — SURGICAL PATHOLOGY REPORT: NORMAL

## 2023-10-09 ENCOUNTER — HOSPITAL ENCOUNTER (OUTPATIENT)
Dept: PREADMISSION TESTING | Age: 53
Discharge: HOME | End: 2023-10-13
Payer: COMMERCIAL

## 2023-10-09 VITALS
TEMPERATURE: 97.8 F | BODY MASS INDEX: 49.44 KG/M2 | RESPIRATION RATE: 22 BRPM | WEIGHT: 315 LBS | DIASTOLIC BLOOD PRESSURE: 48 MMHG | OXYGEN SATURATION: 98 % | HEIGHT: 67 IN | SYSTOLIC BLOOD PRESSURE: 141 MMHG

## 2023-10-09 LAB
ANION GAP SERPL CALCULATED.3IONS-SCNC: 12 MMOL/L (ref 9–17)
BUN SERPL-MCNC: 23 MG/DL (ref 6–20)
BUN/CREAT SERPL: 21 (ref 9–20)
CALCIUM SERPL-MCNC: 9.3 MG/DL (ref 8.6–10.4)
CHLORIDE SERPL-SCNC: 104 MMOL/L (ref 98–107)
CO2 SERPL-SCNC: 20 MMOL/L (ref 20–31)
CREAT SERPL-MCNC: 1.1 MG/DL (ref 0.7–1.2)
ERYTHROCYTE [DISTWIDTH] IN BLOOD BY AUTOMATED COUNT: 14.4 % (ref 11.8–14.4)
GFR SERPL CREATININE-BSD FRML MDRD: >60 ML/MIN/1.73M2
GLUCOSE SERPL-MCNC: 126 MG/DL (ref 70–99)
HCT VFR BLD AUTO: 33.7 % (ref 40.7–50.3)
HGB BLD-MCNC: 10.9 G/DL (ref 13–17)
MCH RBC QN AUTO: 27.9 PG (ref 25.2–33.5)
MCHC RBC AUTO-ENTMCNC: 32.3 G/DL (ref 28.4–34.8)
MCV RBC AUTO: 86.4 FL (ref 82.6–102.9)
NRBC BLD-RTO: 0 PER 100 WBC
PLATELET # BLD AUTO: 315 K/UL (ref 138–453)
PMV BLD AUTO: 9.2 FL (ref 8.1–13.5)
POTASSIUM SERPL-SCNC: 3.9 MMOL/L (ref 3.7–5.3)
RBC # BLD AUTO: 3.9 M/UL (ref 4.21–5.77)
SODIUM SERPL-SCNC: 136 MMOL/L (ref 135–144)
WBC OTHER # BLD: 14.9 K/UL (ref 3.5–11.3)

## 2023-10-09 PROCEDURE — 80048 BASIC METABOLIC PNL TOTAL CA: CPT

## 2023-10-09 PROCEDURE — 93005 ELECTROCARDIOGRAM TRACING: CPT | Performed by: ANESTHESIOLOGY

## 2023-10-09 PROCEDURE — 85027 COMPLETE CBC AUTOMATED: CPT

## 2023-10-09 PROCEDURE — 83036 HEMOGLOBIN GLYCOSYLATED A1C: CPT

## 2023-10-09 PROCEDURE — 36415 COLL VENOUS BLD VENIPUNCTURE: CPT

## 2023-10-09 RX ORDER — TRAZODONE HYDROCHLORIDE 150 MG/1
75 TABLET ORAL NIGHTLY
COMMUNITY

## 2023-10-09 RX ORDER — RIVAROXABAN 10 MG/1
10 TABLET, FILM COATED ORAL
COMMUNITY
End: 2023-10-09

## 2023-10-09 ASSESSMENT — PAIN DESCRIPTION - LOCATION: LOCATION: KNEE

## 2023-10-09 ASSESSMENT — PAIN DESCRIPTION - DESCRIPTORS: DESCRIPTORS: ACHING;THROBBING

## 2023-10-09 ASSESSMENT — PAIN DESCRIPTION - PAIN TYPE: TYPE: CHRONIC PAIN

## 2023-10-09 ASSESSMENT — PAIN DESCRIPTION - ORIENTATION: ORIENTATION: LEFT

## 2023-10-09 ASSESSMENT — PAIN SCALES - GENERAL: PAINLEVEL_OUTOF10: 8

## 2023-10-09 NOTE — H&P
VARICOSE VEIN SURGERY          Medications Prior to Admission:     Prior to Admission medications    Medication Sig Start Date End Date Taking?  Authorizing Provider   traZODone (DESYREL) 150 MG tablet Take 0.5 tablets by mouth nightly   Yes Bora Reynolds MD   rivaroxaban (XARELTO) 20 MG TABS tablet 1 tablet Daily with supper   Yes Bora Reynolds MD   losartan (COZAAR) 25 MG tablet Take 1 tablet by mouth daily    Bora Reynolds MD   insulin glargine (LANTUS) 100 UNIT/ML injection vial Inject 40 Units into the skin nightly    Bora Reynolds MD   Semaglutide (OZEMPIC, 1 MG/DOSE, SC) Inject 1 mg into the skin once a week On Sundays    Bora Reynolds MD   cetirizine (ZYRTEC) 10 MG tablet Take 1 tablet by mouth daily    Bora Reynolds MD   metFORMIN (GLUCOPHAGE) 1000 MG tablet Take 1 tablet by mouth 2 times daily (with meals)    Bora Reynolds MD   diclofenac sodium (VOLTAREN) 1 % GEL Apply 2 g topically 4 times daily as needed for Pain 2/3/20   Bora Reynolds MD   buPROPion (WELLBUTRIN XL) 300 MG extended release tablet TAKE 1 TABLET BY MOUTH  EVERY MORNING 11/19/19   Tamara Hennessy MD   spironolactone (ALDACTONE) 50 MG tablet TAKE 1 TABLET BY MOUTH TWO  TIMES DAILY  Patient taking differently: Take 1 tablet by mouth 2 times daily TAKE 1 TABLET BY MOUTH TWO  TIMES DAILY 10/28/19   Tamara Hennessy MD   omeprazole (PRILOSEC) 20 MG delayed release capsule TAKE 1 CAPSULE BY MOUTH  DAILY 7/17/19   Tamara Hennessy MD   simvastatin (ZOCOR) 40 MG tablet Take 1 tablet by mouth  nightly  Patient taking differently: Take 1 tablet by mouth nightly Take 1 tablet by mouth  nightly 4/23/19   Tamara Hennessy MD   Multiple Vitamin (ONE-A-DAY MENS PO) Take 1 tablet by mouth daily    Bora Reynolds MD   albuterol sulfate  (90 Base) MCG/ACT inhaler Inhale 2 puffs into the lungs every 6 hours as needed for Shortness of Breath 5/24/18   Marifer Langston MD

## 2023-10-10 LAB
EKG ATRIAL RATE: 86 BPM
EKG P AXIS: 41 DEGREES
EKG P-R INTERVAL: 170 MS
EKG Q-T INTERVAL: 388 MS
EKG QRS DURATION: 94 MS
EKG QTC CALCULATION (BAZETT): 464 MS
EKG R AXIS: -25 DEGREES
EKG T AXIS: 54 DEGREES
EKG VENTRICULAR RATE: 86 BPM
EST. AVERAGE GLUCOSE BLD GHB EST-MCNC: 154 MG/DL
HBA1C MFR BLD: 7 % (ref 4–6)

## 2023-10-10 NOTE — FLOWSHEET NOTE
10/10/23 1554   Anesthesia PAT Clearance   Anesthesia Review Status Anes has reviewed patient for surgery  (Dr. Marie Garcia reviewed whole chart with Emma CNP,ekg,history and is requesting medical clearance)

## 2023-10-20 ENCOUNTER — ANESTHESIA EVENT (OUTPATIENT)
Dept: OPERATING ROOM | Age: 53
End: 2023-10-20
Payer: COMMERCIAL

## 2023-10-23 ENCOUNTER — ANESTHESIA (OUTPATIENT)
Dept: OPERATING ROOM | Age: 53
End: 2023-10-23
Payer: COMMERCIAL

## 2023-10-23 ENCOUNTER — HOSPITAL ENCOUNTER (OUTPATIENT)
Age: 53
Setting detail: OUTPATIENT SURGERY
Discharge: HOME | End: 2023-10-23
Attending: UROLOGY | Admitting: UROLOGY
Payer: COMMERCIAL

## 2023-10-23 VITALS
RESPIRATION RATE: 16 BRPM | WEIGHT: 315 LBS | HEIGHT: 67 IN | SYSTOLIC BLOOD PRESSURE: 125 MMHG | TEMPERATURE: 97.3 F | OXYGEN SATURATION: 96 % | BODY MASS INDEX: 49.44 KG/M2 | HEART RATE: 61 BPM | DIASTOLIC BLOOD PRESSURE: 65 MMHG

## 2023-10-23 DIAGNOSIS — G89.18 POST-OPERATIVE PAIN: Primary | ICD-10-CM

## 2023-10-23 LAB
GLUCOSE BLD-MCNC: 153 MG/DL (ref 75–110)
GLUCOSE BLD-MCNC: 168 MG/DL (ref 75–110)

## 2023-10-23 PROCEDURE — 7100000011 HC PHASE II RECOVERY - ADDTL 15 MIN: Performed by: UROLOGY

## 2023-10-23 PROCEDURE — 6360000002 HC RX W HCPCS: Performed by: UROLOGY

## 2023-10-23 PROCEDURE — 2580000003 HC RX 258: Performed by: ANESTHESIOLOGY

## 2023-10-23 PROCEDURE — 82947 ASSAY GLUCOSE BLOOD QUANT: CPT

## 2023-10-23 PROCEDURE — 3600000002 HC SURGERY LEVEL 2 BASE: Performed by: UROLOGY

## 2023-10-23 PROCEDURE — 7100000010 HC PHASE II RECOVERY - FIRST 15 MIN: Performed by: UROLOGY

## 2023-10-23 PROCEDURE — 3700000001 HC ADD 15 MINUTES (ANESTHESIA): Performed by: UROLOGY

## 2023-10-23 PROCEDURE — 7100000000 HC PACU RECOVERY - FIRST 15 MIN: Performed by: UROLOGY

## 2023-10-23 PROCEDURE — 7100000001 HC PACU RECOVERY - ADDTL 15 MIN: Performed by: UROLOGY

## 2023-10-23 PROCEDURE — 2500000003 HC RX 250 WO HCPCS: Performed by: NURSE ANESTHETIST, CERTIFIED REGISTERED

## 2023-10-23 PROCEDURE — 3700000000 HC ANESTHESIA ATTENDED CARE: Performed by: UROLOGY

## 2023-10-23 PROCEDURE — 3600000012 HC SURGERY LEVEL 2 ADDTL 15MIN: Performed by: UROLOGY

## 2023-10-23 PROCEDURE — 6360000002 HC RX W HCPCS: Performed by: NURSE ANESTHETIST, CERTIFIED REGISTERED

## 2023-10-23 PROCEDURE — 2709999900 HC NON-CHARGEABLE SUPPLY: Performed by: UROLOGY

## 2023-10-23 RX ORDER — SODIUM CHLORIDE 0.9 % (FLUSH) 0.9 %
5-40 SYRINGE (ML) INJECTION PRN
Status: DISCONTINUED | OUTPATIENT
Start: 2023-10-23 | End: 2023-10-23 | Stop reason: HOSPADM

## 2023-10-23 RX ORDER — SODIUM CHLORIDE 9 MG/ML
INJECTION, SOLUTION INTRAVENOUS CONTINUOUS
Status: DISCONTINUED | OUTPATIENT
Start: 2023-10-23 | End: 2023-10-23 | Stop reason: HOSPADM

## 2023-10-23 RX ORDER — TRAMADOL HYDROCHLORIDE 50 MG/1
50 TABLET ORAL EVERY 6 HOURS PRN
Qty: 20 TABLET | Refills: 0 | Status: SHIPPED | OUTPATIENT
Start: 2023-10-23 | End: 2023-11-02

## 2023-10-23 RX ORDER — SODIUM CHLORIDE 9 MG/ML
INJECTION, SOLUTION INTRAVENOUS PRN
Status: DISCONTINUED | OUTPATIENT
Start: 2023-10-23 | End: 2023-10-23 | Stop reason: HOSPADM

## 2023-10-23 RX ORDER — SODIUM CHLORIDE 9 MG/ML
INJECTION, SOLUTION INTRAVENOUS PRN
Status: CANCELLED | OUTPATIENT
Start: 2023-10-23

## 2023-10-23 RX ORDER — DEXAMETHASONE SODIUM PHOSPHATE 10 MG/ML
INJECTION, SOLUTION INTRAMUSCULAR; INTRAVENOUS PRN
Status: DISCONTINUED | OUTPATIENT
Start: 2023-10-23 | End: 2023-10-23 | Stop reason: SDUPTHER

## 2023-10-23 RX ORDER — ROCURONIUM BROMIDE 10 MG/ML
INJECTION, SOLUTION INTRAVENOUS PRN
Status: DISCONTINUED | OUTPATIENT
Start: 2023-10-23 | End: 2023-10-23 | Stop reason: SDUPTHER

## 2023-10-23 RX ORDER — LIDOCAINE HYDROCHLORIDE 10 MG/ML
1 INJECTION, SOLUTION EPIDURAL; INFILTRATION; INTRACAUDAL; PERINEURAL
Status: DISCONTINUED | OUTPATIENT
Start: 2023-10-24 | End: 2023-10-23 | Stop reason: HOSPADM

## 2023-10-23 RX ORDER — HYDROMORPHONE HYDROCHLORIDE 1 MG/ML
0.5 INJECTION, SOLUTION INTRAMUSCULAR; INTRAVENOUS; SUBCUTANEOUS EVERY 5 MIN PRN
Status: CANCELLED | OUTPATIENT
Start: 2023-10-23

## 2023-10-23 RX ORDER — FENTANYL CITRATE 50 UG/ML
25 INJECTION, SOLUTION INTRAMUSCULAR; INTRAVENOUS EVERY 5 MIN PRN
Status: CANCELLED | OUTPATIENT
Start: 2023-10-23

## 2023-10-23 RX ORDER — FENTANYL CITRATE 50 UG/ML
INJECTION, SOLUTION INTRAMUSCULAR; INTRAVENOUS PRN
Status: DISCONTINUED | OUTPATIENT
Start: 2023-10-23 | End: 2023-10-23 | Stop reason: SDUPTHER

## 2023-10-23 RX ORDER — SODIUM CHLORIDE 0.9 % (FLUSH) 0.9 %
5-40 SYRINGE (ML) INJECTION PRN
Status: CANCELLED | OUTPATIENT
Start: 2023-10-23

## 2023-10-23 RX ORDER — SODIUM CHLORIDE 0.9 % (FLUSH) 0.9 %
5-40 SYRINGE (ML) INJECTION EVERY 12 HOURS SCHEDULED
Status: CANCELLED | OUTPATIENT
Start: 2023-10-23

## 2023-10-23 RX ORDER — ONDANSETRON 2 MG/ML
4 INJECTION INTRAMUSCULAR; INTRAVENOUS
Status: CANCELLED | OUTPATIENT
Start: 2023-10-23 | End: 2023-10-24

## 2023-10-23 RX ORDER — SODIUM CHLORIDE 0.9 % (FLUSH) 0.9 %
5-40 SYRINGE (ML) INJECTION EVERY 12 HOURS SCHEDULED
Status: DISCONTINUED | OUTPATIENT
Start: 2023-10-23 | End: 2023-10-23 | Stop reason: HOSPADM

## 2023-10-23 RX ORDER — MIDAZOLAM HYDROCHLORIDE 1 MG/ML
INJECTION INTRAMUSCULAR; INTRAVENOUS PRN
Status: DISCONTINUED | OUTPATIENT
Start: 2023-10-23 | End: 2023-10-23 | Stop reason: SDUPTHER

## 2023-10-23 RX ORDER — DOXYCYCLINE HYCLATE 100 MG
100 TABLET ORAL 2 TIMES DAILY
Qty: 28 TABLET | Refills: 0 | Status: SHIPPED | OUTPATIENT
Start: 2023-10-23 | End: 2023-11-06

## 2023-10-23 RX ORDER — PROPOFOL 10 MG/ML
INJECTION, EMULSION INTRAVENOUS PRN
Status: DISCONTINUED | OUTPATIENT
Start: 2023-10-23 | End: 2023-10-23 | Stop reason: SDUPTHER

## 2023-10-23 RX ORDER — SODIUM CHLORIDE, SODIUM LACTATE, POTASSIUM CHLORIDE, CALCIUM CHLORIDE 600; 310; 30; 20 MG/100ML; MG/100ML; MG/100ML; MG/100ML
INJECTION, SOLUTION INTRAVENOUS CONTINUOUS
Status: DISCONTINUED | OUTPATIENT
Start: 2023-10-23 | End: 2023-10-23 | Stop reason: HOSPADM

## 2023-10-23 RX ORDER — LIDOCAINE HYDROCHLORIDE 20 MG/ML
INJECTION, SOLUTION EPIDURAL; INFILTRATION; INTRACAUDAL; PERINEURAL PRN
Status: DISCONTINUED | OUTPATIENT
Start: 2023-10-23 | End: 2023-10-23 | Stop reason: SDUPTHER

## 2023-10-23 RX ADMIN — DEXAMETHASONE SODIUM PHOSPHATE 4 MG: 10 INJECTION, SOLUTION INTRAMUSCULAR; INTRAVENOUS at 07:51

## 2023-10-23 RX ADMIN — SODIUM CHLORIDE, POTASSIUM CHLORIDE, SODIUM LACTATE AND CALCIUM CHLORIDE: 600; 310; 30; 20 INJECTION, SOLUTION INTRAVENOUS at 06:51

## 2023-10-23 RX ADMIN — PROPOFOL 200 MG: 10 INJECTION, EMULSION INTRAVENOUS at 07:36

## 2023-10-23 RX ADMIN — ROCURONIUM BROMIDE 50 MG: 10 INJECTION, SOLUTION INTRAVENOUS at 07:36

## 2023-10-23 RX ADMIN — FENTANYL CITRATE 100 MCG: 50 INJECTION INTRAMUSCULAR; INTRAVENOUS at 07:36

## 2023-10-23 RX ADMIN — MIDAZOLAM 2 MG: 1 INJECTION INTRAMUSCULAR; INTRAVENOUS at 07:30

## 2023-10-23 RX ADMIN — LIDOCAINE HYDROCHLORIDE 80 MG: 20 INJECTION, SOLUTION EPIDURAL; INFILTRATION; INTRACAUDAL; PERINEURAL at 07:36

## 2023-10-23 RX ADMIN — SUGAMMADEX 400 MG: 100 INJECTION, SOLUTION INTRAVENOUS at 08:18

## 2023-10-23 RX ADMIN — Medication 3000 MG: at 07:45

## 2023-10-23 ASSESSMENT — PAIN - FUNCTIONAL ASSESSMENT
PAIN_FUNCTIONAL_ASSESSMENT: 0-10
PAIN_FUNCTIONAL_ASSESSMENT: PREVENTS OR INTERFERES WITH MANY ACTIVE NOT PASSIVE ACTIVITIES

## 2023-10-23 ASSESSMENT — PAIN DESCRIPTION - DESCRIPTORS: DESCRIPTORS: ACHING

## 2023-10-23 NOTE — H&P
patient. No obstetric history on file. No results for input(s): \"POCGLU\" in the last 72 hours. General Appearance:  Alert, well appearing, and in no acute distress. Morbidly obese. Mental status:  Oriented to person, place, and time. Head:  Normocephalic and atraumatic. Eye:  No icterus, redness, pupils equal and reactive, extraocular eye movements intact, and conjunctiva clear. Ear:  Hearing grossly intact. Nose:  No drainage noted. Mouth:  Mucous membranes moist.  Neck:  Supple and no carotid bruits noted. Lungs:  Bilateral equal air entry, clear to auscultation, no wheezing, rales or rhonchi, and normal effort. Cardiovascular:  Normal rate, irregular rhythm with trigeminal PVC's, no murmur, gallop, or rub. Abdomen: Obese, soft, nontender, nondistended, and active bowel sounds. Neurologic: Antalgic gait. Normal speech and cranial nerves II through XII grossly intact. Strength 5/5 bilaterally. Skin: Small open area on left lower leg with clear drainage - no surrounding redness. No rashes, bruising, or bleeding on exposed skin area. Extremities: Taut bilateral lower extremity pedal edema. Posterior tibial pulses 2+ bilaterally. No calf tenderness with palpation. Psych:  Normal affect. Investigations:      Laboratory Testing:  Recent Results (from the past 24 hour(s))   CBC    Collection Time: 10/09/23  3:58 PM   Result Value Ref Range    WBC 14.9 (H) 3.5 - 11.3 k/uL    RBC 3.90 (L) 4.21 - 5.77 m/uL    Hemoglobin 10.9 (L) 13.0 - 17.0 g/dL    Hematocrit 33.7 (L) 40.7 - 50.3 %    MCV 86.4 82.6 - 102.9 fL    MCH 27.9 25.2 - 33.5 pg    MCHC 32.3 28.4 - 34.8 g/dL    RDW 14.4 11.8 - 14.4 %    Platelets 309 798 - 137 k/uL    MPV 9.2 8.1 - 13.5 fL    NRBC Automated 0.0 0.0 per 100 WBC       Recent Labs     10/09/23  1558   HGB 10.9*   HCT 33.7*   WBC 14.9*   MCV 86.4       No results for input(s): \"COVID19\" in the last 720 hours.     *Please note that labs listed above are the most recent lab values

## 2023-10-23 NOTE — OP NOTE
Dr. Risa Grant MD  Urologic Surgery      [address], [place]. [facility]  10/23/23    Patient:  Woody Ferguson  MRN: [id number]  YOB: 1970    Surgeon: Dr. Risa Grant MD  Assistant: None    Pre-op Diagnosis: Urethrocutaneous fistula  Post-op Diagnosis: Same    Procedure:   Procedure(s):  URETHROCUTANEOUS FISTULA REPAIR    Anesthesia: General  Complications: None  OR Blood Loss:  Minimal  Fluids: Cystalloids  Specimens: None    Indication:  Very pleasant 60-year-old male with history of complex urethral reconstruction. He recently underwent a two-stage buccal urethroplasty and had a urethrocutaneous fistula developed. We allowed this to heal for nearly 9 months and he presents today for repair. After risks and benefits were explained he elects to proceed with today's procedure. Narrative of the Procedure:    After informed consent was obtained he was taken to the operating room and transferred to the operating table in the supine position. Anesthesia was induced. Antibiotics were given. He was placed in a supine position and sterilely prepped and draped after he was strapped to the bed. At this point we placed a penile traction stitch. With the patient's penis on stretch as able to evaluate the urethrocutaneous fistula which was near the penoscrotal junction on the ventral aspect of the penis. He is roughly 2 cm in size. I used a marking pen to outline this area. I then used a 15 blade scalpel to circumscribe this area and develop flaps. We then closed the fistula in the midline using a running 4-0 Vicryl stitch. This first layer was imbricated with a second layer also 4-0 Vicryl. The closure was tested and was noted to be watertight. There is no leakage of urine whatsoever. I then did a third layer and then closed the skin all with 4-0 Vicryl. Skin glue was placed. I then placed a 14 Telugu red rubber past the area of reconstruction and cut it to size.   I then placed

## 2023-10-23 NOTE — ANESTHESIA POSTPROCEDURE EVALUATION
Department of Anesthesiology  Postprocedure Note    Patient: Rajinder Deleon  MRN: [id number]  YOB: 1970  Date of evaluation: 10/23/2023      Procedure Summary     Date: 10/23/23 Room / Location: [address] - INPATIENT    Anesthesia Start: 0730 Anesthesia Stop: 0305    Procedure: URETHROCUTANEOUS FISTULA REPAIR Diagnosis:       Urethral fistula      Stricture of male urethra, unspecified stricture type      (Urethral fistula [N36.0])      (Stricture of male urethra, unspecified stricture type [N35.919])    Surgeons: Triston Solis MD Responsible Provider: Gayatri Ornelas DO    Anesthesia Type: general ASA Status: 3          Anesthesia Type: No value filed.     David Phase I: David Score: 10    David Phase II: David Score: 10      Anesthesia Post Evaluation    Patient location during evaluation: PACU  Patient participation: complete - patient participated  Level of consciousness: awake and alert  Airway patency: patent  Nausea & Vomiting: no nausea and no vomiting  Complications: no  Cardiovascular status: hemodynamically stable  Respiratory status: acceptable  Hydration status: stable  Pain management: adequate

## 2023-10-23 NOTE — DISCHARGE INSTRUCTIONS
-My office will contact you in the next 2-3 business days to schedule a follow-up appointment. If you do not hear from my office in a timely manner please feel free to call us at [phone]. Follow-up should be scheduled for 2 weeks. -Regular Diet.  -Resume all home medications. RESUME ASPIRIN AND OTHER BLOOD THINNERS ON WEDNESDAY,.  -Do not operative heavy machinery if you are taking Percocet (oxycodone), Norco/Vicodin (hydrocodone), Tylenol #3 (codeine), or Ultram (tramadol). These medications may affect your reaction time and impair your decision making abilities. Additionally, these medications are quite constipating. To combat this you will be prescribed a medication called Colace (docusate sodium). We recommend that you take this stool softener twice daily.  -At this time it is ok for you to resume showering. If you have wounds, gently pat them dry when toweling off. Additionally, you should avoid from submerging any wounds under water.  -Walking is important and encourage. Although you may experience pain after leaving us, frequent short walks are important.       Best,  Dr. Tomas Vega MD

## 2023-10-27 ENCOUNTER — TELEMEDICINE (OUTPATIENT)
Dept: FAMILY MEDICINE CLINIC | Age: 53
End: 2023-10-27
Payer: COMMERCIAL

## 2023-10-27 DIAGNOSIS — J01.90 ACUTE BACTERIAL SINUSITIS: ICD-10-CM

## 2023-10-27 DIAGNOSIS — U07.1 COVID-19: Primary | ICD-10-CM

## 2023-10-27 DIAGNOSIS — B96.89 ACUTE BACTERIAL SINUSITIS: ICD-10-CM

## 2023-10-27 PROCEDURE — G8427 DOCREV CUR MEDS BY ELIG CLIN: HCPCS | Performed by: NURSE PRACTITIONER

## 2023-10-27 PROCEDURE — 99213 OFFICE O/P EST LOW 20 MIN: CPT | Performed by: NURSE PRACTITIONER

## 2023-10-27 PROCEDURE — 3017F COLORECTAL CA SCREEN DOC REV: CPT | Performed by: NURSE PRACTITIONER

## 2023-10-27 RX ORDER — FLUTICASONE PROPIONATE 50 MCG
1 SPRAY, SUSPENSION (ML) NASAL DAILY
Qty: 32 G | Refills: 0 | Status: SHIPPED | OUTPATIENT
Start: 2023-10-27

## 2023-10-27 ASSESSMENT — ENCOUNTER SYMPTOMS
SHORTNESS OF BREATH: 0
ABDOMINAL DISTENTION: 0
VOMITING: 0
COUGH: 1
NAUSEA: 0
ABDOMINAL PAIN: 0
SINUS PAIN: 1
SINUS PRESSURE: 1
CONSTIPATION: 0
SORE THROAT: 0
CHEST TIGHTNESS: 0
RHINORRHEA: 0
DIARRHEA: 0
BACK PAIN: 0

## 2023-10-27 NOTE — PROGRESS NOTES
into the skin once a week On Sundays      cetirizine (ZYRTEC) 10 MG tablet Take 1 tablet by mouth daily      metFORMIN (GLUCOPHAGE) 1000 MG tablet Take 1 tablet by mouth 2 times daily (with meals)      diclofenac sodium (VOLTAREN) 1 % GEL Apply 2 g topically 4 times daily as needed for Pain      buPROPion (WELLBUTRIN XL) 300 MG extended release tablet TAKE 1 TABLET BY MOUTH  EVERY MORNING 90 tablet 1    spironolactone (ALDACTONE) 50 MG tablet TAKE 1 TABLET BY MOUTH TWO  TIMES DAILY (Patient taking differently: Take 1 tablet by mouth 2 times daily TAKE 1 TABLET BY MOUTH TWO  TIMES DAILY) 180 tablet 1    omeprazole (PRILOSEC) 20 MG delayed release capsule TAKE 1 CAPSULE BY MOUTH  DAILY 90 capsule 1    simvastatin (ZOCOR) 40 MG tablet Take 1 tablet by mouth  nightly (Patient taking differently: Take 1 tablet by mouth nightly Take 1 tablet by mouth  nightly) 90 tablet 1    Multiple Vitamin (ONE-A-DAY MENS PO) Take 1 tablet by mouth daily      albuterol sulfate  (90 Base) MCG/ACT inhaler Inhale 2 puffs into the lungs every 6 hours as needed for Shortness of Breath 1 Inhaler 5    Elastic Bandages & Supports (V-2 HIGH COMPRESSION HOSE) MISC 30-40mmHg pressure stockings, knee high. Wear 12 hours while awake and remove at bedtime 2 each 0    Blood Glucose Monitoring Suppl (DigiMeld CONTOUR MONITOR) W/DEVICE KIT use twice a day  0    MICROLET LANCETS MISC use twice a day  0    glucose blood VI test strips (BL TEST STRIP PACK) strip 1 each by Does not apply route 2 times daily. As needed. 100 each 3    nitroGLYCERIN (NITROSTAT) 0.4 MG SL tablet Place 1 tablet under the tongue every 5 minutes as needed      CRANBERRY 500 mg by Does not apply route      FISH OIL Take 1,000 mg by mouth in the morning and at bedtime Please get dosage      aspirin 81 MG tablet Take 1 tablet by mouth daily       No current facility-administered medications on file prior to visit.      SUBJECTIVE:     Review of Systems   Constitutional:  Negative

## 2024-07-15 ENCOUNTER — HOSPITAL ENCOUNTER (OUTPATIENT)
Age: 54
Setting detail: SPECIMEN
Discharge: HOME | End: 2024-07-15

## 2024-07-15 DIAGNOSIS — R60.0 LOWER EXTREMITY EDEMA: ICD-10-CM

## 2024-07-15 DIAGNOSIS — I42.9 CARDIOMYOPATHY, UNSPECIFIED TYPE (HCC): ICD-10-CM

## 2024-07-15 DIAGNOSIS — R94.31 ABNORMAL ECG: ICD-10-CM

## 2024-07-15 LAB
ANION GAP SERPL CALCULATED.3IONS-SCNC: 15 MMOL/L (ref 9–16)
BUN SERPL-MCNC: 22 MG/DL (ref 6–20)
CALCIUM SERPL-MCNC: 9.5 MG/DL (ref 8.6–10.4)
CHLORIDE SERPL-SCNC: 104 MMOL/L (ref 98–107)
CO2 SERPL-SCNC: 19 MMOL/L (ref 20–31)
CREAT SERPL-MCNC: 1.3 MG/DL (ref 0.7–1.2)
GFR, ESTIMATED: 69 ML/MIN/1.73M2
GLUCOSE SERPL-MCNC: 178 MG/DL (ref 74–99)
POTASSIUM SERPL-SCNC: 4.8 MMOL/L (ref 3.7–5.3)
SODIUM SERPL-SCNC: 138 MMOL/L (ref 136–145)

## 2024-07-30 ENCOUNTER — HOSPITAL ENCOUNTER (OUTPATIENT)
Age: 54
Setting detail: SPECIMEN
Discharge: HOME | End: 2024-07-30

## 2024-07-30 DIAGNOSIS — R60.0 LOWER EXTREMITY EDEMA: ICD-10-CM

## 2024-07-30 LAB
BASOPHILS # BLD: 0 K/UL (ref 0–0.2)
BASOPHILS NFR BLD: 0 % (ref 0–2)
EOSINOPHIL # BLD: 0.74 K/UL (ref 0–0.4)
EOSINOPHILS RELATIVE PERCENT: 4 % (ref 1–4)
ERYTHROCYTE [DISTWIDTH] IN BLOOD BY AUTOMATED COUNT: 13.2 % (ref 11.8–14.4)
HCT VFR BLD AUTO: 34.9 % (ref 40.7–50.3)
HGB BLD-MCNC: 11.3 G/DL (ref 13–17)
IMM GRANULOCYTES # BLD AUTO: 0 K/UL (ref 0–0.3)
IMM GRANULOCYTES NFR BLD: 0 %
LYMPHOCYTES NFR BLD: 7.59 K/UL (ref 1–4.8)
LYMPHOCYTES RELATIVE PERCENT: 41 % (ref 24–44)
MCH RBC QN AUTO: 28.3 PG (ref 25.2–33.5)
MCHC RBC AUTO-ENTMCNC: 32.4 G/DL (ref 28.4–34.8)
MCV RBC AUTO: 87.5 FL (ref 82.6–102.9)
MONOCYTES NFR BLD: 0.93 K/UL (ref 0.1–0.8)
MONOCYTES NFR BLD: 5 % (ref 1–7)
MORPHOLOGY: NORMAL
NEUTROPHILS NFR BLD: 50 % (ref 36–66)
NEUTS SEG NFR BLD: 9.24 K/UL (ref 1.8–7.7)
NRBC BLD-RTO: 0 PER 100 WBC
PLATELET # BLD AUTO: 368 K/UL (ref 138–453)
PMV BLD AUTO: 9.9 FL (ref 8.1–13.5)
RBC # BLD AUTO: 3.99 M/UL (ref 4.21–5.77)
WBC OTHER # BLD: 18.5 K/UL (ref 3.5–11.3)

## 2024-07-31 LAB
ALBUMIN SERPL-MCNC: 4.5 G/DL (ref 3.5–5.2)
ALBUMIN/GLOB SERPL: 1 {RATIO} (ref 1–2.5)
ALP SERPL-CCNC: 96 U/L (ref 40–129)
ALT SERPL-CCNC: 30 U/L (ref 10–50)
ANION GAP SERPL CALCULATED.3IONS-SCNC: 18 MMOL/L (ref 9–16)
ANION GAP SERPL CALCULATED.3IONS-SCNC: 18 MMOL/L (ref 9–16)
AST SERPL-CCNC: 27 U/L (ref 10–50)
BILIRUB SERPL-MCNC: 0.3 MG/DL (ref 0–1.2)
BUN SERPL-MCNC: 31 MG/DL (ref 6–20)
BUN SERPL-MCNC: 31 MG/DL (ref 6–20)
CALCIUM SERPL-MCNC: 10 MG/DL (ref 8.6–10.4)
CALCIUM SERPL-MCNC: 9.9 MG/DL (ref 8.6–10.4)
CHLORIDE SERPL-SCNC: 100 MMOL/L (ref 98–107)
CHLORIDE SERPL-SCNC: 100 MMOL/L (ref 98–107)
CO2 SERPL-SCNC: 18 MMOL/L (ref 20–31)
CO2 SERPL-SCNC: 18 MMOL/L (ref 20–31)
CREAT SERPL-MCNC: 1.3 MG/DL (ref 0.7–1.2)
CREAT SERPL-MCNC: 1.4 MG/DL (ref 0.7–1.2)
FERRITIN SERPL-MCNC: 250 NG/ML (ref 30–400)
GFR, ESTIMATED: 63 ML/MIN/1.73M2
GFR, ESTIMATED: 64 ML/MIN/1.73M2
GLUCOSE SERPL-MCNC: 126 MG/DL (ref 74–99)
GLUCOSE SERPL-MCNC: 132 MG/DL (ref 74–99)
IRON SATN MFR SERPL: 17 % (ref 20–55)
IRON SERPL-MCNC: 48 UG/DL (ref 61–157)
POTASSIUM SERPL-SCNC: 4.6 MMOL/L (ref 3.7–5.3)
POTASSIUM SERPL-SCNC: 4.7 MMOL/L (ref 3.7–5.3)
PROT SERPL-MCNC: 8.3 G/DL (ref 6.6–8.7)
SODIUM SERPL-SCNC: 136 MMOL/L (ref 136–145)
SODIUM SERPL-SCNC: 136 MMOL/L (ref 136–145)
TIBC SERPL-MCNC: 278 UG/DL (ref 250–450)
UNSATURATED IRON BINDING CAPACITY: 230 UG/DL (ref 112–347)

## 2024-08-13 ENCOUNTER — HOSPITAL ENCOUNTER (OUTPATIENT)
Age: 54
Discharge: HOME | End: 2024-08-15
Attending: INTERNAL MEDICINE
Payer: COMMERCIAL

## 2024-08-13 DIAGNOSIS — R60.0 LOWER EXTREMITY EDEMA: ICD-10-CM

## 2024-08-13 LAB
ECHO AO ROOT DIAM: 3.5 CM
ECHO AV AREA PEAK VELOCITY: 1.8 CM2
ECHO AV AREA VTI: 1.9 CM2
ECHO AV MEAN GRADIENT: 5 MMHG
ECHO AV MEAN VELOCITY: 1.1 M/S
ECHO AV PEAK GRADIENT: 9 MMHG
ECHO AV PEAK VELOCITY: 1.5 M/S
ECHO AV VELOCITY RATIO: 0.53
ECHO AV VTI: 33.1 CM
ECHO EST RA PRESSURE: 8 MMHG
ECHO LA AREA 2C: 17.5 CM2
ECHO LA AREA 4C: 20.9 CM2
ECHO LA DIAMETER: 3.6 CM
ECHO LA MAJOR AXIS: 5.9 CM
ECHO LA MINOR AXIS: 6.1 CM
ECHO LA TO AORTIC ROOT RATIO: 1.03
ECHO LA VOL BP: 50 ML (ref 18–58)
ECHO LA VOL MOD A2C: 40 ML (ref 18–58)
ECHO LA VOL MOD A4C: 59 ML (ref 18–58)
ECHO LV E' LATERAL VELOCITY: 16 CM/S
ECHO LV E' SEPTAL VELOCITY: 9 CM/S
ECHO LV EF PHYSICIAN: 50 %
ECHO LV FRACTIONAL SHORTENING: 30 % (ref 28–44)
ECHO LV INTERNAL DIMENSION DIASTOLIC: 5 CM (ref 4.2–5.9)
ECHO LV INTERNAL DIMENSION SYSTOLIC: 3.5 CM
ECHO LV IVSD: 1.2 CM (ref 0.6–1)
ECHO LV MASS 2D: 233.7 G (ref 88–224)
ECHO LV POSTERIOR WALL DIASTOLIC: 1.2 CM (ref 0.6–1)
ECHO LV RELATIVE WALL THICKNESS RATIO: 0.48
ECHO LVOT AREA: 3.5 CM2
ECHO LVOT AV VTI INDEX: 0.55
ECHO LVOT DIAM: 2.1 CM
ECHO LVOT MEAN GRADIENT: 1 MMHG
ECHO LVOT PEAK GRADIENT: 2 MMHG
ECHO LVOT PEAK VELOCITY: 0.8 M/S
ECHO LVOT SV: 63.4 ML
ECHO LVOT VTI: 18.3 CM
ECHO MV A VELOCITY: 0.59 M/S
ECHO MV AREA VTI: 1.6 CM2
ECHO MV E DECELERATION TIME (DT): 172 MS
ECHO MV E VELOCITY: 0.87 M/S
ECHO MV E/A RATIO: 1.47
ECHO MV E/E' LATERAL: 5.44
ECHO MV E/E' RATIO (AVERAGED): 7.55
ECHO MV E/E' SEPTAL: 9.67
ECHO MV LVOT VTI INDEX: 2.14
ECHO MV MAX VELOCITY: 1.1 M/S
ECHO MV MEAN GRADIENT: 1 MMHG
ECHO MV MEAN VELOCITY: 0.5 M/S
ECHO MV PEAK GRADIENT: 5 MMHG
ECHO MV VTI: 39.1 CM
ECHO RA AREA 4C: 14.5 CM2
ECHO RA VOLUME: 35 ML
ECHO RIGHT VENTRICULAR SYSTOLIC PRESSURE (RVSP): 16 MMHG
ECHO RV TAPSE: 2.9 CM (ref 1.7–?)
ECHO TV REGURGITANT MAX VELOCITY: 1.4 M/S
ECHO TV REGURGITANT PEAK GRADIENT: 8 MMHG

## 2024-08-13 PROCEDURE — 93306 TTE W/DOPPLER COMPLETE: CPT

## 2024-08-13 PROCEDURE — 93306 TTE W/DOPPLER COMPLETE: CPT | Performed by: INTERNAL MEDICINE

## 2025-01-06 ENCOUNTER — OFFICE VISIT (OUTPATIENT)
Dept: FAMILY MEDICINE CLINIC | Age: 55
End: 2025-01-06
Payer: COMMERCIAL

## 2025-01-06 ENCOUNTER — HOSPITAL ENCOUNTER (OUTPATIENT)
Age: 55
Setting detail: SPECIMEN
Discharge: HOME | End: 2025-01-06

## 2025-01-06 VITALS
WEIGHT: 315 LBS | HEART RATE: 84 BPM | TEMPERATURE: 97.3 F | OXYGEN SATURATION: 96 % | RESPIRATION RATE: 16 BRPM | HEIGHT: 67 IN | BODY MASS INDEX: 49.44 KG/M2 | DIASTOLIC BLOOD PRESSURE: 66 MMHG | SYSTOLIC BLOOD PRESSURE: 136 MMHG

## 2025-01-06 DIAGNOSIS — I48.91 ATRIAL FIBRILLATION, UNSPECIFIED TYPE (HCC): ICD-10-CM

## 2025-01-06 DIAGNOSIS — K46.9 NON-RECURRENT ABDOMINAL HERNIA WITHOUT OBSTRUCTION OR GANGRENE, UNSPECIFIED HERNIA TYPE: ICD-10-CM

## 2025-01-06 DIAGNOSIS — I10 ESSENTIAL HYPERTENSION: ICD-10-CM

## 2025-01-06 DIAGNOSIS — E11.9 TYPE 2 DIABETES MELLITUS WITHOUT COMPLICATION, WITHOUT LONG-TERM CURRENT USE OF INSULIN (HCC): ICD-10-CM

## 2025-01-06 DIAGNOSIS — F41.9 ANXIETY: ICD-10-CM

## 2025-01-06 DIAGNOSIS — K21.9 GASTROESOPHAGEAL REFLUX DISEASE, UNSPECIFIED WHETHER ESOPHAGITIS PRESENT: ICD-10-CM

## 2025-01-06 DIAGNOSIS — J45.20 MILD INTERMITTENT ASTHMA WITHOUT COMPLICATION: ICD-10-CM

## 2025-01-06 DIAGNOSIS — E78.2 MIXED HYPERLIPIDEMIA: ICD-10-CM

## 2025-01-06 DIAGNOSIS — G47.33 OBSTRUCTIVE SLEEP APNEA SYNDROME: ICD-10-CM

## 2025-01-06 DIAGNOSIS — Z79.899 ON STATIN THERAPY: ICD-10-CM

## 2025-01-06 DIAGNOSIS — D50.9 IRON DEFICIENCY ANEMIA, UNSPECIFIED IRON DEFICIENCY ANEMIA TYPE: ICD-10-CM

## 2025-01-06 DIAGNOSIS — I10 ESSENTIAL HYPERTENSION: Primary | ICD-10-CM

## 2025-01-06 DIAGNOSIS — D72.829 LEUKOCYTOSIS, UNSPECIFIED TYPE: ICD-10-CM

## 2025-01-06 LAB
ALBUMIN SERPL-MCNC: 3.8 G/DL (ref 3.5–5.2)
ALBUMIN/GLOB SERPL: 1.1 {RATIO} (ref 1–2.5)
ALP SERPL-CCNC: 77 U/L (ref 40–129)
ALT SERPL-CCNC: 21 U/L (ref 10–50)
ANION GAP SERPL CALCULATED.3IONS-SCNC: 14 MMOL/L (ref 9–16)
AST SERPL-CCNC: 24 U/L (ref 10–50)
BILIRUB SERPL-MCNC: <0.2 MG/DL (ref 0–1.2)
BUN SERPL-MCNC: 26 MG/DL (ref 6–20)
CALCIUM SERPL-MCNC: 9.7 MG/DL (ref 8.6–10.4)
CHLORIDE SERPL-SCNC: 105 MMOL/L (ref 98–107)
CHOLEST SERPL-MCNC: 126 MG/DL (ref 0–199)
CHOLESTEROL/HDL RATIO: 3.2
CO2 SERPL-SCNC: 19 MMOL/L (ref 20–31)
CREAT SERPL-MCNC: 1.3 MG/DL (ref 0.7–1.2)
ERYTHROCYTE [DISTWIDTH] IN BLOOD BY AUTOMATED COUNT: 12.9 % (ref 11.8–14.4)
EST. AVERAGE GLUCOSE BLD GHB EST-MCNC: 154 MG/DL
GFR, ESTIMATED: 66 ML/MIN/1.73M2
GLUCOSE SERPL-MCNC: 119 MG/DL (ref 74–99)
HBA1C MFR BLD: 7 % (ref 4–6)
HCT VFR BLD AUTO: 31.8 % (ref 40.7–50.3)
HDLC SERPL-MCNC: 40 MG/DL
HGB BLD-MCNC: 10.1 G/DL (ref 13–17)
LDLC SERPL CALC-MCNC: 55 MG/DL (ref 0–100)
MCH RBC QN AUTO: 28.7 PG (ref 25.2–33.5)
MCHC RBC AUTO-ENTMCNC: 31.8 G/DL (ref 28.4–34.8)
MCV RBC AUTO: 90.3 FL (ref 82.6–102.9)
NRBC BLD-RTO: 0 PER 100 WBC
PLATELET # BLD AUTO: 351 K/UL (ref 138–453)
PMV BLD AUTO: 9.6 FL (ref 8.1–13.5)
POTASSIUM SERPL-SCNC: 4.7 MMOL/L (ref 3.7–5.3)
PROT SERPL-MCNC: 7.2 G/DL (ref 6.6–8.7)
RBC # BLD AUTO: 3.52 M/UL (ref 4.21–5.77)
SODIUM SERPL-SCNC: 138 MMOL/L (ref 136–145)
TRIGL SERPL-MCNC: 155 MG/DL
VLDLC SERPL CALC-MCNC: 31 MG/DL (ref 1–30)
WBC OTHER # BLD: 15.8 K/UL (ref 3.5–11.3)

## 2025-01-06 PROCEDURE — 99214 OFFICE O/P EST MOD 30 MIN: CPT | Performed by: NURSE PRACTITIONER

## 2025-01-06 PROCEDURE — 3078F DIAST BP <80 MM HG: CPT | Performed by: NURSE PRACTITIONER

## 2025-01-06 PROCEDURE — 3075F SYST BP GE 130 - 139MM HG: CPT | Performed by: NURSE PRACTITIONER

## 2025-01-06 RX ORDER — CEPHALEXIN 500 MG/1
500 CAPSULE ORAL 2 TIMES DAILY
COMMUNITY

## 2025-01-06 RX ORDER — SPIRONOLACTONE 50 MG/1
50 TABLET, FILM COATED ORAL 2 TIMES DAILY
Qty: 180 TABLET | Refills: 0 | Status: SHIPPED
Start: 2025-01-06 | End: 2025-04-06

## 2025-01-06 RX ORDER — AMOXICILLIN AND CLAVULANATE POTASSIUM 500; 125 MG/1; MG/1
1 TABLET, FILM COATED ORAL 2 TIMES DAILY
COMMUNITY

## 2025-01-06 RX ORDER — SIMVASTATIN 40 MG
40 TABLET ORAL NIGHTLY
Qty: 90 TABLET | Refills: 3
Start: 2025-01-06

## 2025-01-06 SDOH — ECONOMIC STABILITY: FOOD INSECURITY: WITHIN THE PAST 12 MONTHS, YOU WORRIED THAT YOUR FOOD WOULD RUN OUT BEFORE YOU GOT MONEY TO BUY MORE.: OFTEN TRUE

## 2025-01-06 SDOH — ECONOMIC STABILITY: FOOD INSECURITY: WITHIN THE PAST 12 MONTHS, THE FOOD YOU BOUGHT JUST DIDN'T LAST AND YOU DIDN'T HAVE MONEY TO GET MORE.: OFTEN TRUE

## 2025-01-06 SDOH — ECONOMIC STABILITY: INCOME INSECURITY: HOW HARD IS IT FOR YOU TO PAY FOR THE VERY BASICS LIKE FOOD, HOUSING, MEDICAL CARE, AND HEATING?: NOT HARD AT ALL

## 2025-01-06 ASSESSMENT — ENCOUNTER SYMPTOMS
SORE THROAT: 0
NAUSEA: 0
CHEST TIGHTNESS: 0
RHINORRHEA: 0
SHORTNESS OF BREATH: 0
CONSTIPATION: 0
SINUS PAIN: 0
VOMITING: 0
BACK PAIN: 0
ABDOMINAL DISTENTION: 0
COUGH: 0
DIARRHEA: 0
ABDOMINAL PAIN: 1

## 2025-01-06 ASSESSMENT — PATIENT HEALTH QUESTIONNAIRE - PHQ9
1. LITTLE INTEREST OR PLEASURE IN DOING THINGS: SEVERAL DAYS
SUM OF ALL RESPONSES TO PHQ QUESTIONS 1-9: 2
SUM OF ALL RESPONSES TO PHQ QUESTIONS 1-9: 2
SUM OF ALL RESPONSES TO PHQ9 QUESTIONS 1 & 2: 2
SUM OF ALL RESPONSES TO PHQ QUESTIONS 1-9: 2
2. FEELING DOWN, DEPRESSED OR HOPELESS: SEVERAL DAYS
SUM OF ALL RESPONSES TO PHQ QUESTIONS 1-9: 2

## 2025-01-06 NOTE — PROGRESS NOTES
prescribed.  - Avoid triggers that may exacerbate symptoms.       8. BMI 60.0-69.9, adult (HCC)  - Stable; Continue to decrease, fats, carbohydrates, and engage in a healthier diet overall, as well as routine exercise (40 minutes of aerobic exercise- moderate to vigorous intensity three to four times a week).        9. Anxiety  - Stable: Pt encouraged to deep breath and relax through anxious moments. Reassurance offered; Non-pharmological coping methods encouraged  - Continue with Wellbutrin 300 mg daily as previously prescribed.     10. Atrial fibrillation, unspecified type (HCC)  - Stable: Medication re-filled as needed, con't medications as prescribed, con't current tx plan   - Continue Metoprolol XL 50 mg nightly as previously prescribed.    - Continue Xarelto 20 mg dialy as previously prescribed.    - Continue with ASA 81 mg daily as previously prescribed.    - Will cont to follow with [facility] as instructed      11. Iron deficiency anemia, unspecified iron deficiency anemia type  12. Leukocytosis, unspecified type  - WBC remains elevated and has been  - Peripheral smear noted  - Will cont to follow with Dr. Agustin as instructed     13. Non-recurrent abdominal hernia without obstruction or gangrene, unspecified hernia type  - US noted - limited study secondary to body habitus  - Worsening abdominal discomfort  - Will get CT abdomen/pelvis and call with results     - Medications, labs, diagnostic studies, consultations and follow-up as documented in this encounter.    - On this date January 6, 2025,  I have spent greater than 50% of this 40 minute visit reviewing previous notes, test results and/or face to face with the patient discussing the diagnoses, importance of compliance with the treatment plan, counseling, coordinating care as well as documenting on the day of the visit.     Electronically signed by VALERIE Chester NP on 1/6/2025 at 11:14 AM

## 2025-02-03 ENCOUNTER — ANESTHESIA (OUTPATIENT)
Dept: OPERATING ROOM | Age: 55
End: 2025-02-03
Payer: COMMERCIAL

## 2025-02-03 ENCOUNTER — HOSPITAL ENCOUNTER (OUTPATIENT)
Age: 55
Setting detail: OUTPATIENT SURGERY
Discharge: HOME | End: 2025-02-03
Attending: UROLOGY | Admitting: UROLOGY
Payer: COMMERCIAL

## 2025-02-03 ENCOUNTER — ANESTHESIA EVENT (OUTPATIENT)
Dept: OPERATING ROOM | Age: 55
End: 2025-02-03
Payer: COMMERCIAL

## 2025-02-03 VITALS
TEMPERATURE: 97.5 F | DIASTOLIC BLOOD PRESSURE: 63 MMHG | RESPIRATION RATE: 18 BRPM | HEART RATE: 57 BPM | SYSTOLIC BLOOD PRESSURE: 138 MMHG | BODY MASS INDEX: 47.74 KG/M2 | OXYGEN SATURATION: 100 % | HEIGHT: 68 IN | WEIGHT: 315 LBS

## 2025-02-03 DIAGNOSIS — R32 URINARY INCONTINENCE, UNSPECIFIED TYPE: ICD-10-CM

## 2025-02-03 LAB — GLUCOSE BLD-MCNC: 131 MG/DL (ref 75–110)

## 2025-02-03 PROCEDURE — 2500000003 HC RX 250 WO HCPCS: Performed by: NURSE ANESTHETIST, CERTIFIED REGISTERED

## 2025-02-03 PROCEDURE — 88112 CYTOPATH CELL ENHANCE TECH: CPT

## 2025-02-03 PROCEDURE — 3700000001 HC ADD 15 MINUTES (ANESTHESIA): Performed by: UROLOGY

## 2025-02-03 PROCEDURE — 2709999900 HC NON-CHARGEABLE SUPPLY: Performed by: UROLOGY

## 2025-02-03 PROCEDURE — 3600000002 HC SURGERY LEVEL 2 BASE: Performed by: UROLOGY

## 2025-02-03 PROCEDURE — 6360000002 HC RX W HCPCS: Performed by: UROLOGY

## 2025-02-03 PROCEDURE — 82947 ASSAY GLUCOSE BLOOD QUANT: CPT

## 2025-02-03 PROCEDURE — 2580000003 HC RX 258: Performed by: ANESTHESIOLOGY

## 2025-02-03 PROCEDURE — 7100000011 HC PHASE II RECOVERY - ADDTL 15 MIN: Performed by: UROLOGY

## 2025-02-03 PROCEDURE — 7100000010 HC PHASE II RECOVERY - FIRST 15 MIN: Performed by: UROLOGY

## 2025-02-03 PROCEDURE — 3700000000 HC ANESTHESIA ATTENDED CARE: Performed by: UROLOGY

## 2025-02-03 PROCEDURE — 6360000002 HC RX W HCPCS: Performed by: NURSE ANESTHETIST, CERTIFIED REGISTERED

## 2025-02-03 PROCEDURE — 3600000012 HC SURGERY LEVEL 2 ADDTL 15MIN: Performed by: UROLOGY

## 2025-02-03 RX ORDER — DIPHENHYDRAMINE HYDROCHLORIDE 50 MG/ML
12.5 INJECTION INTRAMUSCULAR; INTRAVENOUS
Status: CANCELLED | OUTPATIENT
Start: 2025-02-03 | End: 2025-02-04

## 2025-02-03 RX ORDER — ONDANSETRON 2 MG/ML
4 INJECTION INTRAMUSCULAR; INTRAVENOUS
Status: CANCELLED | OUTPATIENT
Start: 2025-02-03 | End: 2025-02-04

## 2025-02-03 RX ORDER — PROCHLORPERAZINE EDISYLATE 5 MG/ML
5 INJECTION INTRAMUSCULAR; INTRAVENOUS
Status: CANCELLED | OUTPATIENT
Start: 2025-02-03 | End: 2025-02-04

## 2025-02-03 RX ORDER — LIDOCAINE HYDROCHLORIDE 20 MG/ML
INJECTION, SOLUTION EPIDURAL; INFILTRATION; INTRACAUDAL; PERINEURAL
Status: DISCONTINUED | OUTPATIENT
Start: 2025-02-03 | End: 2025-02-03 | Stop reason: SDUPTHER

## 2025-02-03 RX ORDER — PROPOFOL 10 MG/ML
INJECTION, EMULSION INTRAVENOUS
Status: DISCONTINUED | OUTPATIENT
Start: 2025-02-03 | End: 2025-02-03 | Stop reason: SDUPTHER

## 2025-02-03 RX ORDER — FENTANYL CITRATE 50 UG/ML
25 INJECTION, SOLUTION INTRAMUSCULAR; INTRAVENOUS EVERY 5 MIN PRN
Status: CANCELLED | OUTPATIENT
Start: 2025-02-03

## 2025-02-03 RX ORDER — SODIUM CHLORIDE, SODIUM LACTATE, POTASSIUM CHLORIDE, CALCIUM CHLORIDE 600; 310; 30; 20 MG/100ML; MG/100ML; MG/100ML; MG/100ML
INJECTION, SOLUTION INTRAVENOUS CONTINUOUS
Status: DISCONTINUED | OUTPATIENT
Start: 2025-02-03 | End: 2025-02-03 | Stop reason: HOSPADM

## 2025-02-03 RX ADMIN — Medication 25 MG: at 12:47

## 2025-02-03 RX ADMIN — LIDOCAINE HYDROCHLORIDE 60 MG: 20 INJECTION, SOLUTION EPIDURAL; INFILTRATION; INTRACAUDAL; PERINEURAL at 12:44

## 2025-02-03 RX ADMIN — SODIUM CHLORIDE, POTASSIUM CHLORIDE, SODIUM LACTATE AND CALCIUM CHLORIDE: 600; 310; 30; 20 INJECTION, SOLUTION INTRAVENOUS at 12:23

## 2025-02-03 RX ADMIN — PROPOFOL 125 MCG/KG/MIN: 10 INJECTION, EMULSION INTRAVENOUS at 12:44

## 2025-02-03 RX ADMIN — Medication 3000 MG: at 12:46

## 2025-02-03 ASSESSMENT — PAIN DESCRIPTION - DESCRIPTORS: DESCRIPTORS: ACHING;BURNING

## 2025-02-03 ASSESSMENT — PAIN - FUNCTIONAL ASSESSMENT: PAIN_FUNCTIONAL_ASSESSMENT: 0-10

## 2025-02-03 ASSESSMENT — ENCOUNTER SYMPTOMS: SHORTNESS OF BREATH: 0

## 2025-02-03 NOTE — H&P
Interval H&P Note    Pt Name: Jameson Charles  MRN: [id number]  YOB: 1970  Date of evaluation: 2/3/2025      [x] I have reviewed in EPIC the progress note by ISIS Rodriguez dated 01/06/2025 for an Interval History and Physical note.     [x] I have examined  Jameson Charles, a 54 y.o. male.There are no changes to the patient who is scheduled for CYSTOSCOPY by Gregg Benjamin MD for Urinary incontinence, unspecified type. Patient has a history of distal urethra stricture with hypospadias repair in childhood, overactive bladder, urge incontinence, and UTIs. He has had dilation in the past and Johanson stage 1 procedure in 2022. 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 Today, he reports urinary frequency, intermittent incontinence, hematuria, and nocturia. The patient denies new health changes, fever, chills, wheezing, cough, increased SOB, chest pain, open sores or wounds. Known diabetes, will review POC BS. Last xarelto and aspirin dose 02/02/25.    Vital signs: BP (!) 118/54   Pulse 54   Temp 97.7 °F (36.5 °C)   Resp 22   Ht 1.727 m (5' 8\")   Wt (!) 178.7 kg (394 lb)   SpO2 97%   BMI 59.91 kg/m²     Allergies:  Empagliflozin    Medications:    Prior to Admission medications    Medication Sig Start Date End Date Taking? Authorizing Provider   Turmeric (QC TUMERIC COMPLEX PO) Take by mouth   Yes Provider, MD Bora   simvastatin (ZOCOR) 40 MG tablet Take 1 tablet by mouth nightly Take 1 tablet by mouth  nightly 1/6/25  Yes Xiomara Sanabria APRN - NP   spironolactone (ALDACTONE) 50 MG tablet Take 1 tablet by mouth 2 times daily TAKE 1 TABLET BY MOUTH TWO  TIMES DAILY 1/6/25 4/6/25 Yes Xiomara Sanabria APRN - NP   rivaroxaban (XARELTO) 20 MG TABS tablet Take 1 tablet by mouth daily 12/9/24  Yes Jeffrey Orozco DO   furosemide (LASIX) 20 MG tablet Take 0.5 tablets by mouth daily 7/21/24  Yes Jeffrey Orozco DO   solifenacin (VESICARE) 10 MG tablet Take 1

## 2025-02-03 NOTE — H&P
Cal Guo, Sourav, Marlen, Luz, Barbi, Paco, & Brody  Urology H&P      Patient:  Jameson Charles  MRN: [id number]  YOB: 1970    CHIEF COMPLAINT:  Gross Hematuria    HISTORY OF PRESENT ILLNESS:   The patient is a 54 y.o. male who presents for cysto    Patient's old records, notes and chart reviewed and summarized above.    Past Medical History:    Past Medical History:   Diagnosis Date    Abdominal pain     Acute pyelonephritis 10/30/2018    Anemia     Anxiety     Arthritis     osteoarthitis     Asthma     Sees Pulmonologist Dr Brant Ramey    Atrial fibrillation (HCC)     Atrophic kidney     left side    Cellulitis of left lower extremity 10/02/2021    left    Chronic back pain 2005    Complicated UTI (urinary tract infection) 10/01/2021    Depression     Essential hypertension 11/21/2016    Frequent UTI     GERD (gastroesophageal reflux disease)     Hyperlipidemia     On statin (9/19/22)    Insomnia     Irritable bowel syndrome     Kidney stone     in left kidney per pt it is 5-6 cm    Left ventricular enlargement     See ECHO 11/06/2019    Liver disease     fatty liver    Migraines     MRSA infection     Left knee, states \"rare strain\"- had maybe 15-20 years ago    MVP (mitral valve prolapse)     used to see Dr Rivas and now sees cardiologist Dr Whitley Mendes    Neuropathy     hands and left foot     Nonalcoholic hepatosteatosis 08/02/2013    Obesity 2001    Osteoarthritis 2009    Prolonged emergence from general anesthesia     Per pt.    Recurrent nephrolithiasis 10/31/2018    Screening PSA (prostate specific antigen) 07/13/2009    Skipped beats     Patient states skipped beats or extra beats noted in the past    Type 2 diabetes mellitus (HCC)     Checks blood sugar at home, averages 130-150, last A1C 11.8%, [facility] ([facility]22)    Umbilical hernia     pt still has hernia    Unspecified sleep apnea     BiPAP    Urethral stricture     Urinary

## 2025-02-03 NOTE — ANESTHESIA PRE PROCEDURE
Department of Anesthesiology  Preprocedure Note       Name:  Jameson Charles   Age:  54 y.o.  :  1970                                          MRN:  [id number]         Date:  2/3/2025      Surgeon: Surgeon(s):  Gregg Benjamin MD    Procedure: Procedure(s):  CYSTOSCOPY    Medications prior to admission:   Prior to Admission medications    Medication Sig Start Date End Date Taking? Authorizing Provider   Turmeric (QC TUMERIC COMPLEX PO) Take by mouth   Yes Bora Reynolds MD   simvastatin (ZOCOR) 40 MG tablet Take 1 tablet by mouth nightly Take 1 tablet by mouth  nightly 25  Yes Xiomara Sanabria APRN - NP   spironolactone (ALDACTONE) 50 MG tablet Take 1 tablet by mouth 2 times daily TAKE 1 TABLET BY MOUTH TWO  TIMES DAILY 25 Yes Xiomara Sanabria APRN - NP   rivaroxaban (XARELTO) 20 MG TABS tablet Take 1 tablet by mouth daily 24  Yes Jeffrey Orozco DO   furosemide (LASIX) 20 MG tablet Take 0.5 tablets by mouth daily 24  Yes Jeffrey Orozco DO   solifenacin (VESICARE) 10 MG tablet Take 1 tablet by mouth daily 24  Yes ProviderBora MD   metoprolol succinate (TOPROL XL) 50 MG extended release tablet Take 1 tablet by mouth at bedtime 24  Yes Jeffrey Orozco DO   fluticasone (FLONASE) 50 MCG/ACT nasal spray 1 spray by Each Nostril route daily 10/27/23  Yes Xiomara Sanabria APRN - NP   traZODone (DESYREL) 150 MG tablet Take 0.5 tablets by mouth nightly   Yes ProviderBora MD   losartan (COZAAR) 25 MG tablet Take 1 tablet by mouth daily   Yes Provider, MD Bora   insulin glargine (LANTUS) 100 UNIT/ML injection vial Inject 40 Units into the skin nightly   Yes ProviderBora MD   cetirizine (ZYRTEC) 10 MG tablet Take 1 tablet by mouth daily   Yes ProviderBora MD   metFORMIN (GLUCOPHAGE) 1000 MG tablet Take 1 tablet by mouth 2 times daily (with meals)   Yes ProviderBora MD   buPROPion (WELLBUTRIN XL) 300 MG extended release tablet TAKE 1

## 2025-02-03 NOTE — OP NOTE
Dr. Gregg Benjamin MD  Urologic Surgery        [place], [place]. [facility]  02/03/25    Patient:  Jameson Charles  MRN: [id number]  YOB: 1970    Surgeon: Dr. Gregg Benjamin MD  Assistant: None    Pre-op Diagnosis: Gross Hematurai  Post-op Diagnosis: Same    Procedure:   Cystoscopy.    Anesthesia: MAC  Complications: None  OR Blood Loss: Minimal  Fluids: Cystalloids  Specimens: Urine for cytology    Indication:  54-year-old male with gross hematuria.  CT scheduled tomorrow.  Presents today for a cystoscopy.    Narrative of the Procedure:    After informed consent was obtained in the preoperative area, the patient was taken back to the operating room and remained on the hospital gurEllinger. The patient was prepped and draped in a sterile manner. A time out occurred in which two patient identifiers were used. The flexible scope was carefully placed into the bladder.    Findings:  Urethra: Normal.  No stricture or stenosis.  Prostate: Patent.  Minimal BPH.  No bleeding vessels.  Bladder Neck: Normal  Papillary lesions: None.  I did send a urine for cytology by collecting 60 cc of a bladder wash.  Trabeculations: Minimal  Cellules/Diverticula: None  Bladder Stones: None  Ureteral Orifices: Normal with clear efflux bilaterally    OVERALL IMPRESSION: Normal cystoscopy.      Follow-Up: Patient needs a telehealth then 2 weeks to review his CT scan which is scheduled for tomorrow at [facility] and cytology results from today.    Gregg Benjamin MD  Electronically signed on 2/3/2025 at 12:55 PM

## 2025-02-03 NOTE — ANESTHESIA POSTPROCEDURE EVALUATION
Department of Anesthesiology  Postprocedure Note    Patient: aJmeson Charles  MRN: [id number]  YOB: 1970  Date of evaluation: 2/3/2025    Procedure Summary       Date: 02/03/25 Room / Location: [facility]    Anesthesia Start: 1241 Anesthesia Stop: 1304    Procedure: CYSTOSCOPY Diagnosis:       Urinary incontinence, unspecified type      (Urinary incontinence, unspecified type [R32])    Surgeons: Gregg Benjamin MD Responsible Provider: Mabel Woodard MD    Anesthesia Type: MAC ASA Status: 4            Anesthesia Type: No value filed.    David Phase I: David Score: 10    David Phase II:      Anesthesia Post Evaluation    Airway patency: patent  Cardiovascular status: hemodynamically stable  Respiratory status: acceptable    No notable events documented.

## 2025-02-04 ENCOUNTER — HOSPITAL ENCOUNTER (OUTPATIENT)
Dept: CT IMAGING | Age: 55
Discharge: HOME | End: 2025-02-06
Payer: COMMERCIAL

## 2025-02-04 DIAGNOSIS — K46.9 NON-RECURRENT ABDOMINAL HERNIA WITHOUT OBSTRUCTION OR GANGRENE, UNSPECIFIED HERNIA TYPE: ICD-10-CM

## 2025-02-04 LAB
CASE NUMBER:: NORMAL
SPECIMEN DESCRIPTION: NORMAL
SURGICAL PATHOLOGY REPORT: NORMAL

## 2025-02-04 PROCEDURE — 2500000003 HC RX 250 WO HCPCS: Performed by: NURSE PRACTITIONER

## 2025-02-04 PROCEDURE — 74177 CT ABD & PELVIS W/CONTRAST: CPT

## 2025-02-04 PROCEDURE — 6360000004 HC RX CONTRAST MEDICATION: Performed by: NURSE PRACTITIONER

## 2025-02-04 PROCEDURE — 2580000003 HC RX 258: Performed by: NURSE PRACTITIONER

## 2025-02-04 RX ORDER — SODIUM CHLORIDE 9 MG/ML
INJECTION, SOLUTION INTRAVENOUS ONCE
Status: COMPLETED | OUTPATIENT
Start: 2025-02-04 | End: 2025-02-04

## 2025-02-04 RX ORDER — SODIUM CHLORIDE 0.9 % (FLUSH) 0.9 %
10 SYRINGE (ML) INJECTION PRN
Status: COMPLETED | OUTPATIENT
Start: 2025-02-04 | End: 2025-02-04

## 2025-02-04 RX ORDER — IOPAMIDOL 755 MG/ML
75 INJECTION, SOLUTION INTRAVASCULAR
Status: COMPLETED | OUTPATIENT
Start: 2025-02-04 | End: 2025-02-04

## 2025-02-04 RX ADMIN — IOPAMIDOL 75 ML: 755 INJECTION, SOLUTION INTRAVENOUS at 18:16

## 2025-02-04 RX ADMIN — SODIUM CHLORIDE, PRESERVATIVE FREE 10 ML: 5 INJECTION INTRAVENOUS at 18:17

## 2025-02-04 RX ADMIN — SODIUM CHLORIDE: 9 INJECTION, SOLUTION INTRAVENOUS at 18:17

## 2025-02-04 RX ADMIN — BARIUM SULFATE 450 ML: 20 SUSPENSION ORAL at 18:17

## 2025-03-06 ENCOUNTER — OFFICE VISIT (OUTPATIENT)
Dept: FAMILY MEDICINE CLINIC | Age: 55
End: 2025-03-06
Payer: COMMERCIAL

## 2025-03-06 VITALS
BODY MASS INDEX: 49.44 KG/M2 | RESPIRATION RATE: 16 BRPM | HEART RATE: 64 BPM | OXYGEN SATURATION: 97 % | TEMPERATURE: 97.2 F | WEIGHT: 315 LBS | HEIGHT: 67 IN | SYSTOLIC BLOOD PRESSURE: 134 MMHG | DIASTOLIC BLOOD PRESSURE: 66 MMHG

## 2025-03-06 DIAGNOSIS — J45.20 MILD INTERMITTENT ASTHMA WITHOUT COMPLICATION: ICD-10-CM

## 2025-03-06 DIAGNOSIS — Z79.899 ON STATIN THERAPY: ICD-10-CM

## 2025-03-06 DIAGNOSIS — K21.9 GASTROESOPHAGEAL REFLUX DISEASE, UNSPECIFIED WHETHER ESOPHAGITIS PRESENT: ICD-10-CM

## 2025-03-06 DIAGNOSIS — I10 ESSENTIAL HYPERTENSION: ICD-10-CM

## 2025-03-06 DIAGNOSIS — D72.829 LEUKOCYTOSIS, UNSPECIFIED TYPE: ICD-10-CM

## 2025-03-06 DIAGNOSIS — D50.9 IRON DEFICIENCY ANEMIA, UNSPECIFIED IRON DEFICIENCY ANEMIA TYPE: ICD-10-CM

## 2025-03-06 DIAGNOSIS — G47.33 OBSTRUCTIVE SLEEP APNEA SYNDROME: ICD-10-CM

## 2025-03-06 DIAGNOSIS — Z01.818 PREOPERATIVE CLEARANCE: Primary | ICD-10-CM

## 2025-03-06 DIAGNOSIS — E11.9 TYPE 2 DIABETES MELLITUS WITHOUT COMPLICATION, WITHOUT LONG-TERM CURRENT USE OF INSULIN (HCC): ICD-10-CM

## 2025-03-06 DIAGNOSIS — I48.91 ATRIAL FIBRILLATION, UNSPECIFIED TYPE (HCC): ICD-10-CM

## 2025-03-06 DIAGNOSIS — E78.2 MIXED HYPERLIPIDEMIA: ICD-10-CM

## 2025-03-06 DIAGNOSIS — F41.9 ANXIETY: ICD-10-CM

## 2025-03-06 PROCEDURE — 3051F HG A1C>EQUAL 7.0%<8.0%: CPT | Performed by: NURSE PRACTITIONER

## 2025-03-06 PROCEDURE — 3075F SYST BP GE 130 - 139MM HG: CPT | Performed by: NURSE PRACTITIONER

## 2025-03-06 PROCEDURE — 99214 OFFICE O/P EST MOD 30 MIN: CPT | Performed by: NURSE PRACTITIONER

## 2025-03-06 PROCEDURE — 3078F DIAST BP <80 MM HG: CPT | Performed by: NURSE PRACTITIONER

## 2025-03-06 SDOH — ECONOMIC STABILITY: FOOD INSECURITY: WITHIN THE PAST 12 MONTHS, YOU WORRIED THAT YOUR FOOD WOULD RUN OUT BEFORE YOU GOT MONEY TO BUY MORE.: OFTEN TRUE

## 2025-03-06 ASSESSMENT — ENCOUNTER SYMPTOMS
CONSTIPATION: 0
ABDOMINAL PAIN: 1
SINUS PAIN: 0
DIARRHEA: 0
COUGH: 0
BACK PAIN: 0
ABDOMINAL DISTENTION: 0
SHORTNESS OF BREATH: 0
RHINORRHEA: 0
VOMITING: 0
CHEST TIGHTNESS: 0
NAUSEA: 0
SORE THROAT: 0

## 2025-03-06 NOTE — PROGRESS NOTES
Xiomara Sanabria, APRN-CNP  [facility]  [facility]  [address], [address]  [facility] [address]  Dept: [phone]  Dept Fax: [phone]    Patient ID: Jameson Charles is a 54 y.o. male.    HPI:  Jameson Charles is a 54 y.o. male is an established patient who presents to the office today for a preoperative consultation at the request of surgeon Dr. Vick who plans on performing an umbilical hernia repair. A date has not been set as of yet.  This consultation is requested for the specific conditions prompting preoperative evaluation (i.e. because of potential affect on operative risk): HTN, HLD, A-fib, shortness of breath, Asthma, CHELSEA, obesity.  Planned anesthesia is General.  The patient has the following known anesthesia issues: NONE  Patient has a bleeding risk of : currently on Xarelto for A-fib; per cardiology, okay to hold 2 days prior to OR.  Patient does not have objection to receiving blood products if needed. He did see cardiology, Dr. Orozco for clearance. He has an EKG and stress test scheuled for 3/14. He has also seen pulmonology, Dr. Liz who for clearance as well.     Previous office notes, labs and diagnostic studies were reviewed prior to and during encounter.The patient's past medical, surgical, social, and family history as well as her current medications and allergies were reviewed as documented in today's encounter by JAMES Alcazar.      Current Outpatient Medications on File Prior to Visit   Medication Sig Dispense Refill    Turmeric (QC TUMERIC COMPLEX PO) Take by mouth      simvastatin (ZOCOR) 40 MG tablet Take 1 tablet by mouth nightly Take 1 tablet by mouth  nightly 90 tablet 3    spironolactone (ALDACTONE) 50 MG tablet Take 1 tablet by mouth 2 times daily TAKE 1 TABLET BY MOUTH TWO  TIMES DAILY 180 tablet 0    rivaroxaban (XARELTO) 20 MG TABS tablet Take 1 tablet by mouth daily 90 tablet 3    furosemide (LASIX) 20 MG tablet

## 2025-03-11 ENCOUNTER — TELEPHONE (OUTPATIENT)
Dept: FAMILY MEDICINE CLINIC | Age: 55
End: 2025-03-11

## 2025-03-11 NOTE — TELEPHONE ENCOUNTER
Patient would like a call back from clinical staff to discuss a \"ADA accommodation Request Form\". Please advise.

## 2025-04-07 ENCOUNTER — OFFICE VISIT (OUTPATIENT)
Dept: FAMILY MEDICINE CLINIC | Age: 55
End: 2025-04-07
Payer: COMMERCIAL

## 2025-04-07 VITALS
SYSTOLIC BLOOD PRESSURE: 126 MMHG | WEIGHT: 315 LBS | OXYGEN SATURATION: 97 % | HEIGHT: 67 IN | HEART RATE: 87 BPM | TEMPERATURE: 97.2 F | RESPIRATION RATE: 16 BRPM | BODY MASS INDEX: 49.44 KG/M2 | DIASTOLIC BLOOD PRESSURE: 72 MMHG

## 2025-04-07 DIAGNOSIS — I10 ESSENTIAL HYPERTENSION: ICD-10-CM

## 2025-04-07 DIAGNOSIS — K21.9 GASTROESOPHAGEAL REFLUX DISEASE, UNSPECIFIED WHETHER ESOPHAGITIS PRESENT: ICD-10-CM

## 2025-04-07 DIAGNOSIS — E11.9 TYPE 2 DIABETES MELLITUS WITHOUT COMPLICATION, WITHOUT LONG-TERM CURRENT USE OF INSULIN: ICD-10-CM

## 2025-04-07 DIAGNOSIS — D72.829 LEUKOCYTOSIS, UNSPECIFIED TYPE: ICD-10-CM

## 2025-04-07 DIAGNOSIS — E78.2 MIXED HYPERLIPIDEMIA: ICD-10-CM

## 2025-04-07 DIAGNOSIS — Z00.00 PREVENTATIVE HEALTH CARE: ICD-10-CM

## 2025-04-07 DIAGNOSIS — J45.20 MILD INTERMITTENT ASTHMA WITHOUT COMPLICATION: ICD-10-CM

## 2025-04-07 DIAGNOSIS — I48.91 ATRIAL FIBRILLATION, UNSPECIFIED TYPE (HCC): ICD-10-CM

## 2025-04-07 DIAGNOSIS — Z79.899 ON STATIN THERAPY: ICD-10-CM

## 2025-04-07 DIAGNOSIS — Z00.00 ANNUAL PHYSICAL EXAM: Primary | ICD-10-CM

## 2025-04-07 DIAGNOSIS — D50.9 IRON DEFICIENCY ANEMIA, UNSPECIFIED IRON DEFICIENCY ANEMIA TYPE: ICD-10-CM

## 2025-04-07 DIAGNOSIS — F41.9 ANXIETY: ICD-10-CM

## 2025-04-07 DIAGNOSIS — G47.33 OBSTRUCTIVE SLEEP APNEA SYNDROME: ICD-10-CM

## 2025-04-07 LAB — HBA1C MFR BLD: 6.2 %

## 2025-04-07 PROCEDURE — 83036 HEMOGLOBIN GLYCOSYLATED A1C: CPT | Performed by: NURSE PRACTITIONER

## 2025-04-07 PROCEDURE — 99396 PREV VISIT EST AGE 40-64: CPT | Performed by: NURSE PRACTITIONER

## 2025-04-07 PROCEDURE — 3074F SYST BP LT 130 MM HG: CPT | Performed by: NURSE PRACTITIONER

## 2025-04-07 PROCEDURE — 3078F DIAST BP <80 MM HG: CPT | Performed by: NURSE PRACTITIONER

## 2025-04-07 ASSESSMENT — ENCOUNTER SYMPTOMS
SHORTNESS OF BREATH: 0
SINUS PAIN: 0
NAUSEA: 0
VOMITING: 0
CHEST TIGHTNESS: 0
CONSTIPATION: 0
SORE THROAT: 0
ABDOMINAL PAIN: 1
BACK PAIN: 0
ABDOMINAL DISTENTION: 0
DIARRHEA: 0
RHINORRHEA: 0
COUGH: 0

## 2025-04-07 NOTE — PROGRESS NOTES
Xiomara Sanabria, APRN-CNP  [facility]  [facility]  [address], [address]  [facility] [address]  Dept: [phone]  Dept Fax: [phone]    Patient ID: Jameson Charles is a 54 y.o. male.    HPI:  Established pt here today for annual physical exam and to f/u on chronic medical problems; HTN, HLD, DM, GERD, CHELSEA, asthma, fatty liver, anxiety, a-fib, go over labs and/or diagnostic studies, and medication refills.  Pt denies any fever or chills.  Pt today denies any HA, chest pain, or SOB.  Pt denies any N/V/D/C or abdominal pain. Pt states mood is stable on meds. Pt stable on CPAP with his sleep apnea and has been using nightly. Pt states asthma is stable on inhalers. Pt with occasional heartburn, but stable on meds. He is scheduled for an umbilical hernia repair with Dr. Vick 5/2.     Previous office notes, labs and diagnostic studies were reviewed prior to and during encounter.The patient's past medical, surgical, social, and family history as well as her current medications and allergies were reviewed as documented in today's encounter by JAMES Alcazar.      Current Outpatient Medications on File Prior to Visit   Medication Sig Dispense Refill    nitroGLYCERIN (NITROSTAT) 0.4 MG SL tablet Place 1 tablet under the tongue every 5 minutes as needed for Chest pain 25 tablet 3    Turmeric (QC TUMERIC COMPLEX PO) Take by mouth      simvastatin (ZOCOR) 40 MG tablet Take 1 tablet by mouth nightly Take 1 tablet by mouth  nightly 90 tablet 3    spironolactone (ALDACTONE) 50 MG tablet Take 1 tablet by mouth 2 times daily TAKE 1 TABLET BY MOUTH TWO  TIMES DAILY 180 tablet 0    rivaroxaban (XARELTO) 20 MG TABS tablet Take 1 tablet by mouth daily 90 tablet 3    furosemide (LASIX) 20 MG tablet Take 0.5 tablets by mouth daily (Patient taking differently: Take 1 tablet by mouth daily) 45 tablet 3    solifenacin (VESICARE) 10 MG tablet Take 1 tablet by mouth daily

## 2025-04-15 ENCOUNTER — RESULTS FOLLOW-UP (OUTPATIENT)
Dept: FAMILY MEDICINE CLINIC | Age: 55
End: 2025-04-15

## 2025-04-15 ENCOUNTER — HOSPITAL ENCOUNTER (OUTPATIENT)
Age: 55
Discharge: HOME | End: 2025-04-19

## 2025-04-15 ENCOUNTER — HOSPITAL ENCOUNTER (OUTPATIENT)
Age: 55
Discharge: HOME | End: 2025-04-15
Payer: COMMERCIAL

## 2025-04-15 VITALS
SYSTOLIC BLOOD PRESSURE: 121 MMHG | HEART RATE: 73 BPM | TEMPERATURE: 97.6 F | DIASTOLIC BLOOD PRESSURE: 59 MMHG | WEIGHT: 315 LBS | RESPIRATION RATE: 24 BRPM | BODY MASS INDEX: 49.44 KG/M2 | OXYGEN SATURATION: 96 % | HEIGHT: 67 IN

## 2025-04-15 DIAGNOSIS — I10 ESSENTIAL HYPERTENSION: ICD-10-CM

## 2025-04-15 DIAGNOSIS — E78.2 MIXED HYPERLIPIDEMIA: ICD-10-CM

## 2025-04-15 DIAGNOSIS — E11.9 TYPE 2 DIABETES MELLITUS WITHOUT COMPLICATION, WITHOUT LONG-TERM CURRENT USE OF INSULIN: ICD-10-CM

## 2025-04-15 LAB
ALBUMIN SERPL-MCNC: 4.1 G/DL (ref 3.5–5.2)
ALBUMIN SERPL-MCNC: 4.1 G/DL (ref 3.5–5.2)
ALP SERPL-CCNC: 79 U/L (ref 40–129)
ALP SERPL-CCNC: 80 U/L (ref 40–129)
ALT SERPL-CCNC: 22 U/L (ref 10–50)
ALT SERPL-CCNC: 23 U/L (ref 10–50)
ANION GAP SERPL CALCULATED.3IONS-SCNC: 15 MMOL/L (ref 9–16)
ANION GAP SERPL CALCULATED.3IONS-SCNC: 15 MMOL/L (ref 9–16)
AST SERPL-CCNC: 21 U/L (ref 10–50)
AST SERPL-CCNC: 21 U/L (ref 10–50)
BASOPHILS # BLD: 0.2 K/UL (ref 0–0.2)
BASOPHILS NFR BLD: 1 % (ref 0–2)
BILIRUB SERPL-MCNC: 0.2 MG/DL (ref 0–1.2)
BILIRUB SERPL-MCNC: 0.2 MG/DL (ref 0–1.2)
BUN SERPL-MCNC: 26 MG/DL (ref 6–20)
BUN SERPL-MCNC: 27 MG/DL (ref 6–20)
CALCIUM SERPL-MCNC: 9.5 MG/DL (ref 8.6–10.4)
CALCIUM SERPL-MCNC: 9.5 MG/DL (ref 8.6–10.4)
CHLORIDE SERPL-SCNC: 103 MMOL/L (ref 98–107)
CHLORIDE SERPL-SCNC: 103 MMOL/L (ref 98–107)
CHOLEST SERPL-MCNC: 127 MG/DL (ref 0–199)
CHOLESTEROL/HDL RATIO: 3.4
CO2 SERPL-SCNC: 18 MMOL/L (ref 20–31)
CO2 SERPL-SCNC: 18 MMOL/L (ref 20–31)
CREAT SERPL-MCNC: 1.3 MG/DL (ref 0.7–1.2)
CREAT SERPL-MCNC: 1.3 MG/DL (ref 0.7–1.2)
CREAT UR-MCNC: 114 MG/DL (ref 39–259)
EOSINOPHIL # BLD: 0.1 K/UL (ref 0–0.4)
EOSINOPHILS RELATIVE PERCENT: 1 % (ref 0–4)
ERYTHROCYTE [DISTWIDTH] IN BLOOD BY AUTOMATED COUNT: 13.1 % (ref 11.5–14.9)
ERYTHROCYTE [DISTWIDTH] IN BLOOD BY AUTOMATED COUNT: 13.1 % (ref 11.5–14.9)
EST. AVERAGE GLUCOSE BLD GHB EST-MCNC: 134 MG/DL
FERRITIN SERPL-MCNC: 627 NG/ML (ref 30–400)
FOLATE SERPL-MCNC: 18.8 NG/ML (ref 4.8–24.2)
GFR, ESTIMATED: 66 ML/MIN/1.73M2
GFR, ESTIMATED: 66 ML/MIN/1.73M2
GLUCOSE SERPL-MCNC: 138 MG/DL (ref 74–99)
GLUCOSE SERPL-MCNC: 140 MG/DL (ref 74–99)
HBA1C MFR BLD: 6.3 % (ref 4–6)
HCT VFR BLD AUTO: 33.7 % (ref 41–53)
HCT VFR BLD AUTO: 33.7 % (ref 41–53)
HDLC SERPL-MCNC: 37 MG/DL
HGB BLD-MCNC: 11.4 G/DL (ref 13.5–17.5)
HGB BLD-MCNC: 11.4 G/DL (ref 13.5–17.5)
IRON SATN MFR SERPL: 20 % (ref 20–55)
IRON SERPL-MCNC: 47 UG/DL (ref 61–157)
LDLC SERPL CALC-MCNC: 61 MG/DL (ref 0–100)
LYMPHOCYTES NFR BLD: 2.3 K/UL (ref 1–4.8)
LYMPHOCYTES RELATIVE PERCENT: 16 % (ref 24–44)
MCH RBC QN AUTO: 29.1 PG (ref 26–34)
MCH RBC QN AUTO: 29.1 PG (ref 26–34)
MCHC RBC AUTO-ENTMCNC: 33.7 G/DL (ref 31–37)
MCHC RBC AUTO-ENTMCNC: 33.7 G/DL (ref 31–37)
MCV RBC AUTO: 86.2 FL (ref 80–100)
MCV RBC AUTO: 86.2 FL (ref 80–100)
MICROALBUMIN UR-MCNC: 84 MG/L (ref 0–20)
MICROALBUMIN/CREAT UR-RTO: 74 MCG/MG CREAT (ref 0–17)
MONOCYTES NFR BLD: 1.1 K/UL (ref 0.1–1.3)
MONOCYTES NFR BLD: 8 % (ref 1–7)
NEUTROPHILS NFR BLD: 74 % (ref 36–66)
NEUTS SEG NFR BLD: 10.5 K/UL (ref 1.3–9.1)
PLATELET # BLD AUTO: 348 K/UL (ref 150–450)
PLATELET # BLD AUTO: 348 K/UL (ref 150–450)
PMV BLD AUTO: 7.1 FL (ref 6–12)
PMV BLD AUTO: 7.1 FL (ref 6–12)
POTASSIUM SERPL-SCNC: 4.3 MMOL/L (ref 3.7–5.3)
POTASSIUM SERPL-SCNC: 4.3 MMOL/L (ref 3.7–5.3)
PROT SERPL-MCNC: 8.1 G/DL (ref 6.6–8.7)
PROT SERPL-MCNC: 8.1 G/DL (ref 6.6–8.7)
RBC # BLD AUTO: 3.91 M/UL (ref 4.5–5.9)
RBC # BLD AUTO: 3.91 M/UL (ref 4.5–5.9)
SODIUM SERPL-SCNC: 136 MMOL/L (ref 136–145)
SODIUM SERPL-SCNC: 136 MMOL/L (ref 136–145)
TIBC SERPL-MCNC: 235 UG/DL (ref 250–450)
TRIGL SERPL-MCNC: 146 MG/DL (ref 0–149)
UNSATURATED IRON BINDING CAPACITY: 188 UG/DL (ref 112–347)
VIT B12 SERPL-MCNC: 727 PG/ML (ref 232–1245)
WBC OTHER # BLD: 14.3 K/UL (ref 3.5–11)
WBC OTHER # BLD: 14.3 K/UL (ref 3.5–11)

## 2025-04-15 PROCEDURE — 82746 ASSAY OF FOLIC ACID SERUM: CPT

## 2025-04-15 PROCEDURE — 82043 UR ALBUMIN QUANTITATIVE: CPT

## 2025-04-15 PROCEDURE — 82570 ASSAY OF URINE CREATININE: CPT

## 2025-04-15 PROCEDURE — 82607 VITAMIN B-12: CPT

## 2025-04-15 PROCEDURE — 80061 LIPID PANEL: CPT

## 2025-04-15 PROCEDURE — 36415 COLL VENOUS BLD VENIPUNCTURE: CPT

## 2025-04-15 PROCEDURE — 83540 ASSAY OF IRON: CPT

## 2025-04-15 PROCEDURE — 80053 COMPREHEN METABOLIC PANEL: CPT

## 2025-04-15 PROCEDURE — 83036 HEMOGLOBIN GLYCOSYLATED A1C: CPT

## 2025-04-15 PROCEDURE — 83550 IRON BINDING TEST: CPT

## 2025-04-15 PROCEDURE — 82728 ASSAY OF FERRITIN: CPT

## 2025-04-15 PROCEDURE — 85025 COMPLETE CBC W/AUTO DIFF WBC: CPT

## 2025-04-15 RX ORDER — MIRABEGRON 50 MG/1
50 TABLET, FILM COATED, EXTENDED RELEASE ORAL DAILY
COMMUNITY

## 2025-04-15 ASSESSMENT — PAIN DESCRIPTION - ORIENTATION: ORIENTATION: LEFT

## 2025-04-15 ASSESSMENT — PAIN SCALES - GENERAL: PAINLEVEL_OUTOF10: 6

## 2025-04-15 ASSESSMENT — PAIN DESCRIPTION - LOCATION: LOCATION: KNEE

## 2025-04-15 NOTE — DISCHARGE INSTRUCTIONS
DAY OF SURGERY/PROCEDURE  GUIDELINES    As a patient at the [facility], you can expect quality medical and nursing care that is centered on your individual needs. It is our goal to make your surgical experience as comfortable and excellent as possible.  ________________________________________________________________________    The following instructions are general guidelines, if any information on this sheet is different from what your doctor has instructed you to do, please follow your doctor's instructions.    Please arrive on 5/2/2025 @ 1000      Enter through entrance C. Check in at registration     Upon arrival you will be taken to the pre-operative area to get ready for surgery, your family will stay in the waiting room and visit with you once you are ready for surgery. Due to special limitations please limit visitation to 1-2 members of your family at a time. When it is time for surgery your family will return to the waiting room.    Nothing to eat, drink, smoke, suck or chew after midnight (no water, gum, mints, cigarettes, cigars, pipes, snuff, chewing tobacco, etc.) or your surgery may be canceled.     Take a shower or bath on the morning of your surgery/procedure (Hibiclens if directed) Do not apply any lotions.    Brush your teeth, but do not swallow any water    IN CASE OF ILLNESS - If you have a cold or flu symptoms (high fever, runny nose, sore throat, cough, etc.) rash, nausea, vomiting, loose stools, and/or recent contact with someone who has a contagious disease (chick pox, measles, etc.) please call your doctor before coming to the surgery center    Take a small sip of water with OMEPRAZOLE    If applicable bring your:  Inhaler (s)  Hearing aid(s)  Eyeglasses and Case (If you wear contacts they have to be removed before surgery, bring case and solution)  CPAP     DO NOT take anticoagulants (blood thinners, aspirin or aspirin-containing products) as instructed by your

## 2025-04-16 ENCOUNTER — PATIENT MESSAGE (OUTPATIENT)
Dept: FAMILY MEDICINE CLINIC | Age: 55
End: 2025-04-16

## 2025-04-16 DIAGNOSIS — R30.0 DYSURIA: Primary | ICD-10-CM

## 2025-04-17 ENCOUNTER — E-VISIT (OUTPATIENT)
Dept: FAMILY MEDICINE CLINIC | Age: 55
End: 2025-04-17
Payer: COMMERCIAL

## 2025-04-17 DIAGNOSIS — N39.0 URINARY TRACT INFECTION WITHOUT HEMATURIA, SITE UNSPECIFIED: Primary | ICD-10-CM

## 2025-04-17 PROCEDURE — 99422 OL DIG E/M SVC 11-20 MIN: CPT | Performed by: NURSE PRACTITIONER

## 2025-04-17 RX ORDER — CIPROFLOXACIN 500 MG/1
500 TABLET, FILM COATED ORAL 2 TIMES DAILY
Qty: 20 TABLET | Refills: 0 | Status: SHIPPED | OUTPATIENT
Start: 2025-04-17 | End: 2025-04-27

## 2025-04-17 NOTE — PROGRESS NOTES
VALERIE Rodriguez-CNP  [facility]  [facility]  [address], [address]  [facility] [place] [facility] [address]  Dept: [phone]  Dept Fax: [phone]    ShiraPanguitch E-Visit:    PATIENT ID: Jameson Charles is a 54 y.o. male.    HPI: As per patient provided history    SUBJECTIVE: See E-visit Questionnaire     Diagnosis Orders   1. Urinary tract infection without hematuria, site unspecified  ciprofloxacin (CIPRO) 500 MG tablet         PLAN:  1. Urinary tract infection without hematuria, site unspecified  - Previous urine cultures reviewed    - Denies fever, chills, flank pain or hematuria  - Increase water/cranberry juice intake; Monitor self closely for fever, chills; Avoid baths, proper wiping discussed, white cotton panties encouraged, do not hold urine at any time   - Will send Cipro BID x 10 days  - May use Pyridium OTC PRN.   - Call or return to clinic prn if these symptoms worsen or fail to improve as anticipated.     11-20 minutes were spent on the digital evaluation and management of this patient.     Electronically signed by VALERIE Chester NP on 4/17/2025 at 8:39 AM

## 2025-05-15 RX ORDER — SITAGLIPTIN 50 MG/1
1 TABLET ORAL
COMMUNITY

## 2025-05-16 ENCOUNTER — HOSPITAL ENCOUNTER (OUTPATIENT)
Age: 55
Setting detail: OUTPATIENT SURGERY
Discharge: HOME | End: 2025-05-16
Attending: STUDENT IN AN ORGANIZED HEALTH CARE EDUCATION/TRAINING PROGRAM | Admitting: STUDENT IN AN ORGANIZED HEALTH CARE EDUCATION/TRAINING PROGRAM
Payer: COMMERCIAL

## 2025-05-16 ENCOUNTER — ANESTHESIA EVENT (OUTPATIENT)
Dept: OPERATING ROOM | Age: 55
End: 2025-05-16
Payer: COMMERCIAL

## 2025-05-16 ENCOUNTER — ANESTHESIA (OUTPATIENT)
Dept: OPERATING ROOM | Age: 55
End: 2025-05-16
Payer: COMMERCIAL

## 2025-05-16 VITALS
TEMPERATURE: 98.6 F | HEART RATE: 71 BPM | HEIGHT: 67 IN | BODY MASS INDEX: 49.44 KG/M2 | DIASTOLIC BLOOD PRESSURE: 69 MMHG | SYSTOLIC BLOOD PRESSURE: 138 MMHG | RESPIRATION RATE: 13 BRPM | WEIGHT: 315 LBS | OXYGEN SATURATION: 96 %

## 2025-05-16 DIAGNOSIS — K42.9 UMBILICAL HERNIA WITHOUT OBSTRUCTION AND WITHOUT GANGRENE: Primary | ICD-10-CM

## 2025-05-16 LAB
BUN BLD-MCNC: 24 MG/DL (ref 8–26)
EGFR, POC: 72 ML/MIN/1.73M2
GLUCOSE BLD-MCNC: 141 MG/DL (ref 74–100)
POC CREATININE: 1.2 MG/DL (ref 0.51–1.19)
POTASSIUM BLD-SCNC: 4.2 MMOL/L (ref 3.5–4.5)

## 2025-05-16 PROCEDURE — 7100000000 HC PACU RECOVERY - FIRST 15 MIN: Performed by: STUDENT IN AN ORGANIZED HEALTH CARE EDUCATION/TRAINING PROGRAM

## 2025-05-16 PROCEDURE — 2580000003 HC RX 258: Performed by: NURSE ANESTHETIST, CERTIFIED REGISTERED

## 2025-05-16 PROCEDURE — 84520 ASSAY OF UREA NITROGEN: CPT

## 2025-05-16 PROCEDURE — 2500000003 HC RX 250 WO HCPCS: Performed by: STUDENT IN AN ORGANIZED HEALTH CARE EDUCATION/TRAINING PROGRAM

## 2025-05-16 PROCEDURE — 6360000002 HC RX W HCPCS: Performed by: NURSE ANESTHETIST, CERTIFIED REGISTERED

## 2025-05-16 PROCEDURE — 6360000002 HC RX W HCPCS: Performed by: ANESTHESIOLOGY

## 2025-05-16 PROCEDURE — 7100000010 HC PHASE II RECOVERY - FIRST 15 MIN: Performed by: STUDENT IN AN ORGANIZED HEALTH CARE EDUCATION/TRAINING PROGRAM

## 2025-05-16 PROCEDURE — 3600000019 HC SURGERY ROBOT ADDTL 15MIN: Performed by: STUDENT IN AN ORGANIZED HEALTH CARE EDUCATION/TRAINING PROGRAM

## 2025-05-16 PROCEDURE — 82565 ASSAY OF CREATININE: CPT

## 2025-05-16 PROCEDURE — S2900 ROBOTIC SURGICAL SYSTEM: HCPCS | Performed by: STUDENT IN AN ORGANIZED HEALTH CARE EDUCATION/TRAINING PROGRAM

## 2025-05-16 PROCEDURE — 3600000009 HC SURGERY ROBOT BASE: Performed by: STUDENT IN AN ORGANIZED HEALTH CARE EDUCATION/TRAINING PROGRAM

## 2025-05-16 PROCEDURE — C1781 MESH (IMPLANTABLE): HCPCS | Performed by: STUDENT IN AN ORGANIZED HEALTH CARE EDUCATION/TRAINING PROGRAM

## 2025-05-16 PROCEDURE — 84132 ASSAY OF SERUM POTASSIUM: CPT

## 2025-05-16 PROCEDURE — 82947 ASSAY GLUCOSE BLOOD QUANT: CPT

## 2025-05-16 PROCEDURE — 93005 ELECTROCARDIOGRAM TRACING: CPT | Performed by: STUDENT IN AN ORGANIZED HEALTH CARE EDUCATION/TRAINING PROGRAM

## 2025-05-16 PROCEDURE — 2709999900 HC NON-CHARGEABLE SUPPLY: Performed by: STUDENT IN AN ORGANIZED HEALTH CARE EDUCATION/TRAINING PROGRAM

## 2025-05-16 PROCEDURE — 3700000001 HC ADD 15 MINUTES (ANESTHESIA): Performed by: STUDENT IN AN ORGANIZED HEALTH CARE EDUCATION/TRAINING PROGRAM

## 2025-05-16 PROCEDURE — 7100000001 HC PACU RECOVERY - ADDTL 15 MIN: Performed by: STUDENT IN AN ORGANIZED HEALTH CARE EDUCATION/TRAINING PROGRAM

## 2025-05-16 PROCEDURE — 6370000000 HC RX 637 (ALT 250 FOR IP): Performed by: ANESTHESIOLOGY

## 2025-05-16 PROCEDURE — 2500000003 HC RX 250 WO HCPCS: Performed by: NURSE ANESTHETIST, CERTIFIED REGISTERED

## 2025-05-16 PROCEDURE — 6360000002 HC RX W HCPCS: Performed by: STUDENT IN AN ORGANIZED HEALTH CARE EDUCATION/TRAINING PROGRAM

## 2025-05-16 PROCEDURE — 7100000011 HC PHASE II RECOVERY - ADDTL 15 MIN: Performed by: STUDENT IN AN ORGANIZED HEALTH CARE EDUCATION/TRAINING PROGRAM

## 2025-05-16 PROCEDURE — 3700000000 HC ANESTHESIA ATTENDED CARE: Performed by: STUDENT IN AN ORGANIZED HEALTH CARE EDUCATION/TRAINING PROGRAM

## 2025-05-16 DEVICE — VENTRALIGHT ST MESH WITH ECHO 2 POSITIONING SYSTEM, 6" (15 CM), CIRCLE
Type: IMPLANTABLE DEVICE | Site: ABDOMEN | Status: FUNCTIONAL
Brand: VENTRALIGHT

## 2025-05-16 RX ORDER — ONDANSETRON 4 MG/1
4 TABLET, FILM COATED ORAL 3 TIMES DAILY PRN
Qty: 15 TABLET | Refills: 0 | Status: SHIPPED | OUTPATIENT
Start: 2025-05-16

## 2025-05-16 RX ORDER — MAGNESIUM HYDROXIDE 1200 MG/15ML
LIQUID ORAL CONTINUOUS PRN
Status: DISCONTINUED | OUTPATIENT
Start: 2025-05-16 | End: 2025-05-16 | Stop reason: HOSPADM

## 2025-05-16 RX ORDER — DOCUSATE SODIUM 100 MG/1
100 CAPSULE, LIQUID FILLED ORAL 2 TIMES DAILY
Qty: 28 CAPSULE | Refills: 0 | Status: SHIPPED | OUTPATIENT
Start: 2025-05-16 | End: 2025-05-30

## 2025-05-16 RX ORDER — OXYCODONE AND ACETAMINOPHEN 5; 325 MG/1; MG/1
1 TABLET ORAL ONCE
Status: COMPLETED | OUTPATIENT
Start: 2025-05-16 | End: 2025-05-16

## 2025-05-16 RX ORDER — ONDANSETRON 2 MG/ML
INJECTION INTRAMUSCULAR; INTRAVENOUS
Status: DISCONTINUED | OUTPATIENT
Start: 2025-05-16 | End: 2025-05-16 | Stop reason: SDUPTHER

## 2025-05-16 RX ORDER — BUPIVACAINE HYDROCHLORIDE AND EPINEPHRINE 5; 5 MG/ML; UG/ML
INJECTION, SOLUTION EPIDURAL; INTRACAUDAL; PERINEURAL PRN
Status: DISCONTINUED | OUTPATIENT
Start: 2025-05-16 | End: 2025-05-16 | Stop reason: ALTCHOICE

## 2025-05-16 RX ORDER — SODIUM CHLORIDE 9 MG/ML
INJECTION, SOLUTION INTRAVENOUS PRN
Status: DISCONTINUED | OUTPATIENT
Start: 2025-05-16 | End: 2025-05-16 | Stop reason: HOSPADM

## 2025-05-16 RX ORDER — MIDAZOLAM HYDROCHLORIDE 1 MG/ML
INJECTION, SOLUTION INTRAMUSCULAR; INTRAVENOUS
Status: DISCONTINUED | OUTPATIENT
Start: 2025-05-16 | End: 2025-05-16 | Stop reason: SDUPTHER

## 2025-05-16 RX ORDER — HYDRALAZINE HYDROCHLORIDE 20 MG/ML
10 INJECTION INTRAMUSCULAR; INTRAVENOUS
Status: DISCONTINUED | OUTPATIENT
Start: 2025-05-16 | End: 2025-05-16 | Stop reason: HOSPADM

## 2025-05-16 RX ORDER — DIPHENHYDRAMINE HYDROCHLORIDE 50 MG/ML
12.5 INJECTION, SOLUTION INTRAMUSCULAR; INTRAVENOUS
Status: DISCONTINUED | OUTPATIENT
Start: 2025-05-16 | End: 2025-05-16 | Stop reason: HOSPADM

## 2025-05-16 RX ORDER — PROPOFOL 10 MG/ML
INJECTION, EMULSION INTRAVENOUS
Status: DISCONTINUED | OUTPATIENT
Start: 2025-05-16 | End: 2025-05-16 | Stop reason: SDUPTHER

## 2025-05-16 RX ORDER — CYCLOBENZAPRINE HCL 10 MG
10 TABLET ORAL ONCE
Status: COMPLETED | OUTPATIENT
Start: 2025-05-16 | End: 2025-05-16

## 2025-05-16 RX ORDER — LIDOCAINE HYDROCHLORIDE 10 MG/ML
INJECTION, SOLUTION EPIDURAL; INFILTRATION; INTRACAUDAL; PERINEURAL
Status: DISCONTINUED | OUTPATIENT
Start: 2025-05-16 | End: 2025-05-16 | Stop reason: SDUPTHER

## 2025-05-16 RX ORDER — MEPERIDINE HYDROCHLORIDE 50 MG/ML
12.5 INJECTION INTRAMUSCULAR; INTRAVENOUS; SUBCUTANEOUS EVERY 5 MIN PRN
Status: DISCONTINUED | OUTPATIENT
Start: 2025-05-16 | End: 2025-05-16 | Stop reason: HOSPADM

## 2025-05-16 RX ORDER — FENTANYL CITRATE 50 UG/ML
INJECTION, SOLUTION INTRAMUSCULAR; INTRAVENOUS
Status: DISCONTINUED | OUTPATIENT
Start: 2025-05-16 | End: 2025-05-16 | Stop reason: SDUPTHER

## 2025-05-16 RX ORDER — DEXAMETHASONE SODIUM PHOSPHATE 10 MG/ML
INJECTION, SOLUTION INTRA-ARTICULAR; INTRALESIONAL; INTRAMUSCULAR; INTRAVENOUS; SOFT TISSUE
Status: DISCONTINUED | OUTPATIENT
Start: 2025-05-16 | End: 2025-05-16 | Stop reason: SDUPTHER

## 2025-05-16 RX ORDER — OXYCODONE AND ACETAMINOPHEN 5; 325 MG/1; MG/1
1 TABLET ORAL EVERY 6 HOURS PRN
Qty: 28 TABLET | Refills: 0 | Status: SHIPPED | OUTPATIENT
Start: 2025-05-16 | End: 2025-05-23

## 2025-05-16 RX ORDER — SODIUM CHLORIDE 0.9 % (FLUSH) 0.9 %
5-40 SYRINGE (ML) INJECTION EVERY 12 HOURS SCHEDULED
Status: DISCONTINUED | OUTPATIENT
Start: 2025-05-16 | End: 2025-05-16 | Stop reason: HOSPADM

## 2025-05-16 RX ORDER — METOCLOPRAMIDE HYDROCHLORIDE 5 MG/ML
10 INJECTION INTRAMUSCULAR; INTRAVENOUS
Status: DISCONTINUED | OUTPATIENT
Start: 2025-05-16 | End: 2025-05-16 | Stop reason: HOSPADM

## 2025-05-16 RX ORDER — SODIUM CHLORIDE, SODIUM LACTATE, POTASSIUM CHLORIDE, CALCIUM CHLORIDE 600; 310; 30; 20 MG/100ML; MG/100ML; MG/100ML; MG/100ML
INJECTION, SOLUTION INTRAVENOUS
Status: DISCONTINUED | OUTPATIENT
Start: 2025-05-16 | End: 2025-05-16 | Stop reason: SDUPTHER

## 2025-05-16 RX ORDER — NALOXONE HYDROCHLORIDE 0.4 MG/ML
INJECTION, SOLUTION INTRAMUSCULAR; INTRAVENOUS; SUBCUTANEOUS PRN
Status: DISCONTINUED | OUTPATIENT
Start: 2025-05-16 | End: 2025-05-16 | Stop reason: HOSPADM

## 2025-05-16 RX ORDER — SODIUM CHLORIDE 0.9 % (FLUSH) 0.9 %
5-40 SYRINGE (ML) INJECTION PRN
Status: DISCONTINUED | OUTPATIENT
Start: 2025-05-16 | End: 2025-05-16 | Stop reason: HOSPADM

## 2025-05-16 RX ORDER — CYCLOBENZAPRINE HCL 10 MG
10 TABLET ORAL 3 TIMES DAILY PRN
Qty: 21 TABLET | Refills: 0 | Status: SHIPPED | OUTPATIENT
Start: 2025-05-16 | End: 2025-05-26

## 2025-05-16 RX ORDER — DROPERIDOL 2.5 MG/ML
0.62 INJECTION, SOLUTION INTRAMUSCULAR; INTRAVENOUS
Status: DISCONTINUED | OUTPATIENT
Start: 2025-05-16 | End: 2025-05-16 | Stop reason: HOSPADM

## 2025-05-16 RX ORDER — ROCURONIUM BROMIDE 10 MG/ML
INJECTION, SOLUTION INTRAVENOUS
Status: DISCONTINUED | OUTPATIENT
Start: 2025-05-16 | End: 2025-05-16 | Stop reason: SDUPTHER

## 2025-05-16 RX ADMIN — HYDROMORPHONE HYDROCHLORIDE 0.5 MG: 1 INJECTION, SOLUTION INTRAMUSCULAR; INTRAVENOUS; SUBCUTANEOUS at 13:36

## 2025-05-16 RX ADMIN — LIDOCAINE HYDROCHLORIDE 50 MG: 10 INJECTION, SOLUTION EPIDURAL; INFILTRATION; INTRACAUDAL; PERINEURAL at 11:05

## 2025-05-16 RX ADMIN — FENTANYL CITRATE 50 MCG: 50 INJECTION, SOLUTION INTRAMUSCULAR; INTRAVENOUS at 11:39

## 2025-05-16 RX ADMIN — PROPOFOL 250 MG: 10 INJECTION, EMULSION INTRAVENOUS at 11:05

## 2025-05-16 RX ADMIN — FENTANYL CITRATE 50 MCG: 50 INJECTION, SOLUTION INTRAMUSCULAR; INTRAVENOUS at 12:39

## 2025-05-16 RX ADMIN — CYCLOBENZAPRINE 10 MG: 10 TABLET, FILM COATED ORAL at 14:30

## 2025-05-16 RX ADMIN — FENTANYL CITRATE 50 MCG: 50 INJECTION, SOLUTION INTRAMUSCULAR; INTRAVENOUS at 11:42

## 2025-05-16 RX ADMIN — ROCURONIUM BROMIDE 50 MG: 50 INJECTION INTRAVENOUS at 11:06

## 2025-05-16 RX ADMIN — ROCURONIUM BROMIDE 20 MG: 50 INJECTION INTRAVENOUS at 12:23

## 2025-05-16 RX ADMIN — SODIUM CHLORIDE, POTASSIUM CHLORIDE, SODIUM LACTATE AND CALCIUM CHLORIDE: 600; 310; 30; 20 INJECTION, SOLUTION INTRAVENOUS at 10:58

## 2025-05-16 RX ADMIN — ROCURONIUM BROMIDE 30 MG: 50 INJECTION INTRAVENOUS at 12:50

## 2025-05-16 RX ADMIN — HYDROMORPHONE HYDROCHLORIDE 0.5 MG: 1 INJECTION, SOLUTION INTRAMUSCULAR; INTRAVENOUS; SUBCUTANEOUS at 14:18

## 2025-05-16 RX ADMIN — ONDANSETRON 4 MG: 2 INJECTION, SOLUTION INTRAMUSCULAR; INTRAVENOUS at 13:03

## 2025-05-16 RX ADMIN — ROCURONIUM BROMIDE 20 MG: 50 INJECTION INTRAVENOUS at 12:06

## 2025-05-16 RX ADMIN — HYDROMORPHONE HYDROCHLORIDE 0.5 MG: 1 INJECTION, SOLUTION INTRAMUSCULAR; INTRAVENOUS; SUBCUTANEOUS at 13:42

## 2025-05-16 RX ADMIN — DEXAMETHASONE SODIUM PHOSPHATE 10 MG: 10 INJECTION INTRAMUSCULAR; INTRAVENOUS at 11:14

## 2025-05-16 RX ADMIN — HYDROMORPHONE HYDROCHLORIDE 0.5 MG: 1 INJECTION, SOLUTION INTRAMUSCULAR; INTRAVENOUS; SUBCUTANEOUS at 13:55

## 2025-05-16 RX ADMIN — Medication 3000 MG: at 11:17

## 2025-05-16 RX ADMIN — FENTANYL CITRATE 100 MCG: 50 INJECTION, SOLUTION INTRAMUSCULAR; INTRAVENOUS at 11:00

## 2025-05-16 RX ADMIN — FENTANYL CITRATE 50 MCG: 50 INJECTION, SOLUTION INTRAMUSCULAR; INTRAVENOUS at 12:19

## 2025-05-16 RX ADMIN — FENTANYL CITRATE 100 MCG: 50 INJECTION, SOLUTION INTRAMUSCULAR; INTRAVENOUS at 13:21

## 2025-05-16 RX ADMIN — MIDAZOLAM 2 MG: 1 INJECTION INTRAMUSCULAR; INTRAVENOUS at 11:00

## 2025-05-16 RX ADMIN — OXYCODONE HYDROCHLORIDE AND ACETAMINOPHEN 1 TABLET: 5; 325 TABLET ORAL at 14:55

## 2025-05-16 RX ADMIN — SUGAMMADEX 500 MG: 100 INJECTION, SOLUTION INTRAVENOUS at 13:09

## 2025-05-16 RX ADMIN — ROCURONIUM BROMIDE 30 MG: 50 INJECTION INTRAVENOUS at 11:37

## 2025-05-16 ASSESSMENT — PAIN DESCRIPTION - ORIENTATION
ORIENTATION: MID

## 2025-05-16 ASSESSMENT — PAIN DESCRIPTION - LOCATION
LOCATION: ABDOMEN

## 2025-05-16 ASSESSMENT — PAIN SCALES - GENERAL
PAINLEVEL_OUTOF10: 5
PAINLEVEL_OUTOF10: 5
PAINLEVEL_OUTOF10: 7
PAINLEVEL_OUTOF10: 6
PAINLEVEL_OUTOF10: 7
PAINLEVEL_OUTOF10: 4
PAINLEVEL_OUTOF10: 8
PAINLEVEL_OUTOF10: 5
PAINLEVEL_OUTOF10: 8

## 2025-05-16 ASSESSMENT — PAIN DESCRIPTION - DESCRIPTORS
DESCRIPTORS: SORE;SPASM
DESCRIPTORS: SORE;SHARP
DESCRIPTORS: SORE;SHARP
DESCRIPTORS: SORE;SPASM

## 2025-05-16 NOTE — OP NOTE
Operative Note      Patient: Jameson Charles  YOB: 1970  MRN: [id number]    Date of Procedure: 5/16/2025    Pre-Op Diagnosis Codes:      * Umbilical hernia without obstruction and without gangrene [K42.9]    Post-Op Diagnosis: Same       Procedure(s):  ROBOTIC LAPAROSCOPIC UMBILICAL HERNIA REPAIR, MESH    Surgeon(s):  Giovanni Vick, DO Wesley, Josias Jesus IV,     Assistant:   * No surgical staff found *    Anesthesia: General    Estimated Blood Loss (mL): less than 50     Complications: None    Specimens:   * No specimens in log *    Implants:  Implant Name Type Inv. Item Serial No.  Lot No. LRB No. Used Action   MESH SURG DIA6IN CIR W ECHO 2 POS SYS VENTRALIGHT - [id number]  MESH SURG DIA6IN CIR W ECHO 2 POS SYS VENTRALIGHT  BARD DAVOL-WD [id number] N/A 1 Implanted         Drains: * No LDAs found *    Findings:  Infection Present At Time Of Surgery (JENNIFER) (choose all levels that have infection present):  No infection present  Other Findings: 3 cm hernia defect was containing sigmoid colon    Detailed Description of Procedure:   Indication:  Patient is a 54-year-old gentleman who presented to the surgery clinic with umbilical hernia with incarcerated sigmoid colon.  Hernia causes him pain and also cries because of some constipation.  Patient was offered hernia repair.  Risk, benefits, and alternatives were discussed.  Informed consent was obtained.    Procedure:    At the time of the procedure no qualified resident or first assist was available and due to the difficulty of this case Dr. Wesley graciously assisted during the procedure.    The patient was brought to the operative suite and placed in the supine position.  The anesthesia team initiated general anesthesia with endotracheal intubation.  A dose of antibiotics were given according to SCIP protocol. EPC cuffs were placed for DVT prophylaxis. A Alexsander Hugger was placed to keep the patient euthermic.  The patient's abdomen was

## 2025-05-16 NOTE — ANESTHESIA PRE PROCEDURE
Applicable):  No results found for: \"ABORH\", \"LABANTI\"    Drug/Infectious Status (If Applicable):  No results found for: \"HIV\", \"HEPCAB\"    COVID-19 Screening (If Applicable):   Lab Results   Component Value Date/Time    COVID19 Not Detected 10/01/2021 10:36 PM           Anesthesia Evaluation  Patient summary reviewed and Nursing notes reviewed  Airway: Mallampati: II  TM distance: >3 FB   Neck ROM: full  Mouth opening: > = 3 FB   Dental: normal exam         Pulmonary:normal exam    (+)     sleep apnea:       asthma:                            Cardiovascular:    (+) hypertension:        Rhythm: regular  Rate: normal                    Neuro/Psych:   (+) neuromuscular disease (diabetic neuropathy):, headaches:, psychiatric history:            GI/Hepatic/Renal:   (+) GERD:, liver disease:          Endo/Other:    (+) DiabetesType II DM, blood dyscrasia: anemia:..                 Abdominal:             Vascular:          Other Findings:       Anesthesia Plan      general     ASA 3       Induction: intravenous.    MIPS: Postoperative opioids intended and Prophylactic antiemetics administered.  Anesthetic plan and risks discussed with patient.    Use of blood products discussed with patient whom consented to blood products.    Plan discussed with CRNA.                Jaspreet Ruiz MD   5/16/2025

## 2025-05-16 NOTE — H&P
SURGERY H&P    DATE: May 16, 2025     SUBJECTIVE:  INDIRA BAER is a 54 y.o. male who presents for repair of umbilical hernia. Has risk stratification complete by cardiology, pulmonology, and primary care. Continues to lose weight.         Past Medical History:   Diagnosis Date    Abdominal pain     Acute pyelonephritis 10/30/2018    Anemia     pt has been worked up for this and no answers yet to what is causing this    Anxiety     Arthritis     osteoarthitis     Asthma     Sees Pulmonologist Dr Brant Ramey    Atrial fibrillation (HCC)     EKG now RSR    Atrophic kidney     left side    Cellulitis of left lower extremity 10/02/2021    left    Chronic back pain 2005    Complicated UTI (urinary tract infection) 10/01/2021    Depression     Difficulty in walking     uses a cane    Essential hypertension 11/21/2016    Frequent UTI     GERD (gastroesophageal reflux disease)     Hyperlipidemia     On statin (9/19/22)    Insomnia     Iron deficiency     and 'low rbc\" per pt . Sees hematologist Dr. Blackmon at [facility]    Irritable bowel syndrome     Kidney stone     in left kidney per pt it is 5-6 cm    Left ventricular enlargement     See ECHO 11/06/2019    Liver disease     fatty liver    Mandible fracture (HCC) 1986    upper mandible surgery    Migraines     MRSA infection     Left knee, states \"rare strain\"- had maybe 15-20 years ago    MVP (mitral valve prolapse)     TCC Dr. Orozco last visit 2025    Neuropathy     hands and left foot     Nonalcoholic hepatosteatosis 08/02/2013    Obesity 2001    morbid    CHELSEA (obstructive sleep apnea)     bipap    Osteoarthritis 2009    Prolonged emergence from general anesthesia     Per pt.    Recurrent nephrolithiasis 10/31/2018    Screening PSA (prostate specific antigen) 07/13/2009    Skipped beats     Patient states skipped beats or extra beats noted in the past    Type 2 diabetes mellitus (HCC)     Checks blood sugar at home, averages 130-150, last A1C 6.2 managed by PCP

## 2025-05-16 NOTE — ANESTHESIA POSTPROCEDURE EVALUATION
Department of Anesthesiology  Postprocedure Note    Patient: Jameson Charles  MRN: [id number]  YOB: 1970  Date of evaluation: 5/16/2025    Procedure Summary       Date: 05/16/25 Room / Location: [address]    Anesthesia Start: 1058 Anesthesia Stop: 1322    Procedure: ROBOTIC LAPAROSCOPIC UMBILICAL HERNIA REPAIR, MESH Diagnosis:       Umbilical hernia without obstruction and without gangrene      (Umbilical hernia without obstruction and without gangrene [K42.9])    Surgeons: Giovanni Vick DO Responsible Provider: Jaspreet Ruiz MD    Anesthesia Type: general ASA Status: 3            Anesthesia Type: No value filed.    David Phase I: David Score: 8    David Phase II:      Anesthesia Post Evaluation    Patient location during evaluation: bedside  Patient participation: complete - patient participated  Level of consciousness: awake and alert  Airway patency: patent  Nausea & Vomiting: no nausea and no vomiting  Cardiovascular status: hemodynamically stable  Respiratory status: acceptable  Hydration status: stable  Comments: /61   Pulse 86   Temp 97.5 °F (36.4 °C) (Temporal)   Resp 25   Ht 1.702 m (5' 7\")   Wt (!) 171.9 kg (379 lb)   SpO2 90%   BMI 59.36 kg/m²     Pain management: adequate    No notable events documented.

## 2025-05-16 NOTE — PROGRESS NOTES
Dr. Vick @ bedside, surgical glue applied @ port side below the umbilicus, made comfortable after.

## 2025-05-16 NOTE — DISCHARGE INSTRUCTIONS
Discharge Instructions:    Diet:   You may resume a regular diet.    Wound Care:   Skin glue was used to cover your incision(s). Please note that this glue is tinted purple; therefore, a slight purple hue around your incisions is normal. The skin glue will fall off on its own in about 10 days. You may shower, but do not scrub the incision sites directly or soak (tub, pool, etc.).    Medications:  OK to resume aspirin and Xarelto 24 hours after the procedure    Activity:   No heavy lifting greater than ~10lbs for 6 weeks.    Pain management:   Unless informed of any restrictions by your primary care physician, please use your preferred over-the-counter pain reliever as your primary pain medication. If you have pain that persists despite over-the-counter pain medications, you have been provided with a prescription for an opioid/narcotic pain reliever.   Be aware that this medication is a combination of opioid/narcotic and acetaminophen (the main ingredient in Tylenol); therefore, it will contribute to your daily limit of 4,000mg acetaminophen.     No driving or operating machinery while taking opioid/narcotic medications.     If you are not taking the opioid pain medication, then you can drive when you feel as though you can sit comfortably behind the steering wheel and can slam on the brake or turn the wheel sharply without it hurting. We recommend that you practice this while sitting the car with it parked in your driveway before trying to drive on the road.    Bowel Regimen:   Opioid/Narcotic pain relievers have a common side effect of constipation; therefore, you have been provided with a prescription for a stool softener, Docusate (Colace). Please note that this medication is available over-the-counter at the same dose as that available with a prescription.       This medication is  intended to help prevent you from experiencing this very common side effect and also help to regulate your bowels after surgery.   Accordingly, if you do not experience constipation, then you do not need to take this medication.     Bowel Regimen Instructions:  Take one Colace pill two times each day while you are taking the narcotic pain reliever. If your stools become too loose and/or frequent, decrease the Colace to one pill one time each day. If your stools are still loose after this modification, stop taking this medication all together. This medicine was prescribed with the intention of being an as-needed medicine; accordingly, you do not have to finish the prescription or use at all if you find that you do not need it.    Reasons to Return:   Some soreness and redness is common after surgery, especially for the first 24-48 hours. If, however, you experience for increasing redness, worsening pain, new and/or increasing drainage from wound, fever above 101.5 degrees Farenheit, bleeding that does not stop soon after discovery, or any other concerns about your incision or post op course, please either call the office or call/return to the Emergency Department for further evaluation.    Follow up with Dr. Vick in 2 weeks.

## 2025-05-17 LAB
EKG ATRIAL RATE: 59 BPM
EKG P AXIS: 12 DEGREES
EKG P-R INTERVAL: 204 MS
EKG Q-T INTERVAL: 434 MS
EKG QRS DURATION: 100 MS
EKG QTC CALCULATION (BAZETT): 429 MS
EKG R AXIS: -33 DEGREES
EKG T AXIS: 17 DEGREES
EKG VENTRICULAR RATE: 59 BPM

## 2025-05-19 LAB — GLUCOSE BLD-MCNC: 160 MG/DL (ref 75–110)

## 2025-06-20 ENCOUNTER — TELEPHONE (OUTPATIENT)
Dept: FAMILY MEDICINE CLINIC | Age: 55
End: 2025-06-20

## 2025-06-20 DIAGNOSIS — R94.2 DECREASED FUNCTIONAL RESIDUAL CAPACITY: Primary | ICD-10-CM

## 2025-06-20 NOTE — TELEPHONE ENCOUNTER
Patient called in wanting to schedule an appointment to be seen by PCP regarding ADA paperwork he would like PCP to fill out.    Patient states that he has been to occupational health for his knee/hip pain.  Patient did clarify that he has not seen Orthopaedic in quite some time. Patient is wanting PCP to fill out ADA disability paperwork since his work is requiring him to do so.  Patient's employer is stating that his PCP would be the one to fill this out.  Please advise.    Patient is going to try and upload ADA paperwork through [employer] so that PCP could take a look at them.

## 2025-06-23 ENCOUNTER — PATIENT MESSAGE (OUTPATIENT)
Dept: FAMILY MEDICINE CLINIC | Age: 55
End: 2025-06-23

## 2025-06-23 NOTE — TELEPHONE ENCOUNTER
He would need a functional capacity test. That is through physical therapy. Check with him to see where he would prefer to go and I will place the referral.

## 2025-06-23 NOTE — TELEPHONE ENCOUNTER
Pt called back in regards to PT referral. Pt stated he does not have a preference on who the referral is placed for.

## 2025-07-15 ENCOUNTER — HOSPITAL ENCOUNTER (OUTPATIENT)
Dept: PHYSICAL THERAPY | Facility: CLINIC | Age: 55
Setting detail: THERAPIES SERIES
Discharge: HOME | End: 2025-07-15
Payer: COMMERCIAL

## 2025-07-15 PROCEDURE — 97750 PHYSICAL PERFORMANCE TEST: CPT

## 2025-07-22 ENCOUNTER — HOSPITAL ENCOUNTER (OUTPATIENT)
Dept: PHYSICAL THERAPY | Facility: CLINIC | Age: 55
Setting detail: THERAPIES SERIES
End: 2025-07-22
Payer: COMMERCIAL

## 2025-07-28 ENCOUNTER — E-VISIT (OUTPATIENT)
Dept: FAMILY MEDICINE CLINIC | Age: 55
End: 2025-07-28
Payer: COMMERCIAL

## 2025-07-28 DIAGNOSIS — N39.0 URINARY TRACT INFECTION WITHOUT HEMATURIA, SITE UNSPECIFIED: Primary | ICD-10-CM

## 2025-07-28 PROCEDURE — 99422 OL DIG E/M SVC 11-20 MIN: CPT | Performed by: NURSE PRACTITIONER

## 2025-07-28 RX ORDER — CIPROFLOXACIN 500 MG/1
500 TABLET, FILM COATED ORAL 2 TIMES DAILY
Qty: 20 TABLET | Refills: 0 | Status: SHIPPED | OUTPATIENT
Start: 2025-07-28 | End: 2025-07-28

## 2025-07-28 RX ORDER — CIPROFLOXACIN 500 MG/1
500 TABLET, FILM COATED ORAL 2 TIMES DAILY
Qty: 20 TABLET | Refills: 0 | Status: SHIPPED | OUTPATIENT
Start: 2025-07-28 | End: 2025-08-07

## 2025-07-28 NOTE — PROGRESS NOTES
VALERIE Rodriguez-CNP  [facility]  [facility]  [address], [address]  [facility] [address]  Dept: [phone]  Dept Fax: [phone]    [place]-[facility]:    Patient ID: Jameson Charles is a 54 y.o. male.    HPI: As per patient provided history    Subjective: See E-visit Questionnaire     Diagnosis Orders   1. Urinary tract infection without hematuria, site unspecified  ciprofloxacin (CIPRO) 500 MG tablet         PLAN:  1. Urinary tract infection without hematuria, site unspecified  - Denies fever, chills, flank pain or hematuria  - Increase water/cranberry juice intake; Monitor self closely for fever, chills; Avoid baths, proper wiping discussed, white cotton panties encouraged, do not hold urine at any time   - May use Pyridium OTC PRN.   - Cipro BID x 10 days  - Call or return to clinic prn if these symptoms worsen or fail to improve as anticipated.     11-20 minutes were spent on the digital evaluation and management of this patient.     Electronically signed by VALERIE Chester NP on 7/28/2025 at 1:09 PM

## 2025-08-08 ENCOUNTER — PATIENT MESSAGE (OUTPATIENT)
Dept: FAMILY MEDICINE CLINIC | Age: 55
End: 2025-08-08

## (undated) DEVICE — CATHETER IV 14GA L1.25IN TEF STR HUB INTROCAN SFTY

## (undated) DEVICE — SOLUTION IRRIG 3000ML 0.9% SOD CHL USP UROMATIC PLAS CONT

## (undated) DEVICE — CATHETER,URETHRAL,REDRUBBER,STRL,18FR: Brand: MEDLINE

## (undated) DEVICE — NEEDLE HYPO 25GA L1.5IN BLU POLYPR HUB S STL REG BVL STR

## (undated) DEVICE — SUTURE VCRL SZ 2-0 L27IN ABSRB UD L26MM SH 1/2 CIR J417H

## (undated) DEVICE — SUTURE PERMAHAND SZ 2-0 L30IN NONABSORBABLE BLK SH L26MM C016D

## (undated) DEVICE — GARMENT,MEDLINE,DVT,INT,CALF,MED, GEN2: Brand: MEDLINE

## (undated) DEVICE — SEAL

## (undated) DEVICE — SUTURE VCRL SZ 4-0 L27IN ABSRB UD L17MM RB-1 1/2 CIR J214H

## (undated) DEVICE — TUBING, SUCTION, 1/4" X 12', STRAIGHT: Brand: MEDLINE

## (undated) DEVICE — SKIN PREP TRAY W/CHG: Brand: MEDLINE INDUSTRIES, INC.

## (undated) DEVICE — SUTURE PROL SZ 2-0 L30IN NONABSORBABLE BLU L26MM SH 1/2 CIR 8833H

## (undated) DEVICE — STERILE SURGICAL LUBRICANT, METAL TUBE: Brand: SURGILUBE

## (undated) DEVICE — TOWEL,OR,DSP,ST,BLUE,DLX,XR,4/PK,20PK/CS: Brand: MEDLINE

## (undated) DEVICE — SWABSTICK MEDICATED 10% POVIDONE IOD PVP SGL ANTISEP SAT

## (undated) DEVICE — SUTURE PERMA-HAND SZ 2-0 L30IN NONABSORBABLE BLK L26MM SH K833H

## (undated) DEVICE — SYRINGE MED 10ML SLIP TIP BLNT FILL AND LUERLOCK DISP

## (undated) DEVICE — GLOVE ORANGE PI 7 1/2   MSG9075

## (undated) DEVICE — SYSTEM POS SZ 36 X 20 X 1 IN INTEGR ADJ ARM PROTECTOR LIFT

## (undated) DEVICE — GOWN,AURORA,NONREINFORCED,LARGE: Brand: MEDLINE

## (undated) DEVICE — SUTURE CHROMIC GUT SZ 0 L27IN ABSRB BRN SH L26MM 1/2 CIR G124H

## (undated) DEVICE — ULTRACLEAN ACCESSORY ELECTRODE, 1 INCH COATED NEEDLE WITH EXTENDED INSULATION: Brand: ULTRACLEAN

## (undated) DEVICE — Device

## (undated) DEVICE — ELECTRODE PT RET AD L9FT HI MOIST COND ADH HYDRGEL CORDED

## (undated) DEVICE — YANKAUER,FLEXIBLE HANDLE,REGLR CAPACITY: Brand: MEDLINE INDUSTRIES, INC.

## (undated) DEVICE — GLOVE ORTHO 8   MSG9480

## (undated) DEVICE — CONTAINER,SPECIMEN,OR STERILE,4OZ: Brand: MEDLINE

## (undated) DEVICE — SYRINGE, LUER LOCK, 10ML: Brand: MEDLINE

## (undated) DEVICE — BITEBLOCK 54FR W/ DENT RIM BLOX

## (undated) DEVICE — GLOVE SURG 7.5 11.7IN BEAD CUF LIGHT BRN SENSICARE LTX FREE

## (undated) DEVICE — FORCEPS BX L240CM WRK CHN 2.8MM STD CAP W/ NDL MIC MESH

## (undated) DEVICE — BLANKET WRM W29.9XL79.1IN UP BODY FORC AIR MISTRAL-AIR

## (undated) DEVICE — DRAPE,REIN 53X77,STERILE: Brand: MEDLINE

## (undated) DEVICE — ADHESIVE SKIN CLOSURE TOP 36 CC HI VISC DERMBND MINI

## (undated) DEVICE — GARMENT,MEDLINE,DVT,INT,CALF,LG, GEN2: Brand: MEDLINE

## (undated) DEVICE — Z INACTIVE USE 2855094 SPONGE SURG W3 8IN WHT COT RND PNUT DISECT W COUNT CRD RADPQ

## (undated) DEVICE — ADAPTER URO SCP UROLOK LL

## (undated) DEVICE — SURGICAL PROCEDURE KIT BASIN MAJ SET UP

## (undated) DEVICE — STRAP,CATHETER,ELASTIC,HOOK&LOOP: Brand: MEDLINE

## (undated) DEVICE — DRAINBAG,ANTI-REFLUX TOWER,L/F,2000ML,LL: Brand: MEDLINE

## (undated) DEVICE — INSUFFLATION NEEDLE TO ESTABLISH PNEUMOPERITONEUM.: Brand: INSUFFLATION NEEDLE

## (undated) DEVICE — ARM DRAPE

## (undated) DEVICE — SPONGE,PEANUT,XRAY,ST,SM,3/8",5/CARD: Brand: MEDLINE INDUSTRIES, INC.

## (undated) DEVICE — SUTURE MONOCRYL + SZ 4-0 L18IN ABSRB UD L19MM PS-2 3/8 CIR MCP496G

## (undated) DEVICE — KIT CLN UP LIN W/ STD SAHARA TBL SHT 40X60IN DRAW/LIFT SHT

## (undated) DEVICE — INTENDED FOR TISSUE SEPARATION, AND OTHER PROCEDURES THAT REQUIRE A SHARP SURGICAL BLADE TO PUNCTURE OR CUT.: Brand: BARD-PARKER ® CARBON RIB-BACK BLADES

## (undated) DEVICE — BLADE,CARBON-STEEL,15,STRL,DISPOSABLE,TB: Brand: MEDLINE

## (undated) DEVICE — KIT DETRGNT ENZYMATIC SOLUTION 100 ML SOAK SHLD 5 VI LG HUMD

## (undated) DEVICE — SUTURE PROL SZ 3-0 L30IN NONABSORBABLE BLU L26MM SH 1/2 CIR 8832H

## (undated) DEVICE — GUIDEWIRE URO L150CM DIA0.035IN STIFF NIT HYDRPHLC STR TIP

## (undated) DEVICE — SUTURE 0 STRATAFIX SYMMETRIC PDS + 23CM CT-2 SXPP1A446

## (undated) DEVICE — GLOVE ORANGE PI 8   MSG9080

## (undated) DEVICE — APPLICATOR MEDICATED 26 CC SOLUTION HI LT ORNG CHLORAPREP

## (undated) DEVICE — CORD,CAUTERY,BIPOLAR,STERILE: Brand: MEDLINE

## (undated) DEVICE — TIP COVER ACCESSORY

## (undated) DEVICE — STRATAFIX SPRL PDS + 2-0 23CM CT-2

## (undated) DEVICE — ST CHARLES CYSTO PACK: Brand: MEDLINE INDUSTRIES, INC.

## (undated) DEVICE — CATHETER,URETHRAL,REDRUBBER,STRL,16FR: Brand: MEDLINE

## (undated) DEVICE — ENDO KIT W/SYRINGE: Brand: MEDLINE INDUSTRIES, INC.

## (undated) DEVICE — LIQUIBAND RAPID ADHESIVE 36/CS 0.8ML: Brand: MEDLINE

## (undated) DEVICE — TOWEL,OR,DSP,ST,BLUE,STD,4/PK,20PK/CS: Brand: MEDLINE

## (undated) DEVICE — BLADELESS OBTURATOR: Brand: WECK VISTA

## (undated) DEVICE — DEFENDO AIR WATER SUCTION AND BIOPSY VALVE KIT FOR  OLYMPUS: Brand: DEFENDO AIR/WATER/SUCTION AND BIOPSY VALVE

## (undated) DEVICE — MARKER,SKIN,WI/RULER AND LABELS: Brand: MEDLINE

## (undated) DEVICE — SUTURE MONOCRYL SZ 4-0 L18IN ABSRB UD L16MM PC-3 3/8 CIR PRIM Y845G

## (undated) DEVICE — SOLUTION IRRIG 1000ML STRL H2O USP PLAS POUR BTL

## (undated) DEVICE — 3M™ IOBAN™ 2 ANTIMICROBIAL INCISE DRAPE 6650EZ: Brand: IOBAN™ 2

## (undated) DEVICE — TOTAL TRAY, DB, 100% SILI FOLEY, 16FR 10: Brand: MEDLINE

## (undated) DEVICE — S-CURVE URETHRAL DILATOR SET WITH AQ, HYDROPHILIC COATING: Brand: S~CURVE

## (undated) DEVICE — SUTURE VICRYL + SZ 0 L27IN ABSRB VLT L26MM UR-6 5/8 CIR VCP603H

## (undated) DEVICE — STAZ CYSTO: Brand: MEDLINE INDUSTRIES, INC.